# Patient Record
Sex: MALE | Race: OTHER | NOT HISPANIC OR LATINO | URBAN - METROPOLITAN AREA
[De-identification: names, ages, dates, MRNs, and addresses within clinical notes are randomized per-mention and may not be internally consistent; named-entity substitution may affect disease eponyms.]

---

## 2020-04-21 ENCOUNTER — INPATIENT (INPATIENT)
Facility: HOSPITAL | Age: 75
LOS: 19 days | Discharge: HOME HEALTH | DRG: 177 | End: 2020-05-11
Attending: INTERNAL MEDICINE | Admitting: INTERNAL MEDICINE
Payer: MEDICARE

## 2020-04-21 VITALS
WEIGHT: 190.04 LBS | DIASTOLIC BLOOD PRESSURE: 70 MMHG | HEIGHT: 72 IN | RESPIRATION RATE: 18 BRPM | OXYGEN SATURATION: 96 % | HEART RATE: 63 BPM | SYSTOLIC BLOOD PRESSURE: 106 MMHG | TEMPERATURE: 98 F

## 2020-04-21 DIAGNOSIS — R63.8 OTHER SYMPTOMS AND SIGNS CONCERNING FOOD AND FLUID INTAKE: ICD-10-CM

## 2020-04-21 DIAGNOSIS — Z29.9 ENCOUNTER FOR PROPHYLACTIC MEASURES, UNSPECIFIED: ICD-10-CM

## 2020-04-21 DIAGNOSIS — B34.2 CORONAVIRUS INFECTION, UNSPECIFIED: ICD-10-CM

## 2020-04-21 DIAGNOSIS — E11.9 TYPE 2 DIABETES MELLITUS WITHOUT COMPLICATIONS: ICD-10-CM

## 2020-04-21 DIAGNOSIS — I10 ESSENTIAL (PRIMARY) HYPERTENSION: ICD-10-CM

## 2020-04-21 DIAGNOSIS — N17.9 ACUTE KIDNEY FAILURE, UNSPECIFIED: ICD-10-CM

## 2020-04-21 DIAGNOSIS — E03.9 HYPOTHYROIDISM, UNSPECIFIED: ICD-10-CM

## 2020-04-21 DIAGNOSIS — G93.41 METABOLIC ENCEPHALOPATHY: ICD-10-CM

## 2020-04-21 DIAGNOSIS — E87.1 HYPO-OSMOLALITY AND HYPONATREMIA: ICD-10-CM

## 2020-04-21 LAB
ALBUMIN SERPL ELPH-MCNC: 2.7 G/DL — LOW (ref 3.4–5)
ALP SERPL-CCNC: 39 U/L — LOW (ref 40–120)
ALT FLD-CCNC: 32 U/L — SIGNIFICANT CHANGE UP (ref 12–42)
ANION GAP SERPL CALC-SCNC: 11 MMOL/L — SIGNIFICANT CHANGE UP (ref 5–17)
ANION GAP SERPL CALC-SCNC: 6 MMOL/L — LOW (ref 9–16)
ANION GAP SERPL CALC-SCNC: 9 MMOL/L — SIGNIFICANT CHANGE UP (ref 5–17)
APPEARANCE UR: CLEAR — SIGNIFICANT CHANGE UP
APTT BLD: 36.9 SEC — HIGH (ref 27.5–36.3)
AST SERPL-CCNC: 39 U/L — HIGH (ref 15–37)
BASOPHILS # BLD AUTO: 0.01 K/UL — SIGNIFICANT CHANGE UP (ref 0–0.2)
BASOPHILS # BLD AUTO: 0.01 K/UL — SIGNIFICANT CHANGE UP (ref 0–0.2)
BASOPHILS NFR BLD AUTO: 0.2 % — SIGNIFICANT CHANGE UP (ref 0–2)
BASOPHILS NFR BLD AUTO: 0.2 % — SIGNIFICANT CHANGE UP (ref 0–2)
BILIRUB SERPL-MCNC: 0.3 MG/DL — SIGNIFICANT CHANGE UP (ref 0.2–1.2)
BILIRUB UR-MCNC: NEGATIVE — SIGNIFICANT CHANGE UP
BUN SERPL-MCNC: 26 MG/DL — HIGH (ref 7–23)
BUN SERPL-MCNC: 28 MG/DL — HIGH (ref 7–23)
BUN SERPL-MCNC: 39 MG/DL — HIGH (ref 7–23)
CALCIUM SERPL-MCNC: 9.1 MG/DL — SIGNIFICANT CHANGE UP (ref 8.4–10.5)
CALCIUM SERPL-MCNC: 9.1 MG/DL — SIGNIFICANT CHANGE UP (ref 8.4–10.5)
CALCIUM SERPL-MCNC: 9.7 MG/DL — SIGNIFICANT CHANGE UP (ref 8.5–10.5)
CHLORIDE SERPL-SCNC: 100 MMOL/L — SIGNIFICANT CHANGE UP (ref 96–108)
CHLORIDE SERPL-SCNC: 103 MMOL/L — SIGNIFICANT CHANGE UP (ref 96–108)
CHLORIDE SERPL-SCNC: 95 MMOL/L — LOW (ref 96–108)
CO2 SERPL-SCNC: 20 MMOL/L — LOW (ref 22–31)
CO2 SERPL-SCNC: 21 MMOL/L — LOW (ref 22–31)
CO2 SERPL-SCNC: 22 MMOL/L — SIGNIFICANT CHANGE UP (ref 22–31)
COLOR SPEC: YELLOW — SIGNIFICANT CHANGE UP
CREAT ?TM UR-MCNC: 81 MG/DL — SIGNIFICANT CHANGE UP
CREAT SERPL-MCNC: 1.57 MG/DL — HIGH (ref 0.5–1.3)
CREAT SERPL-MCNC: 1.64 MG/DL — HIGH (ref 0.5–1.3)
CREAT SERPL-MCNC: 1.91 MG/DL — HIGH (ref 0.5–1.3)
CRP SERPL-MCNC: 11.02 MG/DL — HIGH (ref 0–0.4)
CRP SERPL-MCNC: >12 MG/DL — HIGH (ref 0–0.9)
D DIMER BLD IA.RAPID-MCNC: 161 NG/ML DDU — SIGNIFICANT CHANGE UP
DIFF PNL FLD: NEGATIVE — SIGNIFICANT CHANGE UP
EOSINOPHIL # BLD AUTO: 0.01 K/UL — SIGNIFICANT CHANGE UP (ref 0–0.5)
EOSINOPHIL # BLD AUTO: 0.01 K/UL — SIGNIFICANT CHANGE UP (ref 0–0.5)
EOSINOPHIL NFR BLD AUTO: 0.2 % — SIGNIFICANT CHANGE UP (ref 0–6)
EOSINOPHIL NFR BLD AUTO: 0.2 % — SIGNIFICANT CHANGE UP (ref 0–6)
FERRITIN SERPL-MCNC: 520 NG/ML — HIGH (ref 30–400)
FLU A RESULT: SIGNIFICANT CHANGE UP
FLU A RESULT: SIGNIFICANT CHANGE UP
FLUAV AG NPH QL: SIGNIFICANT CHANGE UP
FLUBV AG NPH QL: SIGNIFICANT CHANGE UP
GLUCOSE BLDC GLUCOMTR-MCNC: 138 MG/DL — HIGH (ref 70–99)
GLUCOSE BLDC GLUCOMTR-MCNC: 182 MG/DL — HIGH (ref 70–99)
GLUCOSE SERPL-MCNC: 134 MG/DL — HIGH (ref 70–99)
GLUCOSE SERPL-MCNC: 148 MG/DL — HIGH (ref 70–99)
GLUCOSE SERPL-MCNC: 219 MG/DL — HIGH (ref 70–99)
GLUCOSE UR QL: NEGATIVE — SIGNIFICANT CHANGE UP
HCT VFR BLD CALC: 41 % — SIGNIFICANT CHANGE UP (ref 39–50)
HCT VFR BLD CALC: 42.5 % — SIGNIFICANT CHANGE UP (ref 39–50)
HGB BLD-MCNC: 13.9 G/DL — SIGNIFICANT CHANGE UP (ref 13–17)
HGB BLD-MCNC: 14 G/DL — SIGNIFICANT CHANGE UP (ref 13–17)
IMM GRANULOCYTES NFR BLD AUTO: 0.4 % — SIGNIFICANT CHANGE UP (ref 0–1.5)
IMM GRANULOCYTES NFR BLD AUTO: 0.4 % — SIGNIFICANT CHANGE UP (ref 0–1.5)
INR BLD: 1.25 — HIGH (ref 0.88–1.16)
KETONES UR-MCNC: NEGATIVE — SIGNIFICANT CHANGE UP
LACTATE SERPL-SCNC: 1 MMOL/L — SIGNIFICANT CHANGE UP (ref 0.4–2)
LEGIONELLA AG UR QL: NEGATIVE — SIGNIFICANT CHANGE UP
LEUKOCYTE ESTERASE UR-ACNC: NEGATIVE — SIGNIFICANT CHANGE UP
LYMPHOCYTES # BLD AUTO: 0.7 K/UL — LOW (ref 1–3.3)
LYMPHOCYTES # BLD AUTO: 0.72 K/UL — LOW (ref 1–3.3)
LYMPHOCYTES # BLD AUTO: 15 % — SIGNIFICANT CHANGE UP (ref 13–44)
LYMPHOCYTES # BLD AUTO: 15.1 % — SIGNIFICANT CHANGE UP (ref 13–44)
MAGNESIUM SERPL-MCNC: 1.6 MG/DL — SIGNIFICANT CHANGE UP (ref 1.6–2.6)
MCHC RBC-ENTMCNC: 31.1 PG — SIGNIFICANT CHANGE UP (ref 27–34)
MCHC RBC-ENTMCNC: 31.6 PG — SIGNIFICANT CHANGE UP (ref 27–34)
MCHC RBC-ENTMCNC: 32.7 GM/DL — SIGNIFICANT CHANGE UP (ref 32–36)
MCHC RBC-ENTMCNC: 34.1 GM/DL — SIGNIFICANT CHANGE UP (ref 32–36)
MCV RBC AUTO: 92.6 FL — SIGNIFICANT CHANGE UP (ref 80–100)
MCV RBC AUTO: 95.1 FL — SIGNIFICANT CHANGE UP (ref 80–100)
MONOCYTES # BLD AUTO: 0.37 K/UL — SIGNIFICANT CHANGE UP (ref 0–0.9)
MONOCYTES # BLD AUTO: 0.38 K/UL — SIGNIFICANT CHANGE UP (ref 0–0.9)
MONOCYTES NFR BLD AUTO: 7.8 % — SIGNIFICANT CHANGE UP (ref 2–14)
MONOCYTES NFR BLD AUTO: 8.1 % — SIGNIFICANT CHANGE UP (ref 2–14)
NEUTROPHILS # BLD AUTO: 3.55 K/UL — SIGNIFICANT CHANGE UP (ref 1.8–7.4)
NEUTROPHILS # BLD AUTO: 3.64 K/UL — SIGNIFICANT CHANGE UP (ref 1.8–7.4)
NEUTROPHILS NFR BLD AUTO: 76.1 % — SIGNIFICANT CHANGE UP (ref 43–77)
NEUTROPHILS NFR BLD AUTO: 76.3 % — SIGNIFICANT CHANGE UP (ref 43–77)
NITRITE UR-MCNC: NEGATIVE — SIGNIFICANT CHANGE UP
NRBC # BLD: 0 /100 WBCS — SIGNIFICANT CHANGE UP (ref 0–0)
NRBC # BLD: 0 /100 WBCS — SIGNIFICANT CHANGE UP (ref 0–0)
OSMOLALITY SERPL: 285 MOSMOL/KG — SIGNIFICANT CHANGE UP (ref 280–301)
OSMOLALITY UR: 458 MOSMOL/KG — SIGNIFICANT CHANGE UP (ref 100–650)
PH UR: 5.5 — SIGNIFICANT CHANGE UP (ref 5–8)
PLATELET # BLD AUTO: 248 K/UL — SIGNIFICANT CHANGE UP (ref 150–400)
PLATELET # BLD AUTO: 268 K/UL — SIGNIFICANT CHANGE UP (ref 150–400)
POTASSIUM SERPL-MCNC: 4.2 MMOL/L — SIGNIFICANT CHANGE UP (ref 3.5–5.3)
POTASSIUM SERPL-MCNC: 4.7 MMOL/L — SIGNIFICANT CHANGE UP (ref 3.5–5.3)
POTASSIUM SERPL-MCNC: 5.1 MMOL/L — SIGNIFICANT CHANGE UP (ref 3.5–5.3)
POTASSIUM SERPL-SCNC: 4.2 MMOL/L — SIGNIFICANT CHANGE UP (ref 3.5–5.3)
POTASSIUM SERPL-SCNC: 4.7 MMOL/L — SIGNIFICANT CHANGE UP (ref 3.5–5.3)
POTASSIUM SERPL-SCNC: 5.1 MMOL/L — SIGNIFICANT CHANGE UP (ref 3.5–5.3)
PROT SERPL-MCNC: 6.9 G/DL — SIGNIFICANT CHANGE UP (ref 6.4–8.2)
PROT UR-MCNC: NEGATIVE MG/DL — SIGNIFICANT CHANGE UP
PROTHROM AB SERPL-ACNC: 13.9 SEC — HIGH (ref 10–12.9)
RBC # BLD: 4.43 M/UL — SIGNIFICANT CHANGE UP (ref 4.2–5.8)
RBC # BLD: 4.47 M/UL — SIGNIFICANT CHANGE UP (ref 4.2–5.8)
RBC # FLD: 15 % — HIGH (ref 10.3–14.5)
RBC # FLD: 15.1 % — HIGH (ref 10.3–14.5)
RSV RESULT: SIGNIFICANT CHANGE UP
RSV RNA RESP QL NAA+PROBE: SIGNIFICANT CHANGE UP
SARS-COV-2 RNA SPEC QL NAA+PROBE: DETECTED
SODIUM SERPL-SCNC: 122 MMOL/L — LOW (ref 132–145)
SODIUM SERPL-SCNC: 131 MMOL/L — LOW (ref 135–145)
SODIUM SERPL-SCNC: 134 MMOL/L — LOW (ref 135–145)
SODIUM UR-SCNC: 60 MMOL/L — SIGNIFICANT CHANGE UP
SP GR SPEC: 1.01 — SIGNIFICANT CHANGE UP (ref 1–1.03)
TROPONIN I SERPL-MCNC: <0.017 NG/ML — LOW (ref 0.02–0.06)
TSH SERPL-MCNC: 4.48 UIU/ML — SIGNIFICANT CHANGE UP (ref 0.35–4.94)
UROBILINOGEN FLD QL: 0.2 E.U./DL — SIGNIFICANT CHANGE UP
UUN UR-MCNC: 635 MG/DL — SIGNIFICANT CHANGE UP
WBC # BLD: 4.67 K/UL — SIGNIFICANT CHANGE UP (ref 3.8–10.5)
WBC # BLD: 4.77 K/UL — SIGNIFICANT CHANGE UP (ref 3.8–10.5)
WBC # FLD AUTO: 4.67 K/UL — SIGNIFICANT CHANGE UP (ref 3.8–10.5)
WBC # FLD AUTO: 4.77 K/UL — SIGNIFICANT CHANGE UP (ref 3.8–10.5)

## 2020-04-21 PROCEDURE — 99223 1ST HOSP IP/OBS HIGH 75: CPT | Mod: CS

## 2020-04-21 PROCEDURE — 71250 CT THORAX DX C-: CPT | Mod: 26

## 2020-04-21 PROCEDURE — 93010 ELECTROCARDIOGRAM REPORT: CPT

## 2020-04-21 PROCEDURE — 99285 EMERGENCY DEPT VISIT HI MDM: CPT | Mod: CS

## 2020-04-21 PROCEDURE — 99223 1ST HOSP IP/OBS HIGH 75: CPT

## 2020-04-21 PROCEDURE — 70450 CT HEAD/BRAIN W/O DYE: CPT | Mod: 26

## 2020-04-21 RX ORDER — FENOFIBRIC ACID 105 MG/1
1 TABLET ORAL
Qty: 0 | Refills: 0 | DISCHARGE

## 2020-04-21 RX ORDER — AZITHROMYCIN 500 MG/1
250 TABLET, FILM COATED ORAL EVERY 24 HOURS
Refills: 0 | Status: COMPLETED | OUTPATIENT
Start: 2020-04-22 | End: 2020-04-25

## 2020-04-21 RX ORDER — LEVOTHYROXINE SODIUM 125 MCG
137 TABLET ORAL DAILY
Refills: 0 | Status: DISCONTINUED | OUTPATIENT
Start: 2020-04-21 | End: 2020-04-28

## 2020-04-21 RX ORDER — ACETAMINOPHEN 500 MG
650 TABLET ORAL EVERY 6 HOURS
Refills: 0 | Status: DISCONTINUED | OUTPATIENT
Start: 2020-04-21 | End: 2020-05-11

## 2020-04-21 RX ORDER — DEXTROSE 50 % IN WATER 50 %
25 SYRINGE (ML) INTRAVENOUS ONCE
Refills: 0 | Status: DISCONTINUED | OUTPATIENT
Start: 2020-04-21 | End: 2020-05-11

## 2020-04-21 RX ORDER — ROSUVASTATIN CALCIUM 5 MG/1
1 TABLET ORAL
Qty: 0 | Refills: 0 | DISCHARGE

## 2020-04-21 RX ORDER — GLUCAGON INJECTION, SOLUTION 0.5 MG/.1ML
1 INJECTION, SOLUTION SUBCUTANEOUS ONCE
Refills: 0 | Status: DISCONTINUED | OUTPATIENT
Start: 2020-04-21 | End: 2020-05-11

## 2020-04-21 RX ORDER — SODIUM CHLORIDE 9 MG/ML
1000 INJECTION, SOLUTION INTRAVENOUS
Refills: 0 | Status: DISCONTINUED | OUTPATIENT
Start: 2020-04-21 | End: 2020-05-11

## 2020-04-21 RX ORDER — INSULIN LISPRO 100/ML
6 VIAL (ML) SUBCUTANEOUS
Qty: 0 | Refills: 0 | DISCHARGE

## 2020-04-21 RX ORDER — DEXTROSE 50 % IN WATER 50 %
12.5 SYRINGE (ML) INTRAVENOUS ONCE
Refills: 0 | Status: DISCONTINUED | OUTPATIENT
Start: 2020-04-21 | End: 2020-05-11

## 2020-04-21 RX ORDER — CEFTRIAXONE 500 MG/1
1000 INJECTION, POWDER, FOR SOLUTION INTRAMUSCULAR; INTRAVENOUS EVERY 24 HOURS
Refills: 0 | Status: DISCONTINUED | OUTPATIENT
Start: 2020-04-22 | End: 2020-04-22

## 2020-04-21 RX ORDER — AZITHROMYCIN 500 MG/1
500 TABLET, FILM COATED ORAL ONCE
Refills: 0 | Status: COMPLETED | OUTPATIENT
Start: 2020-04-21 | End: 2020-04-21

## 2020-04-21 RX ORDER — INSULIN GLARGINE 100 [IU]/ML
30 INJECTION, SOLUTION SUBCUTANEOUS AT BEDTIME
Refills: 0 | Status: DISCONTINUED | OUTPATIENT
Start: 2020-04-21 | End: 2020-04-23

## 2020-04-21 RX ORDER — CHOLECALCIFEROL (VITAMIN D3) 125 MCG
1 CAPSULE ORAL
Qty: 0 | Refills: 0 | DISCHARGE

## 2020-04-21 RX ORDER — LEVOTHYROXINE SODIUM 125 MCG
1 TABLET ORAL
Qty: 0 | Refills: 0 | DISCHARGE

## 2020-04-21 RX ORDER — DEXTROSE 50 % IN WATER 50 %
15 SYRINGE (ML) INTRAVENOUS ONCE
Refills: 0 | Status: DISCONTINUED | OUTPATIENT
Start: 2020-04-21 | End: 2020-05-11

## 2020-04-21 RX ORDER — ASPIRIN/CALCIUM CARB/MAGNESIUM 324 MG
81 TABLET ORAL DAILY
Refills: 0 | Status: DISCONTINUED | OUTPATIENT
Start: 2020-04-21 | End: 2020-04-27

## 2020-04-21 RX ORDER — INSULIN LISPRO 100/ML
5 VIAL (ML) SUBCUTANEOUS
Refills: 0 | Status: DISCONTINUED | OUTPATIENT
Start: 2020-04-21 | End: 2020-04-23

## 2020-04-21 RX ORDER — HYDROXYCHLOROQUINE SULFATE 200 MG
400 TABLET ORAL EVERY 24 HOURS
Refills: 0 | Status: COMPLETED | OUTPATIENT
Start: 2020-04-22 | End: 2020-04-25

## 2020-04-21 RX ORDER — ENOXAPARIN SODIUM 100 MG/ML
40 INJECTION SUBCUTANEOUS EVERY 24 HOURS
Refills: 0 | Status: DISCONTINUED | OUTPATIENT
Start: 2020-04-21 | End: 2020-04-25

## 2020-04-21 RX ORDER — ATORVASTATIN CALCIUM 80 MG/1
20 TABLET, FILM COATED ORAL AT BEDTIME
Refills: 0 | Status: DISCONTINUED | OUTPATIENT
Start: 2020-04-22 | End: 2020-05-11

## 2020-04-21 RX ORDER — HYDROXYCHLOROQUINE SULFATE 200 MG
800 TABLET ORAL EVERY 24 HOURS
Refills: 0 | Status: COMPLETED | OUTPATIENT
Start: 2020-04-21 | End: 2020-04-21

## 2020-04-21 RX ORDER — CEFTRIAXONE 500 MG/1
1000 INJECTION, POWDER, FOR SOLUTION INTRAMUSCULAR; INTRAVENOUS ONCE
Refills: 0 | Status: COMPLETED | OUTPATIENT
Start: 2020-04-21 | End: 2020-04-21

## 2020-04-21 RX ORDER — SODIUM CHLORIDE 9 MG/ML
1000 INJECTION, SOLUTION INTRAVENOUS
Refills: 0 | Status: DISCONTINUED | OUTPATIENT
Start: 2020-04-21 | End: 2020-04-21

## 2020-04-21 RX ORDER — INSULIN GLARGINE 100 [IU]/ML
28 INJECTION, SOLUTION SUBCUTANEOUS
Qty: 0 | Refills: 0 | DISCHARGE

## 2020-04-21 RX ORDER — HYDROXYCHLOROQUINE SULFATE 200 MG
TABLET ORAL
Refills: 0 | Status: COMPLETED | OUTPATIENT
Start: 2020-04-21 | End: 2020-04-25

## 2020-04-21 RX ORDER — QUETIAPINE FUMARATE 200 MG/1
25 TABLET, FILM COATED ORAL ONCE
Refills: 0 | Status: COMPLETED | OUTPATIENT
Start: 2020-04-21 | End: 2020-04-21

## 2020-04-21 RX ORDER — CHOLECALCIFEROL (VITAMIN D3) 125 MCG
1000 CAPSULE ORAL EVERY 24 HOURS
Refills: 0 | Status: DISCONTINUED | OUTPATIENT
Start: 2020-04-21 | End: 2020-05-11

## 2020-04-21 RX ORDER — ATENOLOL 25 MG/1
25 TABLET ORAL DAILY
Refills: 0 | Status: DISCONTINUED | OUTPATIENT
Start: 2020-04-21 | End: 2020-04-23

## 2020-04-21 RX ORDER — ALLOPURINOL 300 MG
1 TABLET ORAL
Qty: 0 | Refills: 0 | DISCHARGE

## 2020-04-21 RX ORDER — SODIUM CHLORIDE 9 MG/ML
1000 INJECTION, SOLUTION INTRAVENOUS
Refills: 0 | Status: DISCONTINUED | OUTPATIENT
Start: 2020-04-21 | End: 2020-04-22

## 2020-04-21 RX ORDER — SODIUM CHLORIDE 9 MG/ML
2700 INJECTION INTRAMUSCULAR; INTRAVENOUS; SUBCUTANEOUS ONCE
Refills: 0 | Status: COMPLETED | OUTPATIENT
Start: 2020-04-21 | End: 2020-04-21

## 2020-04-21 RX ORDER — SODIUM CHLORIDE 9 MG/ML
1000 INJECTION INTRAMUSCULAR; INTRAVENOUS; SUBCUTANEOUS ONCE
Refills: 0 | Status: COMPLETED | OUTPATIENT
Start: 2020-04-21 | End: 2020-04-21

## 2020-04-21 RX ORDER — ATENOLOL 25 MG/1
1 TABLET ORAL
Qty: 0 | Refills: 0 | DISCHARGE

## 2020-04-21 RX ORDER — UBIDECARENONE 100 MG
1 CAPSULE ORAL
Qty: 0 | Refills: 0 | DISCHARGE

## 2020-04-21 RX ORDER — ASPIRIN/CALCIUM CARB/MAGNESIUM 324 MG
1 TABLET ORAL
Qty: 0 | Refills: 0 | DISCHARGE

## 2020-04-21 RX ORDER — INSULIN LISPRO 100/ML
VIAL (ML) SUBCUTANEOUS
Refills: 0 | Status: DISCONTINUED | OUTPATIENT
Start: 2020-04-21 | End: 2020-04-28

## 2020-04-21 RX ORDER — ALLOPURINOL 300 MG
300 TABLET ORAL DAILY
Refills: 0 | Status: DISCONTINUED | OUTPATIENT
Start: 2020-04-22 | End: 2020-05-11

## 2020-04-21 RX ADMIN — Medication 1000 UNIT(S): at 18:39

## 2020-04-21 RX ADMIN — QUETIAPINE FUMARATE 25 MILLIGRAM(S): 200 TABLET, FILM COATED ORAL at 23:30

## 2020-04-21 RX ADMIN — Medication 800 MILLIGRAM(S): at 23:33

## 2020-04-21 RX ADMIN — SODIUM CHLORIDE 2700 MILLILITER(S): 9 INJECTION INTRAMUSCULAR; INTRAVENOUS; SUBCUTANEOUS at 10:55

## 2020-04-21 RX ADMIN — CEFTRIAXONE 100 MILLIGRAM(S): 500 INJECTION, POWDER, FOR SOLUTION INTRAMUSCULAR; INTRAVENOUS at 10:54

## 2020-04-21 RX ADMIN — SODIUM CHLORIDE 250 MILLILITER(S): 9 INJECTION INTRAMUSCULAR; INTRAVENOUS; SUBCUTANEOUS at 12:14

## 2020-04-21 RX ADMIN — Medication 650 MILLIGRAM(S): at 16:46

## 2020-04-21 RX ADMIN — INSULIN GLARGINE 30 UNIT(S): 100 INJECTION, SOLUTION SUBCUTANEOUS at 23:34

## 2020-04-21 RX ADMIN — AZITHROMYCIN 250 MILLIGRAM(S): 500 TABLET, FILM COATED ORAL at 23:35

## 2020-04-21 RX ADMIN — ENOXAPARIN SODIUM 40 MILLIGRAM(S): 100 INJECTION SUBCUTANEOUS at 16:46

## 2020-04-21 RX ADMIN — CEFTRIAXONE 100 MILLIGRAM(S): 500 INJECTION, POWDER, FOR SOLUTION INTRAMUSCULAR; INTRAVENOUS at 23:51

## 2020-04-21 RX ADMIN — SODIUM CHLORIDE 250 MILLILITER(S): 9 INJECTION, SOLUTION INTRAVENOUS at 17:51

## 2020-04-21 RX ADMIN — SODIUM CHLORIDE 100 MILLILITER(S): 9 INJECTION, SOLUTION INTRAVENOUS at 23:35

## 2020-04-21 RX ADMIN — AZITHROMYCIN 255 MILLIGRAM(S): 500 TABLET, FILM COATED ORAL at 11:13

## 2020-04-21 NOTE — ED PROVIDER NOTE - PROGRESS NOTE DETAILS
Spoke to Patient's Wife who is in the waiting room. Pt currently resting comfortably on the bed. Speaker conference with Patient's Wife and Dr. Jordan (PCP): Communicated to both that Pt is +COVID-19 as indicated by CT chest findings and is also hyponatremic. CT head is unremarkable. Plan for admission to [facility] for further workup.

## 2020-04-21 NOTE — H&P ADULT - PROBLEM SELECTOR PLAN 5
Hx of DM on basal 28U and premeal 6U, also on statin, ARB  - Mod ISS  - Lantus 30 qHS and premeal 5U  -Adjust PRN while requiring D5    #Neuropathy  Hx of neuropathy on duloxetine and gabapentin  -will avoid in setting of metabolic encephalopathy

## 2020-04-21 NOTE — ED PROVIDER NOTE - OBJECTIVE STATEMENT
Triage Chief complaint: altered mental status   Nurse triage notes: Patient to ED - sent by wife for AMS.  States patient was putting hearing aids in wrong ear and confusing his medications.  Patient with recent fever, afebrile at this time and in no distress  Individuals Present- Dr. Vallecillo (ED Attending), Pt.   Vital Signs: HR 63, /70, Resp 18/min, Temp 98.1(F), SpO2 96%      74 y/o M with PHMx of type 2 DM is BIB EMS for AMS. Pt has been having 1 week of intermittent fever that has been controlled with OTC medications. As per his Wife, Pt fell about a week ago from standing height with head strike to the L posterior side. His Wife endorses Pt has been excessively tired where he is "sleeping 20 hours a day". Today, EMS was called who brought him to the ED for evaluation. On interview, Pt feels "not quite right". Pt denies the following symptoms:  headache, dizziness/lightheadedness, neck pain/neck stiffness, numbness/tingling, photophobia, change in vision/hearing/gait/mental status/speech, difficulty swallowing, focal weakness, rash, chills, chest/neck/jaw/arm or back pain, palpitations, shortness of breath, diaphoresis, swelling to arm and/or legs, N/V/D, abdominal pain, abdominal distention, back pain, flank pain, change in urinary or bowel function, dysuria, and hematuria.

## 2020-04-21 NOTE — ED PROVIDER NOTE - CHPI ED SYMPTOMS NEG
no nausea/no dizziness/no vomiting/headache, dizziness/lightheadedness, neck pain/neck stiffness, numbness/tingling, photophobia, change in vision/hearing/gait/mental status/speech, difficulty swallowing, focal weakness, rash, chills, chest/neck/jaw/arm or back

## 2020-04-21 NOTE — ED PROVIDER NOTE - NEUROLOGICAL, MLM
Alert and oriented, no focal deficits, no motor or sensory deficits. Alert and oriented x3 , no focal deficits, no motor or sensory deficits. speech fleunt and appropriate cooperative

## 2020-04-21 NOTE — H&P ADULT - PROBLEM SELECTOR PLAN 3
Noted persistent fevers, chills, no cough, +SOB, COVID+   + lymphopenia, CRP 11.02, Ferritin 520  -Initially some O2 requirements, now 95% on RA

## 2020-04-21 NOTE — ED ADULT TRIAGE NOTE - CHIEF COMPLAINT QUOTE
Patient to ED - sent by wife for AMS.  States patient was putting hearing aids in wrong ear and confusing his medications.  Patient with recent fever, afebrile at this time and in no distress

## 2020-04-21 NOTE — CONSULT NOTE ADULT - ASSESSMENT
76 yo M PMH of DM admitted for covid19 related fever, pneumonia.  Nephrology consulted for Hyponatremia overcorrection.      # Hyponatremia  - Na 122, up to 134 s/p 3L NS at [facility], sOsm 285 s/p hydration  - assuming chronic hypovolemic hypotonic hyponatremia in setting of poor oral intake, diarrhea  - urine Na 60, osm 458, s/p hydration   - would recommend D5W 500 ml IV bolus w maintenance at 125 ml/hr for 12 hr   - repeat sNa, Xavi/osm in 4 hr  - Na goal 128-130 by 11am 4/22  Will follow 74 yo M PMH of DM admitted for covid19 related fever, pneumonia.  Nephrology consulted for Hyponatremia overcorrection.      # Hyponatremia  - Na 122, up to 134 s/p 3L NS at [facility], sOsm 285 s/p hydration  - assuming chronic hypovolemic hypotonic hyponatremia in setting of poor oral intake, diarrhea  - urine Na 60, osm 458, s/p hydration   - would recommend D5W 500 ml IV bolus w maintenance at 125 ml/hr for 12 hr   - repeat sNa, Xavi/osm in 4 hr  - monitor uop  - Na goal 128-130 by 11am 4/22    # LIZ  - unknown baseline Cr, on admission 1.9, down to 1.6 w hydration  - likely pre-renal in setting of poor oral intake, dehydration, diarrhea vs ATN 2/2 covid19 infection  - ulytes, urinalysis noted, FeNa cw pre-renal cause  - keep euvolemic  - maintain map >65-70 mmHg  - monitor BUN/Cr, lytes, uop   - send for renal sono/ bladder scan  - avoid ACE/ARB/NSAIDS/Contrast  - renal dosing of antibiotics/meds    Will follow

## 2020-04-21 NOTE — H&P ADULT - PROBLEM SELECTOR PLAN 1
Noted hyponatremia to 122, on repeat on arrival to [facility] noted to be 134, patient is altered, oriented x1-2 difficulty following commands, 5/5 strength- no focal deficits but noted confusion.  -renal consult  -start d5w and q4h BMP  -f/u legionella- in setting of diarrhea, fevers  -f/u tsh  -holding duloxetine temporarily in setting of hyponatremia

## 2020-04-21 NOTE — CONSULT NOTE ADULT - SUBJECTIVE AND OBJECTIVE BOX
Patient is a 75y old  Male who presents with a chief complaint of fevers.    Nephrology consulted for hyponatremia overcorrection.  Na 122, s/p 3L NS at [facility], Na up to 134.    HPI:  75M PMH of DM presented for 1 week fo fevers. Patient's wife noted increase confusion, fatigue. + Fall- tripped and hit head. Noted progressive worsening over last 3-4 days. Difficulty dressing himself, confused with putting things around house. Poor PO intake, no nausea, no vomiting. + diarrhea for 3 days.    Date of onset of fever: 1 week fevers, Taking temps at home 97-103F.   Date of onset of dyspnea: + SOB (reported work up with cardiologist and pulmonologist in past)  Recent Travel: [place] in February  Sick Contacts: No known sick contacts    At [facility], T 98.7F, HR 55-63, /70- 119/71, RR 18,  96-98% on RA. Labs notable for lymphopenia+, CRP>12, [id number], Na at 122. Patient given 3.7L total of NS, CTX and Azithromycin. CTH neg for acute pathology (2020 16:27)      PAST MEDICAL & SURGICAL HISTORY:  Type 2 diabetes mellitus      Allergies    No Known Allergies    Intolerances        FAMILY HISTORY:  unable to obtain, confused    SOCIAL HISTORY:  unable to obtain, confused     MEDICATIONS  (STANDING):  aspirin enteric coated 81 milliGRAM(s) Oral daily  ATENolol  Tablet 25 milliGRAM(s) Oral daily  cholecalciferol 1000 Unit(s) Oral every 24 hours  dextrose 5%. 1000 milliLiter(s) (50 mL/Hr) IV Continuous <Continuous>  dextrose 5%. 1000 milliLiter(s) (250 mL/Hr) IV Continuous <Continuous>  dextrose 50% Injectable 12.5 Gram(s) IV Push once  dextrose 50% Injectable 25 Gram(s) IV Push once  dextrose 50% Injectable 25 Gram(s) IV Push once  enoxaparin Injectable 40 milliGRAM(s) SubCutaneous every 24 hours  insulin glargine Injectable (LANTUS) 30 Unit(s) SubCutaneous at bedtime  insulin lispro (HumaLOG) corrective regimen sliding scale   SubCutaneous four times a day with meals  insulin lispro Injectable (HumaLOG) 5 Unit(s) SubCutaneous three times a day before meals  levothyroxine 137 MICROGram(s) Oral daily    MEDICATIONS  (PRN):  acetaminophen   Tablet .. 650 milliGRAM(s) Oral every 6 hours PRN Temp greater or equal to 38C (100.4F)  dextrose 40% Gel 15 Gram(s) Oral once PRN Blood Glucose LESS THAN 70 milliGRAM(s)/deciliter  glucagon  Injectable 1 milliGRAM(s) IntraMuscular once PRN Glucose LESS THAN 70 milligrams/deciliter      Vital Signs Last 24 Hrs  T(C): 39.3 (2020 16:26), Max: 39.3 (2020 16:26)  T(F): 102.7 (2020 16:26), Max: 102.7 (2020 16:26)  HR: 68 (2020 16:) (55 - 68)  BP: 146/76 (2020 16:26) (106/70 - 146/76)  BP(mean): --  RR: 19 (2020 16:26) (18 - 19)  SpO2: 95% (2020 16:) (95% - 98%)    REVIEW OF SYSTEMS:  confused    Physical Exam:  General: agitated and confused   HEENT: mmm  Cardiac: normal S1, S2, RRR  GI: Soft, nontender, nondistended  Neuro: confused, agitated  Extremities: No edema       CAPILLARY BLOOD GLUCOSE      POCT Blood Glucose.: 138 mg/dL (2020 17:12)  POCT Blood Glucose.: 127 mg/dL (2020 10:10)      I&O's Summary        LABS:                            13.9   4.67  )-----------( 268      ( 2020 16:03 )             42.5       PT/INR - ( 2020 11:15 )   PT: 13.9 sec;   INR: 1.25          PTT - ( 2020 11:15 )  PTT:36.9 sec  CARDIAC MARKERS ( 2020 11:15 )  <0.017 ng/mL / x     / x     / x     / x          Urinalysis Basic - ( 2020 12:55 )    Color: Yellow / Appearance: Clear / S.015 / pH: x  Gluc: x / Ketone: NEGATIVE  / Bili: NEGATIVE / Urobili: 0.2 E.U./dL   Blood: x / Protein: NEGATIVE mg/dL / Nitrite: NEGATIVE   Leuk Esterase: NEGATIVE / RBC: x / WBC x   Sq Epi: x / Non Sq Epi: x / Bacteria: x        RADIOLOGY & ADDITIONAL TESTS:    < from: CT Chest No Cont (20 @ 11:09) >    EXAM:  CT CHEST                           PROCEDURE DATE:  2020          INTERPRETATION:  CT of the CHEST without intravenous contrast dated 2020 11:09 AM    INDICATION: fever    TECHNIQUE: CT of the chest was performed without intravenous contrast.  Intravenous contrast was not used Axial and coronal and sagittal images were produced and reviewed.    PRIOR STUDIES: None.    FINDINGS: The heart is normal in size.  No pericardial effusion is seen.  Evaluation of the vasculature is limited without intravenous contrast, but the great vessels are grossly unremarkable.  Evaluation for adenopathy is limited without intravenous contrast. Within that limitation, no mediastinal or hilar or axillary lymphadenopathy is seen. No thyroid tissue identified-correlate with serum TSH level.. The main pulmonary artery is normal measuring 3.1 cm diameter. Coronary artery calcifications.    Trace pleural fluid at the right posterior costophrenic sulcus.. Evaluation of the pulmonary parenchyma demonstrates groundglass opacities and patchy consolidation in the right upper lobe predominantly posterior segment. Peripheral groundglass opacity seen in the lateral basal segment right lower lobe-the latter has a reverse halo=atoll  appearance.. 1.0 cm groundglass opacity seen in the posterior aspect of the apicoposterior segment left upper lobe abutting the major fissure. 2.1 cm peribroncho vascular groundglass opacity seen in the lingula. 1.1 cm subpleural groundglass opacity seen in the left lower lobe. 1.1 cm subtle peribroncho vascular groundglass opacity in the lingula.    Limited evaluation of the upper abdomen demonstrates a 3.1 cm cyst in segment four of liver. Spleen at upper limits of normal in size. Fatty replacement seen of pancreatic head.    Evaluation of the osseous structures demonstrates degenerative changes.      IMPRESSION: Bilateral lung findings as described above in detail. Covid  19 pneumonia is a possibility. Correlate with serologic testing. Differential diagnosis includes organizing pneumonia, fungal infection, granulomatosis with polyangiitis , bacterial pneumonia.    < end of copied text >    < from: CT Head No Cont (20 @ 11:09) >    EXAM:  CT BRAIN                           PROCEDURE DATE:  2020          INTERPRETATION:  Altered level of consciousness.    Technique: CT head was performed utilizing axial images from the base of the skull through the vertex without the administration of intravenous contrast. Images were reviewed in the bone, brain and subdural windows.    Findings: There are no prior studies available for comparison.    There is no evidence of acute intracranial hemorrhage or acute transcortical infarct. There is enlargement the sulci, cisterns and ventricles consistent with mild cerebral and cerebellar volume loss. There is no evidence of mass-effect or midline shift. There is no evidence of an intra or extra-axial fluid collection.    Visualized paranasal sinuses and bilateral mastoid air cells are clear.    Impression: No acute intracranial injury.    < end of copied text >

## 2020-04-21 NOTE — H&P ADULT - PROBLEM SELECTOR PLAN 2
Noted progressively worsening confusion, difficulty performing activities of daily living over last week. Concern may be associated with hyponatremia, also noted infection- with covid with high fevers  -Avoid sedating medications, gabapentin held  - Hyponatremia, f/u renal recs management as above  - Tx covid infection, Tylenol PRN for fevers, f/u blood cultures for any concurrent infection  -CTH neg for acute pathology

## 2020-04-21 NOTE — H&P ADULT - ASSESSMENT
75M PMH of DM presented for 1 week fo fevers. Patient's wife reported fall 1 week ago hitting head and noted increased fatigue/sleepiness.

## 2020-04-21 NOTE — ED PROVIDER NOTE - CARE PLAN
Principal Discharge DX:	Coronavirus infection  Secondary Diagnosis:	Pneumonia  Secondary Diagnosis:	Hyponatremia

## 2020-04-21 NOTE — ED ADULT NURSE NOTE - NSIMPLEMENTINTERV_GEN_ALL_ED
Implemented All Universal Safety Interventions:  [place] to call system. Call bell, personal items and telephone within reach. Instruct patient to call for assistance. Room bathroom lighting operational. Non-slip footwear when patient is off stretcher. Physically safe environment: no spills, clutter or unnecessary equipment. Stretcher in lowest position, wheels locked, appropriate side rails in place.

## 2020-04-21 NOTE — CHART NOTE - NSCHARTNOTEFT_GEN_A_CORE
75M poor historian, [facility] DM has been having fevers at home and lethargic, confused, found Na 122 in ED. Received 3L NS at [facility] now 134. Patient is a 75y old  Male who presents with a chief complaint of     HPI:  75M PMH of DM presented for 1 week fo fevers. Patient's wife noted increase confusion, fatigue. + Fall- tripped and hit head. Noted progressive worsening over last 3-4 days. Difficulty dressing himself, confused with putting things around house. Poor PO intake, no nausea, no vomiting. + diarrhea (3-4/day) for 3 days    Date of onset of fever: 1 week fevers, Taking temps at home 97-103F.   Date of onset of dyspnea: + SOB (reported work up with cardiologist and pulmonologist in past)  Recent Travel: [place] in February  Sick Contacts: No known sick contacts    At [facility], T 98.7F, HR 55-63, /70- 119/71, RR 18,  96-98% on RA. Labs notable for lymphopenia+, CRP>12, [id number], Na at 122. Patient given 3.7L total of NS, CTX and Azithromycin. CTH neg for acute pathology (2020 16:27)      Allergies    No Known Allergies    Intolerances      Home Medications:  allopurinol 300 mg oral tablet: 1 tab(s) orally once a day (2020 19:10)  aspirin 81 mg oral tablet: 1 tab(s) orally once a day (2020 19:10)  atenolol 25 mg oral tablet: 1 tab(s) orally once a day (2020 19:10)  Co Q-10 100 mg oral capsule: 1 cap(s) orally once a day (2020 19:10)  DULoxetine 20 mg oral delayed release capsule: 1 cap(s) orally once a day (2020 19:10)  fenofibric acid 135 mg oral delayed release capsule: 1 cap(s) orally once a day (2020 19:10)  gabapentin 300 mg oral tablet: 1 tab(s) orally once a day (at bedtime) (2020 19:10)  HumaLOG 100 units/mL subcutaneous solution: 6 unit(s) subcutaneous 3 times a day (before meals) (2020 19:10)  levothyroxine 137 mcg (0.137 mg) oral tablet: 1 tab(s) orally once a day (2020 19:10)  losartan 25 mg oral tablet: 0.5 tab(s) orally once a day (2020 19:10)  metFORMIN 500 mg oral tablet, extended release: 1 tab(s) orally once a day (2020 19:10)  rosuvastatin 5 mg oral tablet: 1 tab(s) orally once a day (2020 19:10)  Toujeo SoloStar 300 units/mL subcutaneous solution: 28 unit(s) subcutaneous once a day (at bedtime) (2020 19:10)  Vitamin D3 2000 intl units (50 mcg) oral tablet: 1 tab(s) orally once a day (2020 19:10)      SOCIAL HX:     Smoking          ETOH/Illicit drugs          Occupation    PAST MEDICAL & SURGICAL HISTORY:  Type 2 diabetes mellitus      FAMILY HISTORY:  :    No known cardiovascular or pulmonary family history     ROS:  See HPI     PHYSICAL EXAM    ICU Vital Signs Last 24 Hrs  T(C): 36.3 (2020 19:55), Max: 39.3 (2020 16:26)  T(F): 97.3 (2020 19:55), Max: 102.7 (2020 16:26)  HR: 68 (2020 16:26) (55 - 68)  BP: 146/76 (2020 16:26) (106/70 - 146/76)  BP(mean): --  ABP: --  ABP(mean): --  RR: 19 (2020 16:26) (18 - 19)  SpO2: 95% (2020 16:26) (95% - 98%)      General: Not in distress  HEENT:  TAMIKO              Lymphatic system: No LN  Lungs: Bilateral BS  Cardiovascular: Regular  Gastrointestinal: Soft, Positive BS  Musculoskeletal: No clubbing.  Moves all extremities.    Skin: Warm.  Intact  Neurological: No motor or sensory deficit         LABS:                          13.9   4.67  )-----------( 268      ( 2020 16:03 )             42.5                                               04-21    131<L>  |  100  |  26<H>  ----------------------------<  219<H>  5.1   |  20<L>  |  1.57<H>    Ca    9.1      2020 20:49  Mg     1.6     04-21    TPro  6.9  /  Alb  2.7<L>  /  TBili  0.3  /  DBili  x   /  AST  39<H>  /  ALT  32  /  AlkPhos  39<L>  04-21      PT/INR - ( 2020 11:15 )   PT: 13.9 sec;   INR: 1.25          PTT - ( 2020 11:15 )  PTT:36.9 sec                                       Urinalysis Basic - ( 2020 12:55 )    Color: Yellow / Appearance: Clear / S.015 / pH: x  Gluc: x / Ketone: NEGATIVE  / Bili: NEGATIVE / Urobili: 0.2 E.U./dL   Blood: x / Protein: NEGATIVE mg/dL / Nitrite: NEGATIVE   Leuk Esterase: NEGATIVE / RBC: x / WBC x   Sq Epi: x / Non Sq Epi: x / Bacteria: x        CARDIAC MARKERS ( 2020 11:15 )  <0.017 ng/mL / x     / x     / x     / x                                                LIVER FUNCTIONS - ( 2020 11:15 )  Alb: 2.7 g/dL / Pro: 6.9 g/dL / ALK PHOS: 39 U/L / ALT: 32 U/L / AST: 39 U/L / GGT: x                                                                                                                                           CXR:    ECHO:    MEDICATIONS  (STANDING):  aspirin enteric coated 81 milliGRAM(s) Oral daily  ATENolol  Tablet 25 milliGRAM(s) Oral daily  cholecalciferol 1000 Unit(s) Oral every 24 hours  dextrose 5%. 1000 milliLiter(s) (50 mL/Hr) IV Continuous <Continuous>  dextrose 5%. 1000 milliLiter(s) (100 mL/Hr) IV Continuous <Continuous>  dextrose 50% Injectable 12.5 Gram(s) IV Push once  dextrose 50% Injectable 25 Gram(s) IV Push once  dextrose 50% Injectable 25 Gram(s) IV Push once  enoxaparin Injectable 40 milliGRAM(s) SubCutaneous every 24 hours  hydroxychloroquine   Oral   hydroxychloroquine 800 milliGRAM(s) Oral every 24 hours  insulin glargine Injectable (LANTUS) 30 Unit(s) SubCutaneous at bedtime  insulin lispro (HumaLOG) corrective regimen sliding scale   SubCutaneous four times a day with meals  insulin lispro Injectable (HumaLOG) 5 Unit(s) SubCutaneous three times a day before meals  levothyroxine 137 MICROGram(s) Oral daily    MEDICATIONS  (PRN):  acetaminophen   Tablet .. 650 milliGRAM(s) Oral every 6 hours PRN Temp greater or equal to 38C (100.4F)  dextrose 40% Gel 15 Gram(s) Oral once PRN Blood Glucose LESS THAN 70 milliGRAM(s)/deciliter  glucagon  Injectable 1 milliGRAM(s) IntraMuscular once PRN Glucose LESS THAN 70 milligrams/deciliter Patient is a 75y old  Male who presents with a chief complaint of     HPI:  75M PMH of DM presented for 1 week fo fevers. Patient's wife noted increase confusion, fatigue. + Fall- tripped and hit head. Noted progressive worsening over last 3-4 days. Difficulty dressing himself, confused with putting things around house. Poor PO intake, no nausea, no vomiting. + diarrhea (3-4/day) for 3 days    Date of onset of fever: 1 week fevers, Taking temps at home 97-103F.   Date of onset of dyspnea: + SOB (reported work up with cardiologist and pulmonologist in past)  Recent Travel: [place] in February  Sick Contacts: No known sick contacts    At [facility], T 98.7F, HR 55-63, /70- 119/71, RR 18,  96-98% on RA. Labs notable for lymphopenia+, CRP>12, [id number], Na at 122. Patient given 3.7L total of NS, CTX and Azithromycin. CTH neg for acute pathology (2020 16:27)      ICU now consulted for higher level of care/neurochecks given rapid correction of hyponatremia.     SUBJ: Pt c/o thirst. Denies HA, CP/pressure, cough, n/d/v/c. Poor historian, acutely altered appearing. AOx3, able to name objects but speech is slowed, higher cognition slowed  (for example: not aware he had roommate in room). Per wife: usually extremely sharp, takes care of self, has had progressive decline of one week with a fall, today was not able to recognize his own meds.     Gen: WNWD man in bed, appears lethargic/altered   HHEENT: dry  mucosa   Resp: CTA, no rhonchi/wheeze   Cardio: RRR   Neuro: CN grossly intact, strength 5/5 in all extremities  Ext: pulses 2+, WWP, no edema     Home Medications:  allopurinol 300 mg oral tablet: 1 tab(s) orally once a day (2020 19:10)  aspirin 81 mg oral tablet: 1 tab(s) orally once a day (2020 19:10)  atenolol 25 mg oral tablet: 1 tab(s) orally once a day (2020 19:10)  Co Q-10 100 mg oral capsule: 1 cap(s) orally once a day (2020 19:10)  DULoxetine 20 mg oral delayed release capsule: 1 cap(s) orally once a day (2020 19:10)  fenofibric acid 135 mg oral delayed release capsule: 1 cap(s) orally once a day (2020 19:10)  gabapentin 300 mg oral tablet: 1 tab(s) orally once a day (at bedtime) (2020 19:10)  HumaLOG 100 units/mL subcutaneous solution: 6 unit(s) subcutaneous 3 times a day (before meals) (2020 19:10)  levothyroxine 137 mcg (0.137 mg) oral tablet: 1 tab(s) orally once a day (2020 19:10)  losartan 25 mg oral tablet: 0.5 tab(s) orally once a day (2020 19:10)  metFORMIN 500 mg oral tablet, extended release: 1 tab(s) orally once a day (2020 19:10)  rosuvastatin 5 mg oral tablet: 1 tab(s) orally once a day (2020 19:10)  Toujeo SoloStar 300 units/mL subcutaneous solution: 28 unit(s) subcutaneous once a day (at bedtime) (2020 19:10)  Vitamin D3 2000 intl units (50 mcg) oral tablet: 1 tab(s) orally once a day (2020 19:10)      SOCIAL HX:     Smoking          ETOH/Illicit drugs          Occupation    PAST MEDICAL & SURGICAL HISTORY:  Type 2 diabetes mellitus      FAMILY HISTORY:  :    No known cardiovascular or pulmonary family history     ROS:  See HPI     PHYSICAL EXAM    ICU Vital Signs Last 24 Hrs  T(C): 36.3 (2020 19:55), Max: 39.3 (2020 16:26)  T(F): 97.3 (2020 19:55), Max: 102.7 (2020 16:26)  HR: 68 (2020 16:26) (55 - 68)  BP: 146/76 (2020 16:26) (106/70 - 146/76)  BP(mean): --  ABP: --  ABP(mean): --  RR: 19 (2020 16:26) (18 - 19)  SpO2: 95% (2020 16:26) (95% - 98%)      General: Not in distress  HEENT:  TAMIKO              Lymphatic system: No LN  Lungs: Bilateral BS  Cardiovascular: Regular  Gastrointestinal: Soft, Positive BS  Musculoskeletal: No clubbing.  Moves all extremities.    Skin: Warm.  Intact  Neurological: No motor or sensory deficit         LABS:                          13.9   4.67  )-----------( 268      ( 2020 16:03 )             42.5                                               04-    131<L>  |  100  |  26<H>  ----------------------------<  219<H>  5.1   |  20<L>  |  1.57<H>    Ca    9.1      2020 20:49  Mg     1.6     -    TPro  6.9  /  Alb  2.7<L>  /  TBili  0.3  /  DBili  x   /  AST  39<H>  /  ALT  32  /  AlkPhos  39<L>        PT/INR - ( 2020 11:15 )   PT: 13.9 sec;   INR: 1.25          PTT - ( 2020 11:15 )  PTT:36.9 sec                                       Urinalysis Basic - ( 2020 12:55 )    Color: Yellow / Appearance: Clear / S.015 / pH: x  Gluc: x / Ketone: NEGATIVE  / Bili: NEGATIVE / Urobili: 0.2 E.U./dL   Blood: x / Protein: NEGATIVE mg/dL / Nitrite: NEGATIVE   Leuk Esterase: NEGATIVE / RBC: x / WBC x   Sq Epi: x / Non Sq Epi: x / Bacteria: x        CARDIAC MARKERS ( 2020 11:15 )  <0.017 ng/mL / x     / x     / x     / x                                                LIVER FUNCTIONS - ( 2020 11:15 )  Alb: 2.7 g/dL / Pro: 6.9 g/dL / ALK PHOS: 39 U/L / ALT: 32 U/L / AST: 39 U/L / GGT: x                                                                                                                                           CXR:    ECHO:    MEDICATIONS  (STANDING):  aspirin enteric coated 81 milliGRAM(s) Oral daily  ATENolol  Tablet 25 milliGRAM(s) Oral daily  cholecalciferol 1000 Unit(s) Oral every 24 hours  dextrose 5%. 1000 milliLiter(s) (50 mL/Hr) IV Continuous <Continuous>  dextrose 5%. 1000 milliLiter(s) (100 mL/Hr) IV Continuous <Continuous>  dextrose 50% Injectable 12.5 Gram(s) IV Push once  dextrose 50% Injectable 25 Gram(s) IV Push once  dextrose 50% Injectable 25 Gram(s) IV Push once  enoxaparin Injectable 40 milliGRAM(s) SubCutaneous every 24 hours  hydroxychloroquine   Oral   hydroxychloroquine 800 milliGRAM(s) Oral every 24 hours  insulin glargine Injectable (LANTUS) 30 Unit(s) SubCutaneous at bedtime  insulin lispro (HumaLOG) corrective regimen sliding scale   SubCutaneous four times a day with meals  insulin lispro Injectable (HumaLOG) 5 Unit(s) SubCutaneous three times a day before meals  levothyroxine 137 MICROGram(s) Oral daily    MEDICATIONS  (PRN):  acetaminophen   Tablet .. 650 milliGRAM(s) Oral every 6 hours PRN Temp greater or equal to 38C (100.4F)  dextrose 40% Gel 15 Gram(s) Oral once PRN Blood Glucose LESS THAN 70 milliGRAM(s)/deciliter  glucagon  Injectable 1 milliGRAM(s) IntraMuscular once PRN Glucose LESS THAN 70 milligrams/deciliter    < from: CT Chest No Cont (20 @ 11:09) >    IMPRESSION: Bilateral lung findings as described above in detail. Covid  19 pneumonia is a possibility. Correlate with serologic testing. Differential diagnosis includes organizing pneumonia, fungal infection, granulomatosis with polyangiitis , bacterial pneumonia.      < end of copied text >    < from: CT Head No Cont (20 @ 11:09) >    Impression: No acute intracranial injury.    < end of copied text >        75M PMH of DM presented for 1 week fo fevers and metabolic encephalopathy with falls. COVID+ and found Na 122 which rapidly corrected to 134 after 3L NS. ICU consulted for AMS (unclear 2/2 COVID infection and high fevers or symptomatic hyponatremia) and higher level of care/observation     #Metabolic Encephalopathy   -Agree with slow correction with d5 maintenance at low rate and frequent BMPs  -Agree with azithro/plaquenil and COVID treatment plan for primary team   -Fu renal recs  -Would avoid benzos if Pt becomes more acutely agitated   -Stable for RMF level monitoring   -Please reconsult with questions

## 2020-04-21 NOTE — ED PROVIDER NOTE - ENMT, MLM
Airway patent. Airway patent. Hearing aid to R ear. Airway patent, Nasal mucosa clear. Mouth with normal mucosa. Throat has no vesicles, no oropharyngeal exudates and uvula is midline.

## 2020-04-21 NOTE — H&P ADULT - HISTORY OF PRESENT ILLNESS
75M PMH of DM presented for 1 week fo fevers. Patient's wife reported fall 1 week ago hitting head and noted increased fatigue/sleepiness.     Date of onset of fever: 1 week fevers  Date of onset of dyspnea:  Recent Travel:  Sick Contacts:  COVID Exposure:  Close Contacts:    ROS:  Fevers: Y/N  Malaise: Y/N  Myalgias: Y/N  SOB: Y/N          At rest: Y/N         With exertion: Y/N         At baseline: Y/ N  Cough: Y/N         Productive: Y/N  Nausea: Y/N  Vomiting: Y/N  Diarrhea: Y/N    PMHx:   HTN: Y/N  DM: Y/N  Lung Disease: Y/N       Specify:  Cardiovascular disease: Y/N  Malignancy: Y/N  Immunosuppression: Y/N  HIV: Y/N  CKD/ESRD: Y/N  Chronic Liver Disease: Y/N 75M PMH of DM presented for 1 week fo fevers. Patient's wife noted increase confusion, fatigue. + Fall- tripped and hit head. Noted progressive worsening over last 3-4 days. Difficulty dressing himself, confused with putting things around house. Poor PO intake, no nausea, no vomiting. + diarrhea (3-4/day) for 3 days    Date of onset of fever: 1 week fevers, Taking temps at home 97-103F.   Date of onset of dyspnea: + SOB (reported work up with cardiologist and pulmonologist in past)  Recent Travel: [place] in February  Sick Contacts: No known sick contacts 75M PMH of DM presented for 1 week fo fevers. Patient's wife noted increase confusion, fatigue. + Fall- tripped and hit head. Noted progressive worsening over last 3-4 days. Difficulty dressing himself, confused with putting things around house. Poor PO intake, no nausea, no vomiting. + diarrhea (3-4/day) for 3 days    Date of onset of fever: 1 week fevers, Taking temps at home 97-103F.   Date of onset of dyspnea: + SOB (reported work up with cardiologist and pulmonologist in past)  Recent Travel: [place] in February  Sick Contacts: No known sick contacts    At [facility], T 98.7F, HR 55-63, /70- 119/71, RR 18,  96-98% on RA. Labs notable for lymphopenia+, CRP>12, [id number], Na at 122. Patient given 3.7L total of NS, CTX and Azithromycin. CTH neg for acute pathology

## 2020-04-21 NOTE — ED PROVIDER NOTE - CLINICAL SUMMARY MEDICAL DECISION MAKING FREE TEXT BOX
74 y/o M is BIB EMS for AMS with associated fevers and fatigue. On exam, Pt is well and in no apparent distress. Neurological exam is unremarkable. Plan for EKG, CT scan of the head and chest, as well as broad spectrum labs including screening for COVID-19. Will order Zithromax and Ceftriaxone for symptomatic relief. Will reassess afterwards.

## 2020-04-21 NOTE — H&P ADULT - ATTENDING COMMENTS
76yo M w/hx of DM with continuous glucose monitor presenting to [facility] for "AMS" per family. Per ED signout only mildly confused but following commands, and A&Ox3, COVID positive with Na of 122 assumed prerenal in the setting of sepsis 2/2 COVID. Patient transferred to [facility] after 3L NS given in ED and found to have overcorrected Na to 134. Patient oriented to self, not place or time. Denies pain, though grimaces intermittently throughout examination. Following commands with non-focal examination, but confusion appears to be worsening. Concern due to TME 2/2 sepsis/COVID vs symptomatic hyponatremia with overcorrection. Roughly 2L free water deficit for target Na. Nephrology consulted insisting on 500cc D5W bolus for rapid correction. Given rapid fluctuations and poor mental status, will also consult ICU for evaluation. Patient not currently hypoxic, febrile, or tachypneic. Plan for COVID therapy pending outcome of sodium correction. 74yo M w/hx of DM with continuous glucose monitor presenting to [facility] for "AMS" per family. Per ED signout only mildly confused but following commands, and A&Ox3, COVID positive with Na of 122 assumed prerenal in the setting of sepsis 2/2 COVID. Patient transferred to [facility] after 3L NS given in ED and found to have overcorrected Na to 134. Patient oriented to self, not place or time. Denies pain, though grimaces intermittently throughout examination. Following commands with non-focal examination, but confusion appears to be worsening. Concern due to TME 2/2 sepsis/COVID vs symptomatic hyponatremia with overcorrection. Roughly 2L free water deficit for target Na. Nephrology consulted insisting on 500cc D5W bolus for rapid correction. Urine studies pending. Given rapid fluctuations and poor mental status, will also consult ICU for evaluation. QTc pending for choice of COVID therapy given fever. Not currently tachypneic or hypoxic. 76yo M w/hx of DM with continuous glucose monitor presenting to [place] for "AMS" per family. Per ED signout only mildly confused but following commands, and A&Ox3, COVID positive with Na of 122 assumed prerenal in the setting of sepsis 2/2 COVID. Patient transferred to [facility] after 3L NS given in ED and found to have overcorrected Na to 134. Patient oriented to self, not place or time. Denies pain, though grimaces intermittently throughout examination. Following commands with non-focal examination, but confusion appears to be worsening. Concern due to TME 2/2 sepsis/COVID vs symptomatic hyponatremia with overcorrection. Roughly 2L free water deficit for target Na. Nephrology consulted insisting on 500cc D5W bolus for rapid correction. Urine studies pending. Given rapid fluctuations and poor mental status, will also consult ICU for evaluation. QTc pending for choice of COVID therapy given fever. Not currently tachypneic or hypoxic. Will continue CAP coverage given focal appearance of consolidation on CT.

## 2020-04-22 LAB
A1C WITH ESTIMATED AVERAGE GLUCOSE RESULT: 6.6 % — HIGH (ref 4–5.6)
ALBUMIN SERPL ELPH-MCNC: 2.9 G/DL — LOW (ref 3.3–5)
ALP SERPL-CCNC: 29 U/L — LOW (ref 40–120)
ALT FLD-CCNC: 28 U/L — SIGNIFICANT CHANGE UP (ref 10–45)
ANION GAP SERPL CALC-SCNC: 12 MMOL/L — SIGNIFICANT CHANGE UP (ref 5–17)
ANION GAP SERPL CALC-SCNC: 12 MMOL/L — SIGNIFICANT CHANGE UP (ref 5–17)
AST SERPL-CCNC: 42 U/L — HIGH (ref 10–40)
BASOPHILS # BLD AUTO: 0 K/UL — SIGNIFICANT CHANGE UP (ref 0–0.2)
BASOPHILS NFR BLD AUTO: 0 % — SIGNIFICANT CHANGE UP (ref 0–2)
BILIRUB SERPL-MCNC: 0.2 MG/DL — SIGNIFICANT CHANGE UP (ref 0.2–1.2)
BUN SERPL-MCNC: 19 MG/DL — SIGNIFICANT CHANGE UP (ref 7–23)
BUN SERPL-MCNC: 24 MG/DL — HIGH (ref 7–23)
CALCIUM SERPL-MCNC: 8.7 MG/DL — SIGNIFICANT CHANGE UP (ref 8.4–10.5)
CALCIUM SERPL-MCNC: 9 MG/DL — SIGNIFICANT CHANGE UP (ref 8.4–10.5)
CHLORIDE SERPL-SCNC: 102 MMOL/L — SIGNIFICANT CHANGE UP (ref 96–108)
CHLORIDE SERPL-SCNC: 98 MMOL/L — SIGNIFICANT CHANGE UP (ref 96–108)
CO2 SERPL-SCNC: 20 MMOL/L — LOW (ref 22–31)
CO2 SERPL-SCNC: 22 MMOL/L — SIGNIFICANT CHANGE UP (ref 22–31)
CREAT SERPL-MCNC: 1.61 MG/DL — HIGH (ref 0.5–1.3)
CREAT SERPL-MCNC: 1.62 MG/DL — HIGH (ref 0.5–1.3)
CRP SERPL-MCNC: 11.43 MG/DL — HIGH (ref 0–0.4)
EOSINOPHIL # BLD AUTO: 0 K/UL — SIGNIFICANT CHANGE UP (ref 0–0.5)
EOSINOPHIL NFR BLD AUTO: 0 % — SIGNIFICANT CHANGE UP (ref 0–6)
ESTIMATED AVERAGE GLUCOSE: 143 MG/DL — HIGH (ref 68–114)
FERRITIN SERPL-MCNC: 553 NG/ML — HIGH (ref 30–400)
GLUCOSE BLDC GLUCOMTR-MCNC: 153 MG/DL — HIGH (ref 70–99)
GLUCOSE BLDC GLUCOMTR-MCNC: 155 MG/DL — HIGH (ref 70–99)
GLUCOSE BLDC GLUCOMTR-MCNC: 95 MG/DL — SIGNIFICANT CHANGE UP (ref 70–99)
GLUCOSE SERPL-MCNC: 162 MG/DL — HIGH (ref 70–99)
GLUCOSE SERPL-MCNC: 220 MG/DL — HIGH (ref 70–99)
HCT VFR BLD CALC: 40.1 % — SIGNIFICANT CHANGE UP (ref 39–50)
HGB BLD-MCNC: 13 G/DL — SIGNIFICANT CHANGE UP (ref 13–17)
IMM GRANULOCYTES NFR BLD AUTO: 0.4 % — SIGNIFICANT CHANGE UP (ref 0–1.5)
LYMPHOCYTES # BLD AUTO: 0.7 K/UL — LOW (ref 1–3.3)
LYMPHOCYTES # BLD AUTO: 15.4 % — SIGNIFICANT CHANGE UP (ref 13–44)
MAGNESIUM SERPL-MCNC: 1.6 MG/DL — SIGNIFICANT CHANGE UP (ref 1.6–2.6)
MCHC RBC-ENTMCNC: 30.7 PG — SIGNIFICANT CHANGE UP (ref 27–34)
MCHC RBC-ENTMCNC: 32.4 GM/DL — SIGNIFICANT CHANGE UP (ref 32–36)
MCV RBC AUTO: 94.6 FL — SIGNIFICANT CHANGE UP (ref 80–100)
MONOCYTES # BLD AUTO: 0.42 K/UL — SIGNIFICANT CHANGE UP (ref 0–0.9)
MONOCYTES NFR BLD AUTO: 9.2 % — SIGNIFICANT CHANGE UP (ref 2–14)
NEUTROPHILS # BLD AUTO: 3.41 K/UL — SIGNIFICANT CHANGE UP (ref 1.8–7.4)
NEUTROPHILS NFR BLD AUTO: 75 % — SIGNIFICANT CHANGE UP (ref 43–77)
NRBC # BLD: 0 /100 WBCS — SIGNIFICANT CHANGE UP (ref 0–0)
OSMOLALITY SERPL: 279 MOSMOL/KG — LOW (ref 280–301)
OSMOLALITY UR: 445 MOSMOL/KG — SIGNIFICANT CHANGE UP (ref 100–650)
PLATELET # BLD AUTO: 261 K/UL — SIGNIFICANT CHANGE UP (ref 150–400)
POTASSIUM SERPL-MCNC: 4 MMOL/L — SIGNIFICANT CHANGE UP (ref 3.5–5.3)
POTASSIUM SERPL-MCNC: 4.6 MMOL/L — SIGNIFICANT CHANGE UP (ref 3.5–5.3)
POTASSIUM SERPL-SCNC: 4 MMOL/L — SIGNIFICANT CHANGE UP (ref 3.5–5.3)
POTASSIUM SERPL-SCNC: 4.6 MMOL/L — SIGNIFICANT CHANGE UP (ref 3.5–5.3)
PROT SERPL-MCNC: 6.1 G/DL — SIGNIFICANT CHANGE UP (ref 6–8.3)
RBC # BLD: 4.24 M/UL — SIGNIFICANT CHANGE UP (ref 4.2–5.8)
RBC # FLD: 14.8 % — HIGH (ref 10.3–14.5)
SODIUM SERPL-SCNC: 130 MMOL/L — LOW (ref 135–145)
SODIUM SERPL-SCNC: 136 MMOL/L — SIGNIFICANT CHANGE UP (ref 135–145)
SODIUM UR-SCNC: 67 MMOL/L — SIGNIFICANT CHANGE UP
WBC # BLD: 4.55 K/UL — SIGNIFICANT CHANGE UP (ref 3.8–10.5)
WBC # FLD AUTO: 4.55 K/UL — SIGNIFICANT CHANGE UP (ref 3.8–10.5)

## 2020-04-22 PROCEDURE — 71045 X-RAY EXAM CHEST 1 VIEW: CPT | Mod: 26

## 2020-04-22 PROCEDURE — 99233 SBSQ HOSP IP/OBS HIGH 50: CPT

## 2020-04-22 PROCEDURE — 99231 SBSQ HOSP IP/OBS SF/LOW 25: CPT

## 2020-04-22 RX ORDER — LOSARTAN POTASSIUM 100 MG/1
0.5 TABLET, FILM COATED ORAL
Qty: 0 | Refills: 0 | DISCHARGE

## 2020-04-22 RX ORDER — GABAPENTIN 400 MG/1
2 CAPSULE ORAL
Qty: 0 | Refills: 0 | DISCHARGE

## 2020-04-22 RX ORDER — CEFPODOXIME PROXETIL 100 MG
200 TABLET ORAL EVERY 12 HOURS
Refills: 0 | Status: DISCONTINUED | OUTPATIENT
Start: 2020-04-22 | End: 2020-04-24

## 2020-04-22 RX ORDER — GABAPENTIN 400 MG/1
1 CAPSULE ORAL
Qty: 0 | Refills: 0 | DISCHARGE

## 2020-04-22 RX ORDER — POTASSIUM CHLORIDE 20 MEQ
1 PACKET (EA) ORAL
Qty: 0 | Refills: 0 | DISCHARGE

## 2020-04-22 RX ORDER — SODIUM CHLORIDE 9 MG/ML
1000 INJECTION, SOLUTION INTRAVENOUS
Refills: 0 | Status: DISCONTINUED | OUTPATIENT
Start: 2020-04-22 | End: 2020-04-22

## 2020-04-22 RX ORDER — SODIUM CHLORIDE 9 MG/ML
1000 INJECTION, SOLUTION INTRAVENOUS
Refills: 0 | Status: COMPLETED | OUTPATIENT
Start: 2020-04-22 | End: 2020-04-22

## 2020-04-22 RX ORDER — FAMOTIDINE 10 MG/ML
20 INJECTION INTRAVENOUS DAILY
Refills: 0 | Status: DISCONTINUED | OUTPATIENT
Start: 2020-04-22 | End: 2020-05-01

## 2020-04-22 RX ORDER — METFORMIN HYDROCHLORIDE 850 MG/1
1 TABLET ORAL
Qty: 0 | Refills: 0 | DISCHARGE

## 2020-04-22 RX ADMIN — Medication 137 MICROGRAM(S): at 05:17

## 2020-04-22 RX ADMIN — Medication 300 MILLIGRAM(S): at 10:17

## 2020-04-22 RX ADMIN — Medication 81 MILLIGRAM(S): at 10:18

## 2020-04-22 RX ADMIN — Medication 200 MILLIGRAM(S): at 17:26

## 2020-04-22 RX ADMIN — SODIUM CHLORIDE 125 MILLILITER(S): 9 INJECTION, SOLUTION INTRAVENOUS at 03:40

## 2020-04-22 RX ADMIN — Medication 1000 UNIT(S): at 17:30

## 2020-04-22 RX ADMIN — Medication 2: at 18:15

## 2020-04-22 RX ADMIN — FAMOTIDINE 20 MILLIGRAM(S): 10 INJECTION INTRAVENOUS at 12:56

## 2020-04-22 RX ADMIN — SODIUM CHLORIDE 250 MILLILITER(S): 9 INJECTION, SOLUTION INTRAVENOUS at 03:41

## 2020-04-22 RX ADMIN — INSULIN GLARGINE 30 UNIT(S): 100 INJECTION, SOLUTION SUBCUTANEOUS at 22:02

## 2020-04-22 RX ADMIN — SODIUM CHLORIDE 250 MILLILITER(S): 9 INJECTION, SOLUTION INTRAVENOUS at 07:29

## 2020-04-22 RX ADMIN — ENOXAPARIN SODIUM 40 MILLIGRAM(S): 100 INJECTION SUBCUTANEOUS at 17:31

## 2020-04-22 RX ADMIN — ATENOLOL 25 MILLIGRAM(S): 25 TABLET ORAL at 05:17

## 2020-04-22 NOTE — PROGRESS NOTE ADULT - PROBLEM SELECTOR PLAN 4
Noted Cr at 1.91, unknown baseline but noted improvement to 1.64 with 3L IVF  - urine lytes  -Hold home losartan in setting of LIZ  - avoid nephrotoxic agents

## 2020-04-22 NOTE — PROGRESS NOTE ADULT - ASSESSMENT
75M PMH of DM presented for 1 week of fevers. Patient's wife reported fall 1 week ago hitting head and noted increased fatigue/sleepiness. Found to be COVID+, with hyponatremia at [facility]. Admitted to [facility] for management of hyponatremia and COVID pneumonia

## 2020-04-22 NOTE — PROGRESS NOTE ADULT - PROBLEM SELECTOR PLAN 3
Noted persistent fevers, chills, no cough, +SOB, COVID+,+ lymphopenia, CRP 11.02, Ferritin 520  -Initially some O2 requirements, now 95% on RA  -Continue to trend inflammatory markers  -Currently on hydroxychloroquine and azithromycin (day 2 of 5)  -Placed on ceftriaxone to cover HAP, will switch to cefpodoxime for 5 days

## 2020-04-22 NOTE — PROGRESS NOTE ADULT - SUBJECTIVE AND OBJECTIVE BOX
Patient seen and examined at bedside as per COVID protocol.  Meds, vitals, labs, imaging reviewed.        Medications:    acetaminophen   Tablet .. 650 milliGRAM(s) every 6 hours PRN  allopurinol 300 milliGRAM(s) daily  aspirin enteric coated 81 milliGRAM(s) daily  ATENolol  Tablet 25 milliGRAM(s) daily  atorvastatin 20 milliGRAM(s) at bedtime  azithromycin   Tablet 250 milliGRAM(s) every 24 hours  cefpodoxime 200 milliGRAM(s) every 12 hours  cholecalciferol 1000 Unit(s) every 24 hours  dextrose 40% Gel 15 Gram(s) once PRN  dextrose 5%. 1000 milliLiter(s) <Continuous>  dextrose 5%. 1000 milliLiter(s) <Continuous>  dextrose 50% Injectable 12.5 Gram(s) once  dextrose 50% Injectable 25 Gram(s) once  dextrose 50% Injectable 25 Gram(s) once  enoxaparin Injectable 40 milliGRAM(s) every 24 hours  famotidine    Tablet 20 milliGRAM(s) daily  glucagon  Injectable 1 milliGRAM(s) once PRN  hydroxychloroquine     hydroxychloroquine 400 milliGRAM(s) every 24 hours  insulin glargine Injectable (LANTUS) 30 Unit(s) at bedtime  insulin lispro (HumaLOG) corrective regimen sliding scale   four times a day with meals  insulin lispro Injectable (HumaLOG) 5 Unit(s) three times a day before meals  levothyroxine 137 MICROGram(s) daily      Allergies    No Known Allergies    Intolerances        T(C): , Max: 39.3 (20 @ 16:26)  T(F): , Max: 102.7 (20 @ 16:26)  HR: 65 (20 @ 05:18)  BP: 125/67 (20 @ 05:18)  BP(mean): --  RR: 19 (20 @ 05:18)  SpO2: 93% (20 @ 05:18)  Wt(kg): --     @ 07:01  -   @ 07:00  --------------------------------------------------------  IN:  Total IN: 0 mL    OUT:    Voided: 800 mL  Total OUT: 800 mL    Total NET: -800 mL            ROS: Unable due to limited contact    Physical exam as per primary team due to COVID protocol      LABS:                        13.0   4.55  )-----------( 261      ( 2020 07:08 )             40.1         130<L>  |  98  |  19  ----------------------------<  220<H>  4.0   |  20<L>  |  1.62<H>    Ca    8.7      2020 07:08  Mg     1.6         TPro  6.1  /  Alb  2.9<L>  /  TBili  0.2  /  DBili  x   /  AST  42<H>  /  ALT  28  /  AlkPhos  29<L>      Osmolality, Serum: 279 mosmol/kg <L> [280 - 301] ( @ 07:08)  Osmolality, Serum: 285 mosmol/kg [280 - 301] ( @ 16:03)    PT/INR - ( 2020 11:15 )   PT: 13.9 sec;   INR: 1.25          PTT - ( 2020 11:15 )  PTT:36.9 sec  Urinalysis Basic - ( 2020 12:55 )    Color: Yellow / Appearance: Clear / S.015 / pH: x  Gluc: x / Ketone: NEGATIVE  / Bili: NEGATIVE / Urobili: 0.2 E.U./dL   Blood: x / Protein: NEGATIVE mg/dL / Nitrite: NEGATIVE   Leuk Esterase: NEGATIVE / RBC: x / WBC x   Sq Epi: x / Non Sq Epi: x / Bacteria: x      Sodium, Random Urine: 67 mmol/L ( @ 03:38)  Osmolality, Random Urine: 445 mosmol/kg [100 - 650] ( @ 03:38)        RADIOLOGY & ADDITIONAL STUDIES:

## 2020-04-22 NOTE — PROGRESS NOTE ADULT - ATTENDING COMMENTS
75yoM w/ h/o DM p/w 1 week of fever found to have hyponatremia in the setting of COVID PNA w/ course c/b toxic metabolic encephalopathy.  24Hr Events: Admitted, received D5W w/ overcorrection  Unable to participate w/ ROS  Sat'ing well on RA, encephalopathic, CTAB  WBC 4.55  Cr 1.61 (improving), Qamar 67  EKG RBBB, QTc 438  Continue Azithro/Plaquenil for COVID  Covering CAP w/ cefpodoxime  Add Famotidine  Stop D5W and monitor BMP   this AM, monitor  F/u renal recs

## 2020-04-22 NOTE — PROGRESS NOTE ADULT - ASSESSMENT
76 yo M PMH of DM admitted for covid19 related fever, pneumonia.  Nephrology consulted for Hyponatremia overcorrection.      # Hyponatremia  - Na 122 on 4/21 , up to 134 s/p 3L NS at [facility], sOsm 285 s/p hydration  - assuming chronic hypovolemic hypotonic hyponatremia in setting of poor oral intake, diarrhea  - urine Na 67, osm 445,  - Na goal 128-130 by 11am 4/22 - Na 130 on am labs today 4/22  - Please maintain Na at 130 today  - Goal Na 134 - 136 on am labs of 4/23  - strict urine output monitoring   - send urine osm  -  cc  - BMP stat then q4h for now  - will monitor         # LIZ  - unknown baseline Cr, on admission 1.9, down to 1.6 w hydration  - likely pre-renal in setting of poor oral intake, dehydration, diarrhea vs ATN 2/2 covid19 infection  - ulytes, urinalysis noted, FeNa cw pre-renal cause  - keep euvolemic  - maintain map >65-70 mmHg  - monitor BUN/Cr, lytes, uop   - send for renal sono/ bladder scan  - avoid ACE/ARB/NSAIDS/Contrast  - renal dosing of antibiotics/meds

## 2020-04-22 NOTE — PROGRESS NOTE ADULT - ATTENDING COMMENTS
hyponatremia with overcorrection- appropriate re-lowering of Na yesterday  as above target Na 134 for today  needs AM Urine osm

## 2020-04-22 NOTE — PROGRESS NOTE ADULT - PROBLEM SELECTOR PLAN 9
F: s/p 3L NS IVF in ED, with over correction- started d5  E: Replete electrolytes to maintain K>4 and Mg>2  N: DASH/TLC diet

## 2020-04-22 NOTE — PROGRESS NOTE ADULT - PROBLEM SELECTOR PLAN 2
Noted progressively worsening confusion, difficulty performing activities of daily living over last week. Associated with hyponatremia, and covid with high fevers  -Avoid sedating medications, gabapentin held  - Hyponatremia, f/u renal recs management as above  - Tx covid infection, Tylenol PRN for fevers  - f/u blood cultures for any concurrent infection  -CTH neg for acute pathology

## 2020-04-22 NOTE — PROGRESS NOTE ADULT - SUBJECTIVE AND OBJECTIVE BOX
Patient is a 75y old  Male who presents with a chief complaint of altered mental status	    SUBJECTIVE / OVERNIGHT EVENTS: Patient seen and examined at bedside. Agitated overnight     MEDICATIONS  (STANDING):  allopurinol 300 milliGRAM(s) Oral daily  aspirin enteric coated 81 milliGRAM(s) Oral daily  ATENolol  Tablet 25 milliGRAM(s) Oral daily  atorvastatin 20 milliGRAM(s) Oral at bedtime  azithromycin   Tablet 250 milliGRAM(s) Oral every 24 hours  cefTRIAXone   IVPB 1000 milliGRAM(s) IV Intermittent every 24 hours  cholecalciferol 1000 Unit(s) Oral every 24 hours  dextrose 5%. 1000 milliLiter(s) (50 mL/Hr) IV Continuous <Continuous>  dextrose 5%. 1000 milliLiter(s) (200 mL/Hr) IV Continuous <Continuous>  dextrose 50% Injectable 12.5 Gram(s) IV Push once  dextrose 50% Injectable 25 Gram(s) IV Push once  dextrose 50% Injectable 25 Gram(s) IV Push once  enoxaparin Injectable 40 milliGRAM(s) SubCutaneous every 24 hours  hydroxychloroquine   Oral   hydroxychloroquine 400 milliGRAM(s) Oral every 24 hours  insulin glargine Injectable (LANTUS) 30 Unit(s) SubCutaneous at bedtime  insulin lispro (HumaLOG) corrective regimen sliding scale   SubCutaneous four times a day with meals  insulin lispro Injectable (HumaLOG) 5 Unit(s) SubCutaneous three times a day before meals  levothyroxine 137 MICROGram(s) Oral daily    MEDICATIONS  (PRN):  acetaminophen   Tablet .. 650 milliGRAM(s) Oral every 6 hours PRN Temp greater or equal to 38C (100.4F)  dextrose 40% Gel 15 Gram(s) Oral once PRN Blood Glucose LESS THAN 70 milliGRAM(s)/deciliter  glucagon  Injectable 1 milliGRAM(s) IntraMuscular once PRN Glucose LESS THAN 70 milligrams/deciliter      Vital Signs Last 24 Hrs  T(C): 36.4 (2020 05:18), Max: 39.3 (2020 16:26)  T(F): 97.5 (2020 05:18), Max: 102.7 (2020 16:26)  HR: 65 (2020 05:18) (55 - 68)  BP: 125/67 (2020 05:18) (110/58 - 146/76)  BP(mean): --  RR: 19 (2020 05:18) (18 - 19)  SpO2: 93% (2020 05:18) (93% - 98%)    CAPILLARY BLOOD GLUCOSE  POCT Blood Glucose.: 182 mg/dL (2020 21:44)  POCT Blood Glucose.: 138 mg/dL (2020 17:12)    I&O's Summary    2020 07:01  -  2020 07:00  --------------------------------------------------------  IN: 0 mL / OUT: 800 mL / NET: -800 mL    PHYSICAL EXAM:  GENERAL: NAD, well-groomed, well-developed, resting comfortably in bed   HEAD:  Atraumatic, Normocephalic  EYES: EOMI, PERRLA, conjunctiva and sclera clear  ENMT: Moist mucus membranes   NECK: Supple  CHEST/LUNG: Bibasilar crackles appreciated. Speaking in full sentences no evidence of respiratory distress   HEART: Regular rate and rhythm; Normal S1S2; No murmurs, rubs, or gallops  ABDOMEN: Soft, Nontender, Nondistended; Bowel sounds present; No masses or HSM   EXTREMITIES:  2+ Peripheral Pulses, No clubbing, cyanosis, or edema  NERVOUS SYSTEM:  AAOX1; Unable to participate in full neuro examination but moved all four extremities. Follows basic commands intermittently with poor attention. CN II-XII grossly intact   SKIN: No rashes or lesions    LABS:                        13.0   4.55  )-----------( 261      ( 2020 07:08 )             40.1     Hgb Trend: 13.0<--, 13.9<--, 14.0<--  04-22    130<L>  |  98  |  19  ----------------------------<  220<H>  4.0   |  20<L>  |  1.62<H>    Ca    8.7      2020 07:08  Mg     1.6     04-22    TPro  6.1  /  Alb  2.9<L>  /  TBili  0.2  /  DBili  x   /  AST  42<H>  /  ALT  28  /  AlkPhos  29<L>  04-22    Creatinine Trend: 1.62<--, 1.61<--, 1.57<--, 1.64<--, 1.91<--  LIVER FUNCTIONS - ( 2020 07:08 )  Alb: 2.9 g/dL / Pro: 6.1 g/dL / ALK PHOS: 29 U/L / ALT: 28 U/L / AST: 42 U/L / GGT: x           PT/INR - ( 2020 11:15 )   PT: 13.9 sec;   INR: 1.25     PTT - ( 2020 11:15 )  PTT:36.9 sec    CARDIAC MARKERS ( 2020 11:15 )  <0.017 ng/mL / x     / x     / x     / x        Urinalysis Basic - ( 2020 12:55 )    Color: Yellow / Appearance: Clear / S.015 / pH: x  Gluc: x / Ketone: NEGATIVE  / Bili: NEGATIVE / Urobili: 0.2 E.U./dL   Blood: x / Protein: NEGATIVE mg/dL / Nitrite: NEGATIVE   Leuk Esterase: NEGATIVE / RBC: x / WBC x   Sq Epi: x / Non Sq Epi: x / Bacteria: x        RADIOLOGY & ADDITIONAL TESTS:    Imaging Personally Reviewed:    Consultant(s) Notes Reviewed:      Care Discussed with Consultants/Other Providers: Patient is a 75y old  Male who presents with a chief complaint of altered mental status	    SUBJECTIVE / OVERNIGHT EVENTS: Patient seen and examined at bedside. Agitated overnight received 25 mg Seroquel oral. This morning denies SOB, cough, chest pain, nausea, vomiting. Denies any pain.     MEDICATIONS  (STANDING):  allopurinol 300 milliGRAM(s) Oral daily  aspirin enteric coated 81 milliGRAM(s) Oral daily  ATENolol  Tablet 25 milliGRAM(s) Oral daily  atorvastatin 20 milliGRAM(s) Oral at bedtime  azithromycin   Tablet 250 milliGRAM(s) Oral every 24 hours  cefTRIAXone   IVPB 1000 milliGRAM(s) IV Intermittent every 24 hours  cholecalciferol 1000 Unit(s) Oral every 24 hours  dextrose 5%. 1000 milliLiter(s) (50 mL/Hr) IV Continuous <Continuous>  dextrose 5%. 1000 milliLiter(s) (200 mL/Hr) IV Continuous <Continuous>  dextrose 50% Injectable 12.5 Gram(s) IV Push once  dextrose 50% Injectable 25 Gram(s) IV Push once  dextrose 50% Injectable 25 Gram(s) IV Push once  enoxaparin Injectable 40 milliGRAM(s) SubCutaneous every 24 hours  hydroxychloroquine   Oral   hydroxychloroquine 400 milliGRAM(s) Oral every 24 hours  insulin glargine Injectable (LANTUS) 30 Unit(s) SubCutaneous at bedtime  insulin lispro (HumaLOG) corrective regimen sliding scale   SubCutaneous four times a day with meals  insulin lispro Injectable (HumaLOG) 5 Unit(s) SubCutaneous three times a day before meals  levothyroxine 137 MICROGram(s) Oral daily    MEDICATIONS  (PRN):  acetaminophen   Tablet .. 650 milliGRAM(s) Oral every 6 hours PRN Temp greater or equal to 38C (100.4F)  dextrose 40% Gel 15 Gram(s) Oral once PRN Blood Glucose LESS THAN 70 milliGRAM(s)/deciliter  glucagon  Injectable 1 milliGRAM(s) IntraMuscular once PRN Glucose LESS THAN 70 milligrams/deciliter      Vital Signs Last 24 Hrs  T(C): 36.4 (2020 05:18), Max: 39.3 (2020 16:26)  T(F): 97.5 (2020 05:18), Max: 102.7 (2020 16:26)  HR: 65 (2020 05:18) (55 - 68)  BP: 125/67 (2020 05:18) (110/58 - 146/76)  BP(mean): --  RR: 19 (2020 05:18) (18 - 19)  SpO2: 93% (2020 05:18) (93% - 98%)    CAPILLARY BLOOD GLUCOSE  POCT Blood Glucose.: 182 mg/dL (2020 21:44)  POCT Blood Glucose.: 138 mg/dL (2020 17:12)    I&O's Summary    2020 07:01  -  2020 07:00  --------------------------------------------------------  IN: 0 mL / OUT: 800 mL / NET: -800 mL    PHYSICAL EXAM:  GENERAL: NAD, well-groomed, well-developed, resting comfortably in bed   HEAD:  Atraumatic, Normocephalic  EYES: EOMI, PERRLA, conjunctiva and sclera clear  ENMT: Moist mucus membranes   NECK: Supple  CHEST/LUNG: Bibasilar crackles appreciated. Speaking in full sentences no evidence of respiratory distress   HEART: Regular rate and rhythm; Normal S1S2; No murmurs, rubs, or gallops  ABDOMEN: Soft, Nontender, Nondistended; Bowel sounds present; No masses or HSM   EXTREMITIES:  2+ Peripheral Pulses, No clubbing, cyanosis, or edema  NERVOUS SYSTEM:  AAOX1; Unable to participate in full neuro examination but moved all four extremities. Follows basic commands intermittently with poor attention. CN II-XII grossly intact   SKIN: No rashes or lesions    LABS:                        13.0   4.55  )-----------( 261      ( 2020 07:08 )             40.1     Hgb Trend: 13.0<--, 13.9<--, 14.0<--  04-22    130<L>  |  98  |  19  ----------------------------<  220<H>  4.0   |  20<L>  |  1.62<H>    Ca    8.7      2020 07:08  Mg     1.6         TPro  6.1  /  Alb  2.9<L>  /  TBili  0.2  /  DBili  x   /  AST  42<H>  /  ALT  28  /  AlkPhos  29<L>      Creatinine Trend: 1.62<--, 1.61<--, 1.57<--, 1.64<--, 1.91<--  LIVER FUNCTIONS - ( 2020 07:08 )  Alb: 2.9 g/dL / Pro: 6.1 g/dL / ALK PHOS: 29 U/L / ALT: 28 U/L / AST: 42 U/L / GGT: x           PT/INR - ( 2020 11:15 )   PT: 13.9 sec;   INR: 1.25     PTT - ( 2020 11:15 )  PTT:36.9 sec    CARDIAC MARKERS ( 2020 11:15 )  <0.017 ng/mL / x     / x     / x     / x        Urinalysis Basic - ( 2020 12:55 )    Color: Yellow / Appearance: Clear / S.015 / pH: x  Gluc: x / Ketone: NEGATIVE  / Bili: NEGATIVE / Urobili: 0.2 E.U./dL   Blood: x / Protein: NEGATIVE mg/dL / Nitrite: NEGATIVE   Leuk Esterase: NEGATIVE / RBC: x / WBC x   Sq Epi: x / Non Sq Epi: x / Bacteria: x    C-Reactive Protein, Serum: 11.43 mg/dL <H> (20 @ 07:08)  C-Reactive Protein, Serum: 11.02 mg/dL <H> (20 @ 16:03)  C-Reactive Protein, Serum: >12.0 mg/dL <H> (20 @ 11:15)    Ferritin, Serum: 553 ng/mL (20 @ 07:08)  Ferritin, Serum: 520 ng/mL (20 @ 16:03)    D-Dimer Assay, Quantitative (20 @ 16:03)    D-Dimer Assay, Quantitative: 161 ng/mL DDU    RADIOLOGY & ADDITIONAL TESTS:    Imaging Personally Reviewed: Yes    Consultant(s) Notes Reviewed:  Yes    < from: Xray Chest 1 View- PORTABLE-Routine (20 @ 07:47) >  IMPRESSION: Frontal view of the chest is compared to prior CT dated 2020 and demonstrates no overall interval change in dense consolidation within the left upper lobe and airspace infiltration within the bilateral perihilar regions. Normal heart size. Low lung volumes.  < end of copied text >    < from: CT Chest No Cont (20 @ 11:09) >    IMPRESSION: Bilateral lung findings as described above in detail. Covid  19 pneumonia is a possibility. Correlate with serologic testing. Differential diagnosis includes organizing pneumonia, fungal infection, granulomatosis with polyangiitis , bacterial pneumonia.    < end of copied text >    < from: CT Head No Cont (20 @ 11:09) >    Impression: No acute intracranial injury.    < end of copied text >

## 2020-04-22 NOTE — PROGRESS NOTE ADULT - PROBLEM SELECTOR PLAN 5
Hx of DM on basal 28U and premeal 6U, also on statin, ARB  - Mod ISS  - Lantus 30 qHS and premeal 5U in the context of D5W, will reevaluate as D5 is stopped  -A1C 6.6   -Adjust PRN while requiring D5    #Neuropathy  Hx of neuropathy on duloxetine and gabapentin  -will avoid in setting of metabolic encephalopathy

## 2020-04-22 NOTE — PROGRESS NOTE ADULT - PROBLEM SELECTOR PLAN 1
Noted hyponatremia to 122, on repeat on arrival to [facility] noted to be 134, patient is altered, oriented x1-2 difficulty following commands, no focal deficits but noted confusion.  -Continue following renal recs   -This morning D5W stopped following Na 130   -Legionella negative, TSH WNL (also in the setting of acute illness)  -holding duloxetine temporarily in setting of hyponatremia

## 2020-04-23 LAB
ANION GAP SERPL CALC-SCNC: 13 MMOL/L — SIGNIFICANT CHANGE UP (ref 5–17)
APPEARANCE UR: ABNORMAL
BACTERIA # UR AUTO: PRESENT /HPF
BILIRUB UR-MCNC: NEGATIVE — SIGNIFICANT CHANGE UP
BUN SERPL-MCNC: 22 MG/DL — SIGNIFICANT CHANGE UP (ref 7–23)
CALCIUM SERPL-MCNC: 9.2 MG/DL — SIGNIFICANT CHANGE UP (ref 8.4–10.5)
CHLORIDE SERPL-SCNC: 98 MMOL/L — SIGNIFICANT CHANGE UP (ref 96–108)
CO2 SERPL-SCNC: 23 MMOL/L — SIGNIFICANT CHANGE UP (ref 22–31)
COLOR SPEC: YELLOW — SIGNIFICANT CHANGE UP
CREAT ?TM UR-MCNC: 180 MG/DL — SIGNIFICANT CHANGE UP
CREAT SERPL-MCNC: 1.66 MG/DL — HIGH (ref 0.5–1.3)
DIFF PNL FLD: ABNORMAL
GLUCOSE BLDC GLUCOMTR-MCNC: 104 MG/DL — HIGH (ref 70–99)
GLUCOSE BLDC GLUCOMTR-MCNC: 105 MG/DL — HIGH (ref 70–99)
GLUCOSE BLDC GLUCOMTR-MCNC: 119 MG/DL — HIGH (ref 70–99)
GLUCOSE BLDC GLUCOMTR-MCNC: 81 MG/DL — SIGNIFICANT CHANGE UP (ref 70–99)
GLUCOSE SERPL-MCNC: 129 MG/DL — HIGH (ref 70–99)
GLUCOSE UR QL: NEGATIVE — SIGNIFICANT CHANGE UP
GRAN CASTS # UR COMP ASSIST: ABNORMAL /LPF
HYALINE CASTS # UR AUTO: SIGNIFICANT CHANGE UP /LPF (ref 0–2)
KETONES UR-MCNC: NEGATIVE — SIGNIFICANT CHANGE UP
LEUKOCYTE ESTERASE UR-ACNC: NEGATIVE — SIGNIFICANT CHANGE UP
NITRITE UR-MCNC: NEGATIVE — SIGNIFICANT CHANGE UP
OSMOLALITY UR: 582 MOSMOL/KG — SIGNIFICANT CHANGE UP (ref 100–650)
PH UR: 6 — SIGNIFICANT CHANGE UP (ref 5–8)
POTASSIUM SERPL-MCNC: 4.6 MMOL/L — SIGNIFICANT CHANGE UP (ref 3.5–5.3)
POTASSIUM SERPL-SCNC: 4.6 MMOL/L — SIGNIFICANT CHANGE UP (ref 3.5–5.3)
PROCALCITONIN SERPL-MCNC: 0.16 NG/ML — HIGH (ref 0.02–0.1)
PROT UR-MCNC: ABNORMAL MG/DL
RBC CASTS # UR COMP ASSIST: < 5 /HPF — SIGNIFICANT CHANGE UP
SODIUM SERPL-SCNC: 134 MMOL/L — LOW (ref 135–145)
SODIUM UR-SCNC: 50 MMOL/L — SIGNIFICANT CHANGE UP
SP GR SPEC: >=1.03 — SIGNIFICANT CHANGE UP (ref 1–1.03)
UROBILINOGEN FLD QL: 0.2 E.U./DL — SIGNIFICANT CHANGE UP
UUN UR-MCNC: 898 MG/DL — SIGNIFICANT CHANGE UP
WBC UR QL: < 5 /HPF — SIGNIFICANT CHANGE UP

## 2020-04-23 PROCEDURE — 99233 SBSQ HOSP IP/OBS HIGH 50: CPT | Mod: CS

## 2020-04-23 PROCEDURE — 99231 SBSQ HOSP IP/OBS SF/LOW 25: CPT

## 2020-04-23 RX ORDER — LOSARTAN POTASSIUM 100 MG/1
1 TABLET, FILM COATED ORAL
Qty: 0 | Refills: 0 | DISCHARGE

## 2020-04-23 RX ORDER — METFORMIN HYDROCHLORIDE 850 MG/1
2 TABLET ORAL
Qty: 0 | Refills: 0 | DISCHARGE

## 2020-04-23 RX ORDER — TOCILIZUMAB 20 MG/ML
400 INJECTION, SOLUTION, CONCENTRATE INTRAVENOUS ONCE
Refills: 0 | Status: COMPLETED | OUTPATIENT
Start: 2020-04-23 | End: 2020-04-23

## 2020-04-23 RX ORDER — INSULIN GLARGINE 100 [IU]/ML
25 INJECTION, SOLUTION SUBCUTANEOUS AT BEDTIME
Refills: 0 | Status: DISCONTINUED | OUTPATIENT
Start: 2020-04-23 | End: 2020-04-26

## 2020-04-23 RX ORDER — LOSARTAN POTASSIUM 100 MG/1
25 TABLET, FILM COATED ORAL DAILY
Refills: 0 | Status: DISCONTINUED | OUTPATIENT
Start: 2020-04-23 | End: 2020-04-27

## 2020-04-23 RX ADMIN — Medication 400 MILLIGRAM(S): at 00:31

## 2020-04-23 RX ADMIN — AZITHROMYCIN 250 MILLIGRAM(S): 500 TABLET, FILM COATED ORAL at 00:10

## 2020-04-23 RX ADMIN — Medication 200 MILLIGRAM(S): at 18:56

## 2020-04-23 RX ADMIN — Medication 200 MILLIGRAM(S): at 06:44

## 2020-04-23 RX ADMIN — ATORVASTATIN CALCIUM 20 MILLIGRAM(S): 80 TABLET, FILM COATED ORAL at 22:08

## 2020-04-23 RX ADMIN — Medication 81 MILLIGRAM(S): at 12:27

## 2020-04-23 RX ADMIN — ATORVASTATIN CALCIUM 20 MILLIGRAM(S): 80 TABLET, FILM COATED ORAL at 00:10

## 2020-04-23 RX ADMIN — TOCILIZUMAB 100 MILLIGRAM(S): 20 INJECTION, SOLUTION, CONCENTRATE INTRAVENOUS at 10:10

## 2020-04-23 RX ADMIN — Medication 1000 UNIT(S): at 18:56

## 2020-04-23 RX ADMIN — FAMOTIDINE 20 MILLIGRAM(S): 10 INJECTION INTRAVENOUS at 12:26

## 2020-04-23 RX ADMIN — INSULIN GLARGINE 25 UNIT(S): 100 INJECTION, SOLUTION SUBCUTANEOUS at 22:09

## 2020-04-23 RX ADMIN — Medication 137 MICROGRAM(S): at 06:44

## 2020-04-23 RX ADMIN — Medication 400 MILLIGRAM(S): at 22:16

## 2020-04-23 RX ADMIN — ENOXAPARIN SODIUM 40 MILLIGRAM(S): 100 INJECTION SUBCUTANEOUS at 18:57

## 2020-04-23 RX ADMIN — Medication 62.5 MILLIMOLE(S): at 10:27

## 2020-04-23 RX ADMIN — Medication 650 MILLIGRAM(S): at 06:44

## 2020-04-23 RX ADMIN — Medication 300 MILLIGRAM(S): at 12:26

## 2020-04-23 RX ADMIN — AZITHROMYCIN 250 MILLIGRAM(S): 500 TABLET, FILM COATED ORAL at 22:25

## 2020-04-23 RX ADMIN — ATENOLOL 25 MILLIGRAM(S): 25 TABLET ORAL at 06:44

## 2020-04-23 NOTE — PROGRESS NOTE ADULT - ATTENDING COMMENTS
24Hr Events: Febrile this morning  Pt complaining about blood draws and IV, otherwise ROS neg  Tm/c 100.6, 92% on RA, MS improved but not at baseline per wife  Ddimer neg but CRP, AST/ALT, Cr increasing  75yoM w/ h/o DM p/w fever, AMS found to have hyponatremia in setting of COVID w/ course c/b toxic metabolic encephalopathy.  Given fever, worsening labs, receiving toci this morning  To complete Azithro/Plaquenil  IVF  PT

## 2020-04-23 NOTE — CHART NOTE - NSCHARTNOTEFT_GEN_A_CORE
Admitting Diagnosis:   Patient is a 75y old  Male who presents with a chief complaint of AMS (23 Apr 2020 09:05)      Consult: Yes [   ]  No [ x  ]    Reason for Initial Nutrition Assessment: LOS      PAST MEDICAL & SURGICAL HISTORY:  Type 2 diabetes mellitus      Current Nutrition Order: Low Na, C-CHO with no snack     PO Intake: Excellent (%) [   ]  Good (50-75%) [   ]  Fair (25-50%) [   ]  Poor (<25%) [x   ]Per wife and RN, needs assistance with feeding.Po has been poor to fair as per RN.Unable to quantify % intake    GI Issues: none    Pain:none reported  Skin Integrity:Intact PU wise    Labs:   04-23    131<L>  |  96  |  20  ----------------------------<  83  4.2   |  22  |  1.84<H>    Ca    9.5      23 Apr 2020 06:32  Phos  1.9     04-23  Mg     1.6     04-23    TPro  6.6  /  Alb  3.3  /  TBili  0.4  /  DBili  x   /  AST  81<H>  /  ALT  59<H>  /  AlkPhos  33<L>  04-23    CAPILLARY BLOOD GLUCOSE      POCT Blood Glucose.: 81 mg/dL (23 Apr 2020 08:00)  POCT Blood Glucose.: 95 mg/dL (22 Apr 2020 22:07)  POCT Blood Glucose.: 153 mg/dL (22 Apr 2020 17:19)  POCT Blood Glucose.: 155 mg/dL (22 Apr 2020 11:47)    Nutritionally Pertinent Lab Values:Na:133 HGBA1C;6.6    Medications:  MEDICATIONS  (STANDING):  allopurinol 300 milliGRAM(s) Oral daily  aspirin enteric coated 81 milliGRAM(s) Oral daily  atorvastatin 20 milliGRAM(s) Oral at bedtime  azithromycin   Tablet 250 milliGRAM(s) Oral every 24 hours  cefpodoxime 200 milliGRAM(s) Oral every 12 hours  cholecalciferol 1000 Unit(s) Oral every 24 hours  dextrose 5%. 1000 milliLiter(s) (50 mL/Hr) IV Continuous <Continuous>  dextrose 50% Injectable 12.5 Gram(s) IV Push once  dextrose 50% Injectable 25 Gram(s) IV Push once  dextrose 50% Injectable 25 Gram(s) IV Push once  enoxaparin Injectable 40 milliGRAM(s) SubCutaneous every 24 hours  famotidine    Tablet 20 milliGRAM(s) Oral daily  hydroxychloroquine   Oral   hydroxychloroquine 400 milliGRAM(s) Oral every 24 hours  insulin glargine Injectable (LANTUS) 25 Unit(s) SubCutaneous at bedtime  insulin lispro (HumaLOG) corrective regimen sliding scale   SubCutaneous four times a day with meals  levothyroxine 137 MICROGram(s) Oral daily  losartan 25 milliGRAM(s) Oral daily    MEDICATIONS  (PRN):  acetaminophen   Tablet .. 650 milliGRAM(s) Oral every 6 hours PRN Temp greater or equal to 38C (100.4F)  dextrose 40% Gel 15 Gram(s) Oral once PRN Blood Glucose LESS THAN 70 milliGRAM(s)/deciliter  glucagon  Injectable 1 milliGRAM(s) IntraMuscular once PRN Glucose LESS THAN 70 milligrams/deciliter      Admitted Anthropometrics:Height:6ft IBW:178lbs +/-10%,107% of IBW.Stated per wife patients UBW:190lbs and was weighing about 182lbs PTA.BMI:25.8    Weight:Height:6ft   Daily     Daily 86.2kg    Weight Change: per wife UBW:190lbs and weight at home was 182lbs.Weights recorded now are 190lbs(?accuracy)    Nutrition Focused Physical Exam: Completed [   ]  Unable to complete [  x ]Unable to complete due to isolation demands and unable to do a face to face  Muscle Wasting:  Subcutaneous Fat Wasting:    Estimated energy needs: Based on ABW due to between % of IBW.ABW:86.2kg x38-92kzbg:2155kcal-2586kcal and 1-1.2gmprotein(Due to COVID-19+ demands):86.2-103gmprotein and fluids per team    Subjective: 76y/o male with history of 2DM admitted with change in mental status and hyponatremia s/p fall and found to have COVID-19+.Per discussion with wife on phone, patient was AOX3 1 week ago. Was eating a healthy diet with avoidance of certain CHO ie: bread/ rice/ pasta/ juice. Patient loves fresh fruit, lean protein sources. Per RN, needs some assistance and encouragement with po now. Some agitation reported. RD attempted to contact patient, but he would not answer phone. RD informed wife that her  would be assisted with meals and RD will attempt to accommodate with noted food preferences.     Nutrition Diagnosis: Increased nutrient needs r/t increased demand for protein and nutrients AEB: COVID-19+ hypermetabolic demands    [  ] No active nutrition diagnosis at this time  [  ] Current medical condition precludes nutrition intervention    Goal :Meet >75% of EER consistently    Recommendations:1.Please assist with tray set up and feeding of patient  2.Please repeat weights to assess any changes  3.Honor food requests(RD will attempt to  include foods of preference  4,Consider addition of ONS(Glucerna shake BID(440kcal and 20gmprotein) for ease in consumption of kcal.      Education: Reviewed diet with wife.Adequate knowledge and appears patient follows an appropriate [facility] diet at home    Risk Level: High [   ] Moderate [  x ] Low [   ]

## 2020-04-23 NOTE — PROGRESS NOTE ADULT - PROBLEM SELECTOR PLAN 3
Noted persistent fevers, chills, no cough, +SOB, COVID+,+ lymphopenia, CRP 11.02, Ferritin 520  -Worsening O2 requirements this morning (4/23) 86-89% on RA, 94% on 4L NC   -Continue to trend inflammatory markers  -Currently on hydroxychloroquine and azithromycin (day 3 of 5)  -Placed on cefpodoxime to cover CAP, will switch to cefpodoxime for 5 days (day 3/5)  -4/23 uptrending inflammatory markers and increasing oxygen requirements --given Tocilizumab

## 2020-04-23 NOTE — PROGRESS NOTE ADULT - PROBLEM SELECTOR PLAN 9
F: s/p 3L NS IVF in ED, with over correction- started d5, now on no fluids   E: Replete electrolytes to maintain K>4 and Mg>2  N: DASH/TLC diet

## 2020-04-23 NOTE — PROGRESS NOTE ADULT - PROBLEM SELECTOR PLAN 5
Hx of DM on basal 28U and premeal 6U, also on statin, ARB  - Mod ISS  - Change Lantus 25 qHS and hold premeal --> will see requirements from sliding scale  -A1C 6.6     #Neuropathy  Hx of neuropathy on duloxetine and gabapentin  -will avoid in setting of metabolic encephalopathy

## 2020-04-23 NOTE — PROGRESS NOTE ADULT - ASSESSMENT
76 yo M PMH of DM admitted for covid19 related fever, pneumonia.  Nephrology consulted for Hyponatremia overcorrection.      Hyponatremia  with rapid correction and appropriate relowering of Na  today Na stable at 131- but no further rise in level-  limit free water orally and in meds as much as possible  needs repeat Urine osm and Na with chem panel in AM   LIZ  creat up a bit to 1.84- remains in accpetable range  if creat up again tomorrow will need post void bladder sonogram and repeat u/a   avoid ACE/ARB/NSAIDS/Contrast   renal dosing of antibiotics/meds

## 2020-04-23 NOTE — PROGRESS NOTE ADULT - SUBJECTIVE AND OBJECTIVE BOX
Patient is a 75y old  Male who presents with a chief complaint of Hyponatremia, COVID (2020 10:09)    SUBJECTIVE / OVERNIGHT EVENTS: Patient seen and examined at bedside. No acute events overnight. Found sleeping in bed comfortably, SpO2 86-87% on room air. When awake, increased to 89% so placed on NC. Now SpO2 93% on 4L. Mental status improved, communicating with provider and AAOx3.     MEDICATIONS  (STANDING):  allopurinol 300 milliGRAM(s) Oral daily  aspirin enteric coated 81 milliGRAM(s) Oral daily  atorvastatin 20 milliGRAM(s) Oral at bedtime  azithromycin   Tablet 250 milliGRAM(s) Oral every 24 hours  cefpodoxime 200 milliGRAM(s) Oral every 12 hours  cholecalciferol 1000 Unit(s) Oral every 24 hours  dextrose 5%. 1000 milliLiter(s) (50 mL/Hr) IV Continuous <Continuous>  dextrose 50% Injectable 12.5 Gram(s) IV Push once  dextrose 50% Injectable 25 Gram(s) IV Push once  dextrose 50% Injectable 25 Gram(s) IV Push once  enoxaparin Injectable 40 milliGRAM(s) SubCutaneous every 24 hours  famotidine    Tablet 20 milliGRAM(s) Oral daily  hydroxychloroquine   Oral   hydroxychloroquine 400 milliGRAM(s) Oral every 24 hours  insulin glargine Injectable (LANTUS) 30 Unit(s) SubCutaneous at bedtime  insulin lispro (HumaLOG) corrective regimen sliding scale   SubCutaneous four times a day with meals  insulin lispro Injectable (HumaLOG) 5 Unit(s) SubCutaneous three times a day before meals  levothyroxine 137 MICROGram(s) Oral daily  sodium phosphate IVPB 15 milliMole(s) IV Intermittent once  tocilizumab IVPB 400 milliGRAM(s) IV Intermittent once    MEDICATIONS  (PRN):  acetaminophen   Tablet .. 650 milliGRAM(s) Oral every 6 hours PRN Temp greater or equal to 38C (100.4F)  dextrose 40% Gel 15 Gram(s) Oral once PRN Blood Glucose LESS THAN 70 milliGRAM(s)/deciliter  glucagon  Injectable 1 milliGRAM(s) IntraMuscular once PRN Glucose LESS THAN 70 milligrams/deciliter    Vital Signs Last 24 Hrs  T(C): 38.1 (2020 06:30), Max: 38.1 (2020 06:30)  T(F): 100.6 (2020 06:30), Max: 100.6 (2020 06:30)  HR: 66 (2020 06:30) (61 - 74)  BP: 128/68 (2020 06:30) (108/57 - 128/68)  BP(mean): --  RR: 20 (2020 06:30) (17 - 20)  SpO2: 92% (2020 06:30) (92% - 94%)  CAPILLARY BLOOD GLUCOSE      POCT Blood Glucose.: 81 mg/dL (2020 08:00)  POCT Blood Glucose.: 95 mg/dL (2020 22:07)  POCT Blood Glucose.: 153 mg/dL (2020 17:19)  POCT Blood Glucose.: 155 mg/dL (2020 11:47)    I&O's Summary    2020 07:01  -  2020 07:00  --------------------------------------------------------  IN: 360 mL / OUT: 600 mL / NET: -240 mL    PHYSICAL EXAM:  GENERAL: NAD, resting comfortably in bed   HEAD:  Atraumatic, Normocephalic  EYES: EOMI, PERRLA, conjunctiva and sclera clear  ENMT: Moist mucous membranes  NECK: Supple  CHEST/LUNG: Clear to auscultation bilaterally; No rales, rhonchi, wheezing, or rubs  HEART: Regular rate and rhythm; Normal S1S2; No murmurs, rubs, or gallops  ABDOMEN: Soft, Nontender, Nondistended; Bowel sounds present; No masses or HSM   EXTREMITIES:  2+ Peripheral Pulses, No clubbing, cyanosis, or edema  NERVOUS SYSTEM:  AAOX3 (person, place, year, president); Moving all four extremities well, following basic commands, CN II-XII grossly in tact  SKIN: No rashes or lesions    LABS:                        14.1   6.19  )-----------( 297      ( 2020 06:32 )             42.2     Hgb Trend: 14.1<--, 13.0<--, 13.9<--, 14.0<--  23    131<L>  |  96  |  20  ----------------------------<  83  4.2   |  22  |  1.84<H>    Ca    9.5      2020 06:32  Phos  1.9       Mg     1.6         TPro  6.6  /  Alb  3.3  /  TBili  0.4  /  DBili  x   /  AST  81<H>  /  ALT  59<H>  /  AlkPhos  33<L>      Creatinine Trend: 1.84<--, 1.64<--, 1.62<--, 1.61<--, 1.57<--, 1.64<--  LIVER FUNCTIONS - ( 2020 06:32 )  Alb: 3.3 g/dL / Pro: 6.6 g/dL / ALK PHOS: 33 U/L / ALT: 59 U/L / AST: 81 U/L / GGT: x           PT/INR - ( 2020 11:15 )   PT: 13.9 sec;   INR: 1.25        PTT - ( 2020 11:15 )  PTT:36.9 sec  CARDIAC MARKERS ( 2020 11:15 )  <0.017 ng/mL / x     / x     / x     / x        Urinalysis Basic - ( 2020 12:55 )    Color: Yellow / Appearance: Clear / S.015 / pH: x  Gluc: x / Ketone: NEGATIVE  / Bili: NEGATIVE / Urobili: 0.2 E.U./dL   Blood: x / Protein: NEGATIVE mg/dL / Nitrite: NEGATIVE   Leuk Esterase: NEGATIVE / RBC: x / WBC x   Sq Epi: x / Non Sq Epi: x / Bacteria: x    C-Reactive Protein, Serum: 16.03 mg/dL <H> (20 @ 06:32)  C-Reactive Protein, Serum: 11.43 mg/dL <H> (20 @ 07:08)  C-Reactive Protein, Serum: 11.02 mg/dL <H> (20 @ 16:03)  C-Reactive Protein, Serum: >12.0 mg/dL <H> (20 @ 11:15)    Ferritin, Serum: 753 ng/mL (20 @ 06:32)  Ferritin, Serum: 553 ng/mL (20 @ 07:08)  Ferritin, Serum: 520 ng/mL (20 @ 16:03)    D-Dimer Assay, Quantitative (20 @ 06:32)    D-Dimer Assay, Quantitative: 229 ng/mL DDU    RADIOLOGY & ADDITIONAL TESTS:  No new radiology

## 2020-04-23 NOTE — PROGRESS NOTE ADULT - PROBLEM SELECTOR PLAN 2
Noted progressively worsening confusion, difficulty performing activities of daily living over last week. CT head negative. Associated with hyponatremia, and covid with high fevers  -Avoid sedating medications, gabapentin held  - Hyponatremia, f/u renal recs management as above  - Tx covid infection, Tylenol PRN for fevers

## 2020-04-23 NOTE — PROGRESS NOTE ADULT - PROBLEM SELECTOR PLAN 1
Noted hyponatremia to 122, on repeat on arrival to [facility] noted to be 134, patient was initially AAOx1-2, now AAOx3 with mental status improving. Legionella and TSH negative.   -Continue following renal recs   -D5W d/c 4/22. 4/23 Na 131   -holding duloxetine temporarily in setting of hyponatremia

## 2020-04-23 NOTE — PROGRESS NOTE ADULT - PROBLEM SELECTOR PLAN 4
Noted Cr at 1.91, unknown baseline but noted improvement to 1.64 with 3L IVF. Possible COVID component to kidney injury as well.   - urine lytes   -Hold home losartan in setting of LIZ  - avoid nephrotoxic agents

## 2020-04-23 NOTE — PROGRESS NOTE ADULT - ASSESSMENT
75M PMH of DM presented for 1 week of fevers. Patient's wife reported fall 1 week ago hitting head and noted increased fatigue/sleepiness. Found to be COVID+, with hyponatremia at [facility]. Admitted to [facility] for management of hyponatremia and COVID pneumonia

## 2020-04-24 LAB
G6PD RBC-CCNC: 13.2 U/G HGB — SIGNIFICANT CHANGE UP (ref 7–20.5)
GAMMA INTERFERON BACKGROUND BLD IA-ACNC: 0.3 IU/ML — SIGNIFICANT CHANGE UP
GLUCOSE BLDC GLUCOMTR-MCNC: 127 MG/DL — HIGH (ref 70–99)
GLUCOSE BLDC GLUCOMTR-MCNC: 136 MG/DL — HIGH (ref 70–99)
GLUCOSE BLDC GLUCOMTR-MCNC: 88 MG/DL — SIGNIFICANT CHANGE UP (ref 70–99)
GLUCOSE BLDC GLUCOMTR-MCNC: 95 MG/DL — SIGNIFICANT CHANGE UP (ref 70–99)
M TB IFN-G BLD-IMP: NEGATIVE — SIGNIFICANT CHANGE UP
M TB IFN-G CD4+ BCKGRND COR BLD-ACNC: 0.01 IU/ML — SIGNIFICANT CHANGE UP
M TB IFN-G CD4+CD8+ BCKGRND COR BLD-ACNC: 0.01 IU/ML — SIGNIFICANT CHANGE UP
QUANT TB PLUS MITOGEN MINUS NIL: 5.73 IU/ML — SIGNIFICANT CHANGE UP

## 2020-04-24 PROCEDURE — 71045 X-RAY EXAM CHEST 1 VIEW: CPT | Mod: 26

## 2020-04-24 PROCEDURE — 99231 SBSQ HOSP IP/OBS SF/LOW 25: CPT

## 2020-04-24 PROCEDURE — 99233 SBSQ HOSP IP/OBS HIGH 50: CPT | Mod: CS

## 2020-04-24 RX ORDER — PIPERACILLIN AND TAZOBACTAM 4; .5 G/20ML; G/20ML
3.38 INJECTION, POWDER, LYOPHILIZED, FOR SOLUTION INTRAVENOUS EVERY 6 HOURS
Refills: 0 | Status: DISCONTINUED | OUTPATIENT
Start: 2020-04-24 | End: 2020-04-27

## 2020-04-24 RX ADMIN — Medication 81 MILLIGRAM(S): at 11:29

## 2020-04-24 RX ADMIN — Medication 400 MILLIGRAM(S): at 22:16

## 2020-04-24 RX ADMIN — AZITHROMYCIN 250 MILLIGRAM(S): 500 TABLET, FILM COATED ORAL at 22:23

## 2020-04-24 RX ADMIN — PIPERACILLIN AND TAZOBACTAM 200 GRAM(S): 4; .5 INJECTION, POWDER, LYOPHILIZED, FOR SOLUTION INTRAVENOUS at 14:20

## 2020-04-24 RX ADMIN — INSULIN GLARGINE 25 UNIT(S): 100 INJECTION, SOLUTION SUBCUTANEOUS at 22:15

## 2020-04-24 RX ADMIN — PIPERACILLIN AND TAZOBACTAM 200 GRAM(S): 4; .5 INJECTION, POWDER, LYOPHILIZED, FOR SOLUTION INTRAVENOUS at 17:21

## 2020-04-24 RX ADMIN — ENOXAPARIN SODIUM 40 MILLIGRAM(S): 100 INJECTION SUBCUTANEOUS at 17:14

## 2020-04-24 RX ADMIN — FAMOTIDINE 20 MILLIGRAM(S): 10 INJECTION INTRAVENOUS at 11:29

## 2020-04-24 RX ADMIN — Medication 300 MILLIGRAM(S): at 11:29

## 2020-04-24 RX ADMIN — ATORVASTATIN CALCIUM 20 MILLIGRAM(S): 80 TABLET, FILM COATED ORAL at 22:15

## 2020-04-24 RX ADMIN — Medication 200 MILLIGRAM(S): at 06:56

## 2020-04-24 RX ADMIN — Medication 137 MICROGRAM(S): at 07:00

## 2020-04-24 RX ADMIN — LOSARTAN POTASSIUM 25 MILLIGRAM(S): 100 TABLET, FILM COATED ORAL at 06:56

## 2020-04-24 RX ADMIN — Medication 1000 UNIT(S): at 17:22

## 2020-04-24 NOTE — PROGRESS NOTE ADULT - PROBLEM SELECTOR PLAN 1
Noted hyponatremia to 122, on repeat on arrival to [facility] noted to be 134, patient was initially AAOx1-2, now AAOx3 with mental status improving. Legionella and TSH negative.   -Continue following renal recs, fluid restriction   -D5W d/c 4/22. 4/24 Na 134  -holding duloxetine temporarily in setting of hyponatremia

## 2020-04-24 NOTE — PHYSICAL THERAPY INITIAL EVALUATION ADULT - GENERAL OBSERVATIONS, REHAB EVAL
Patient received supine in bed in no acute distress, +IV, bed alarm, O2 nc underneath patient (SpO2 83%RA).

## 2020-04-24 NOTE — PROGRESS NOTE ADULT - SUBJECTIVE AND OBJECTIVE BOX
Patient is a 75y old  Male who presents with a chief complaint of covid (23 Apr 2020 14:38)    SUBJECTIVE / OVERNIGHT EVENTS: Patient seen and examined at bedside. No acute events overnight. Denies any complaints, except wanting to go home. Denies SOB, cough, CP, nausea, vomiting, diarrhea. Is urinating well to condom catheter, does not remember last bowel movement     MEDICATIONS  (STANDING):  allopurinol 300 milliGRAM(s) Oral daily  aspirin enteric coated 81 milliGRAM(s) Oral daily  atorvastatin 20 milliGRAM(s) Oral at bedtime  azithromycin   Tablet 250 milliGRAM(s) Oral every 24 hours  cefpodoxime 200 milliGRAM(s) Oral every 12 hours  cholecalciferol 1000 Unit(s) Oral every 24 hours  dextrose 5%. 1000 milliLiter(s) (50 mL/Hr) IV Continuous <Continuous>  dextrose 50% Injectable 12.5 Gram(s) IV Push once  dextrose 50% Injectable 25 Gram(s) IV Push once  dextrose 50% Injectable 25 Gram(s) IV Push once  enoxaparin Injectable 40 milliGRAM(s) SubCutaneous every 24 hours  famotidine    Tablet 20 milliGRAM(s) Oral daily  hydroxychloroquine   Oral   hydroxychloroquine 400 milliGRAM(s) Oral every 24 hours  insulin glargine Injectable (LANTUS) 25 Unit(s) SubCutaneous at bedtime  insulin lispro (HumaLOG) corrective regimen sliding scale   SubCutaneous four times a day with meals  levothyroxine 137 MICROGram(s) Oral daily  losartan 25 milliGRAM(s) Oral daily    MEDICATIONS  (PRN):  acetaminophen   Tablet .. 650 milliGRAM(s) Oral every 6 hours PRN Temp greater or equal to 38C (100.4F)  dextrose 40% Gel 15 Gram(s) Oral once PRN Blood Glucose LESS THAN 70 milliGRAM(s)/deciliter  glucagon  Injectable 1 milliGRAM(s) IntraMuscular once PRN Glucose LESS THAN 70 milligrams/deciliter      Vital Signs Last 24 Hrs  T(C): 37 (24 Apr 2020 06:07), Max: 37 (24 Apr 2020 06:07)  T(F): 98.6 (24 Apr 2020 06:07), Max: 98.6 (24 Apr 2020 06:07)  HR: 96 (24 Apr 2020 06:07) (60 - 96)  BP: 116/61 (24 Apr 2020 06:07) (110/67 - 131/69)  BP(mean): --  RR: 19 (24 Apr 2020 06:07) (19 - 22)  SpO2: 97% (24 Apr 2020 06:07) (92% - 97%)    CAPILLARY BLOOD GLUCOSE    POCT Blood Glucose.: 104 mg/dL (23 Apr 2020 22:19)  POCT Blood Glucose.: 119 mg/dL (23 Apr 2020 17:07)  POCT Blood Glucose.: 105 mg/dL (23 Apr 2020 12:00)    I&O's Summary    PHYSICAL EXAM:  GENERAL: NAD, well-groomed, well-developed, resting comfortably in bed   HEAD:  Atraumatic, Normocephalic  EYES: EOMI, PERRLA, conjunctiva and sclera clear  ENMT: No tonsillar erythema, exudates, or enlargement; Moist mucous membranes, Good dentition, No lesions  NECK: Supple  CHEST/LUNG: Clear to auscultation bilaterally; No rales, rhonchi, wheezing, or rubs  HEART: Regular rate and rhythm; Normal S1S2; No murmurs, rubs, or gallops  ABDOMEN: Soft, Nontender, Nondistended; Bowel sounds present; No masses or HSM   EXTREMITIES:  2+ Peripheral Pulses, No clubbing, cyanosis, or edema  NERVOUS SYSTEM:  AAOX3; Moving all four extremities well; CN II-XII grossly intact   SKIN: No rashes or lesions    LABS:                        14.1   6.19  )-----------( 297      ( 23 Apr 2020 06:32 )             42.2     Hgb Trend: 14.1<--, 13.0<--, 13.9<--, 14.0<--  04-23    134<L>  |  98  |  22  ----------------------------<  129<H>  4.6   |  23  |  1.66<H>    Ca    9.2      23 Apr 2020 15:47  Phos  1.9     04-23  Mg     1.6     04-23    TPro  6.6  /  Alb  3.3  /  TBili  0.4  /  DBili  x   /  AST  81<H>  /  ALT  59<H>  /  AlkPhos  33<L>  04-23    Creatinine Trend: 1.66<--, 1.84<--, 1.64<--, 1.62<--, 1.61<--, 1.57<--  LIVER FUNCTIONS - ( 23 Apr 2020 06:32 )  Alb: 3.3 g/dL / Pro: 6.6 g/dL / ALK PHOS: 33 U/L / ALT: 59 U/L / AST: 81 U/L / GGT: x           Urinalysis Basic - ( 23 Apr 2020 16:21 )    Color: Yellow / Appearance: SL Cloudy / SG: >=1.030 / pH: x  Gluc: x / Ketone: NEGATIVE  / Bili: Negative / Urobili: 0.2 E.U./dL   Blood: x / Protein: Trace mg/dL / Nitrite: NEGATIVE   Leuk Esterase: NEGATIVE / RBC: < 5 /HPF / WBC < 5 /HPF   Sq Epi: x / Non Sq Epi: x / Bacteria: Present /HPF    C-Reactive Protein, Serum: 16.03 mg/dL <H> (04-23-20 @ 06:32)  C-Reactive Protein, Serum: 11.43 mg/dL <H> (04-22-20 @ 07:08)  C-Reactive Protein, Serum: 11.02 mg/dL <H> (04-21-20 @ 16:03)  C-Reactive Protein, Serum: >12.0 mg/dL <H> (04-21-20 @ 11:15)    Ferritin, Serum: 753 ng/mL (04-23-20 @ 06:32)  Ferritin, Serum: 553 ng/mL (04-22-20 @ 07:08)  Ferritin, Serum: 520 ng/mL (04-21-20 @ 16:03)    D-Dimer Assay, Quantitative (04.23.20 @ 16:32)    D-Dimer Assay, Quantitative: 307 ng/mL DDU    RADIOLOGY & ADDITIONAL TESTS:  No additional imaging studies Patient is a 75y old  Male who presents with a chief complaint of covid (23 Apr 2020 14:38)    SUBJECTIVE / OVERNIGHT EVENTS: Patient seen and examined at bedside. No acute events overnight. Denies any complaints, except wanting to go home. Denies SOB, cough, CP, nausea, vomiting, diarrhea. Is urinating well to condom catheter, does not remember last bowel movement     MEDICATIONS  (STANDING):  allopurinol 300 milliGRAM(s) Oral daily  aspirin enteric coated 81 milliGRAM(s) Oral daily  atorvastatin 20 milliGRAM(s) Oral at bedtime  azithromycin   Tablet 250 milliGRAM(s) Oral every 24 hours  cefpodoxime 200 milliGRAM(s) Oral every 12 hours  cholecalciferol 1000 Unit(s) Oral every 24 hours  dextrose 5%. 1000 milliLiter(s) (50 mL/Hr) IV Continuous <Continuous>  dextrose 50% Injectable 12.5 Gram(s) IV Push once  dextrose 50% Injectable 25 Gram(s) IV Push once  dextrose 50% Injectable 25 Gram(s) IV Push once  enoxaparin Injectable 40 milliGRAM(s) SubCutaneous every 24 hours  famotidine    Tablet 20 milliGRAM(s) Oral daily  hydroxychloroquine   Oral   hydroxychloroquine 400 milliGRAM(s) Oral every 24 hours  insulin glargine Injectable (LANTUS) 25 Unit(s) SubCutaneous at bedtime  insulin lispro (HumaLOG) corrective regimen sliding scale   SubCutaneous four times a day with meals  levothyroxine 137 MICROGram(s) Oral daily  losartan 25 milliGRAM(s) Oral daily    MEDICATIONS  (PRN):  acetaminophen   Tablet .. 650 milliGRAM(s) Oral every 6 hours PRN Temp greater or equal to 38C (100.4F)  dextrose 40% Gel 15 Gram(s) Oral once PRN Blood Glucose LESS THAN 70 milliGRAM(s)/deciliter  glucagon  Injectable 1 milliGRAM(s) IntraMuscular once PRN Glucose LESS THAN 70 milligrams/deciliter      Vital Signs Last 24 Hrs  T(C): 37 (24 Apr 2020 06:07), Max: 37 (24 Apr 2020 06:07)  T(F): 98.6 (24 Apr 2020 06:07), Max: 98.6 (24 Apr 2020 06:07)  HR: 96 (24 Apr 2020 06:07) (60 - 96)  BP: 116/61 (24 Apr 2020 06:07) (110/67 - 131/69)  BP(mean): --  RR: 19 (24 Apr 2020 06:07) (19 - 22)  SpO2: 97% (24 Apr 2020 06:07) (92% - 97%)    CAPILLARY BLOOD GLUCOSE    POCT Blood Glucose.: 104 mg/dL (23 Apr 2020 22:19)  POCT Blood Glucose.: 119 mg/dL (23 Apr 2020 17:07)  POCT Blood Glucose.: 105 mg/dL (23 Apr 2020 12:00)    I&O's Summary    PHYSICAL EXAM:  GENERAL: NAD, well-groomed, well-developed, resting comfortably in bed   HEAD:  Atraumatic, Normocephalic  EYES: EOMI, PERRLA, conjunctiva and sclera clear  ENMT: No tonsillar erythema, exudates, or enlargement; Moist mucous membranes, Good dentition, No lesions  NECK: Supple  CHEST/LUNG: Clear to auscultation bilaterally; No rales, rhonchi, wheezing, or rubs; no respiratory distress, speaking in full sentences 87% on RA--> 95% on 5L  HEART: Regular rate and rhythm; Normal S1S2; No murmurs, rubs, or gallops  ABDOMEN: Soft, Nontender, Nondistended; Bowel sounds present; No masses or HSM   EXTREMITIES:  2+ Peripheral Pulses, No clubbing, cyanosis, or edema  NERVOUS SYSTEM:  AAOX3; Moving all four extremities well; CN II-XII grossly intact   SKIN: No rashes or lesions    LABS:                        14.1   6.19  )-----------( 297      ( 23 Apr 2020 06:32 )             42.2     Hgb Trend: 14.1<--, 13.0<--, 13.9<--, 14.0<--  04-23    134<L>  |  98  |  22  ----------------------------<  129<H>  4.6   |  23  |  1.66<H>    Ca    9.2      23 Apr 2020 15:47  Phos  1.9     04-23  Mg     1.6     04-23    TPro  6.6  /  Alb  3.3  /  TBili  0.4  /  DBili  x   /  AST  81<H>  /  ALT  59<H>  /  AlkPhos  33<L>  04-23    Creatinine Trend: 1.66<--, 1.84<--, 1.64<--, 1.62<--, 1.61<--, 1.57<--  LIVER FUNCTIONS - ( 23 Apr 2020 06:32 )  Alb: 3.3 g/dL / Pro: 6.6 g/dL / ALK PHOS: 33 U/L / ALT: 59 U/L / AST: 81 U/L / GGT: x           Urinalysis Basic - ( 23 Apr 2020 16:21 )    Color: Yellow / Appearance: SL Cloudy / SG: >=1.030 / pH: x  Gluc: x / Ketone: NEGATIVE  / Bili: Negative / Urobili: 0.2 E.U./dL   Blood: x / Protein: Trace mg/dL / Nitrite: NEGATIVE   Leuk Esterase: NEGATIVE / RBC: < 5 /HPF / WBC < 5 /HPF   Sq Epi: x / Non Sq Epi: x / Bacteria: Present /HPF    C-Reactive Protein, Serum: 16.03 mg/dL <H> (04-23-20 @ 06:32)  C-Reactive Protein, Serum: 11.43 mg/dL <H> (04-22-20 @ 07:08)  C-Reactive Protein, Serum: 11.02 mg/dL <H> (04-21-20 @ 16:03)  C-Reactive Protein, Serum: >12.0 mg/dL <H> (04-21-20 @ 11:15)    Ferritin, Serum: 753 ng/mL (04-23-20 @ 06:32)  Ferritin, Serum: 553 ng/mL (04-22-20 @ 07:08)  Ferritin, Serum: 520 ng/mL (04-21-20 @ 16:03)    D-Dimer Assay, Quantitative (04.23.20 @ 16:32)    D-Dimer Assay, Quantitative: 307 ng/mL DDU    RADIOLOGY & ADDITIONAL TESTS:  No additional imaging studies

## 2020-04-24 NOTE — PROGRESS NOTE ADULT - PROBLEM SELECTOR PLAN 2
Noted progressively worsening confusion, difficulty performing activities of daily living over last week. CT head negative. Associated with hyponatremia, and covid with high fevers  -Avoid sedating medications, gabapentin held  - Hyponatremia, f/u renal recs management as above  - Tx covid infection, Tylenol PRN for fevers  - Improved

## 2020-04-24 NOTE — PROGRESS NOTE ADULT - SUBJECTIVE AND OBJECTIVE BOX
Patient seen and examined at bedside as per COVID protocol.  Meds, vitals, labs, imaging reviewed.    Patient sometimes refuses blood draws. No new labs seen today.  Planned for BMP blood draw now.      Hyponatremia improved to 134 on yesterday's labs.    O2 sat 95% on NC         Medications:    acetaminophen   Tablet .. 650 milliGRAM(s) every 6 hours PRN  allopurinol 300 milliGRAM(s) daily  aspirin enteric coated 81 milliGRAM(s) daily  atorvastatin 20 milliGRAM(s) at bedtime  azithromycin   Tablet 250 milliGRAM(s) every 24 hours  cholecalciferol 1000 Unit(s) every 24 hours  dextrose 40% Gel 15 Gram(s) once PRN  dextrose 5%. 1000 milliLiter(s) <Continuous>  dextrose 50% Injectable 12.5 Gram(s) once  dextrose 50% Injectable 25 Gram(s) once  dextrose 50% Injectable 25 Gram(s) once  enoxaparin Injectable 40 milliGRAM(s) every 24 hours  famotidine    Tablet 20 milliGRAM(s) daily  glucagon  Injectable 1 milliGRAM(s) once PRN  hydroxychloroquine     hydroxychloroquine 400 milliGRAM(s) every 24 hours  insulin glargine Injectable (LANTUS) 25 Unit(s) at bedtime  insulin lispro (HumaLOG) corrective regimen sliding scale   four times a day with meals  levothyroxine 137 MICROGram(s) daily  losartan 25 milliGRAM(s) daily  piperacillin/tazobactam IVPB.. 3.375 Gram(s) every 6 hours      Allergies    No Known Allergies    Intolerances        T(C): , Max: 37 (04-24-20 @ 06:07)  T(F): , Max: 98.6 (04-24-20 @ 06:07)  HR: 96 (04-24-20 @ 06:07)  BP: 116/61 (04-24-20 @ 06:07)  BP(mean): --  RR: 20 (04-24-20 @ 12:14)  SpO2: 95% (04-24-20 @ 12:14)  Wt(kg): --        ROS: Unable due to limited contact    Physical exam as per primary team due to COVID protocol      LABS:                        14.1   6.19  )-----------( 297      ( 23 Apr 2020 06:32 )             42.2     04-23    134<L>  |  98  |  22  ----------------------------<  129<H>  4.6   |  23  |  1.66<H>    Ca    9.2      23 Apr 2020 15:47  Phos  1.9     04-23  Mg     1.6     04-23    TPro  6.6  /  Alb  3.3  /  TBili  0.4  /  DBili  x   /  AST  81<H>  /  ALT  59<H>  /  AlkPhos  33<L>  04-23        Urinalysis Basic - ( 23 Apr 2020 16:21 )    Color: Yellow / Appearance: SL Cloudy / SG: >=1.030 / pH: x  Gluc: x / Ketone: NEGATIVE  / Bili: Negative / Urobili: 0.2 E.U./dL   Blood: x / Protein: Trace mg/dL / Nitrite: NEGATIVE   Leuk Esterase: NEGATIVE / RBC: < 5 /HPF / WBC < 5 /HPF   Sq Epi: x / Non Sq Epi: x / Bacteria: Present /HPF      Creatinine, Random Urine: 180 mg/dL (04-23 @ 16:21)  Osmolality, Random Urine: 582 mosmol/kg [100 - 650] (04-23 @ 16:21)        RADIOLOGY & ADDITIONAL STUDIES:

## 2020-04-24 NOTE — PROGRESS NOTE ADULT - ATTENDING COMMENTS
INCOMPLETE    24Hr Events: Received Toci  All ROS neg, refusing blood draw      75M PMH of DM presented for 1 week of fevers. Patient's wife reported fall 1 week ago hitting head and noted increased fatigue/sleepiness. Found to be COVID+, with hyponatremia at [facility]. Admitted to [facility] for management of hyponatremia and COVID pneumonia 24Hr Events: Received Toci  All ROS neg, refusing blood draw  87% on RA, MS improved but still encephalopathic  CXR shows new L-sided infiltrate  75M PMH of DM p/w fever, encephalopathy found to have LIZ, hyponatremia in setting of COVID now c/b new infiltrate  Start Zosyn  Attempt MRSA swab, if unable will need Vanc to cover MRSA  O2  Re-attempt labs to monitor renal fnxn, Na  Consult PT, S+S

## 2020-04-24 NOTE — PROGRESS NOTE ADULT - PROBLEM SELECTOR PLAN 4
Noted Cr at 1.91, unknown baseline but noted improvement to 1.64 with 3L IVF. Possible COVID component to kidney injury as well.   - urine lytes   -Hold home losartan in setting of LIZ  - avoid nephrotoxic agents Noted Cr at 1.91, unknown baseline but noted improvement to 1.64 with 3L IVF. Possible COVID component to kidney injury as well.   -f/u repeat urine lytes   -Hold home losartan in setting of LIZ  - avoid nephrotoxic agents

## 2020-04-24 NOTE — PHYSICAL THERAPY INITIAL EVALUATION ADULT - CRITERIA FOR SKILLED THERAPEUTIC INTERVENTIONS
impairments found/functional limitations in following categories/therapy frequency/anticipated discharge recommendation

## 2020-04-24 NOTE — PHYSICAL THERAPY INITIAL EVALUATION ADULT - PERTINENT HX OF CURRENT PROBLEM, REHAB EVAL
75M PMH of DM presented for 1 week of fevers. Patient's wife reported fall 1 week ago hitting head and noted increased fatigue/sleepiness. Found to be COVID+, with hyponatremia at [facility]. Admitted to [facility] for management of hyponatremia and COVID pneumonia

## 2020-04-24 NOTE — PROGRESS NOTE ADULT - PROBLEM SELECTOR PLAN 3
Noted persistent fevers, chills, no cough, +SOB, COVID+,+ lymphopenia, CRP 11.02, Ferritin 520  -Worsening O2 requirements this morning (4/23) 86-89% on RA, 94% on 4L NC   -Continue to trend inflammatory markers  -Currently on hydroxychloroquine and azithromycin (day 4 of 5)  -Placed on cefpodoxime to cover CAP, will switch to cefpodoxime for 5 days (day 4/5)  - Toci 4/24 Noted persistent fevers, chills, no cough, +SOB, COVID+,+ lymphopenia, CRP 11.02, Ferritin 520  -This AM 87-88% on RA, 93% on 5L NC   -Will repeat cxr in the context of increased O2 requirements  -Wean as tolerated   -Continue to trend inflammatory markers  -Currently on hydroxychloroquine and azithromycin (day 4 of 5)  -Placed on cefpodoxime to cover CAP, will switch to cefpodoxime for 5 days (day 4/5)  - Toci 4/24

## 2020-04-24 NOTE — PHYSICAL THERAPY INITIAL EVALUATION ADULT - ADDITIONAL COMMENTS
Patient not fully cooperative during interview, history obtained from chart - patient lives with spouse in elevator building, independent prior to admission.

## 2020-04-24 NOTE — PROGRESS NOTE ADULT - ASSESSMENT
74 yo M PMH of DM admitted for covid19 related fever, pneumonia.  Nephrology consulted for Hyponatremia overcorrection.      # Hyponatremia   - Na 122 on 4/21 , up to 134 s/p 3L NS at [facility], sOsm 285 s/p hydration  - assuming chronic hypovolemic hypotonic hyponatremia in setting of poor oral intake, diarrhea  - urine Na 67, osm 445,  - Na goals met -  improved to 134 on 4/23  - BMP pending - limited by patient refusal of lab draws  - will monitor         # LIZ  - unknown baseline Cr, on admission 1.9, down to 1.6 w hydration  - likely pre-renal in setting of poor oral intake, dehydration, diarrhea vs ATN 2/2 covid19 infection  - ulytes, urinalysis noted, FeNa cw pre-renal cause  - maintain map >65-70 mmHg  - monitor BUN/Cr, lytes, uop   - send for renal sono/ bladder scan  - avoid ACE/ARB/NSAIDS/Contrast 74 yo M PMH of DM admitted for covid19 related fever, pneumonia.  Nephrology consulted for Hyponatremia overcorrection.      # Hyponatremia   - with initial rapid correction necessitating re lowering of Na  rate of rise of Na monitored and regulated-  Na now 137- at goal      # LIZ  - unknown baseline Cr, on admission 1.9,   creat now stable at 1.66

## 2020-04-25 LAB
B-OH-BUTYR SERPL-SCNC: 0.5 MMOL/L — HIGH
B-OH-BUTYR SERPL-SCNC: 0.9 MMOL/L — HIGH
BASE EXCESS BLDA CALC-SCNC: -2.3 MMOL/L — LOW (ref -2–3)
BASE EXCESS BLDA CALC-SCNC: -2.6 MMOL/L — LOW (ref -2–3)
GAS PNL BLDA: SIGNIFICANT CHANGE UP
GAS PNL BLDA: SIGNIFICANT CHANGE UP
GLUCOSE BLDC GLUCOMTR-MCNC: 152 MG/DL — HIGH (ref 70–99)
GLUCOSE BLDC GLUCOMTR-MCNC: 169 MG/DL — HIGH (ref 70–99)
GLUCOSE BLDC GLUCOMTR-MCNC: 181 MG/DL — HIGH (ref 70–99)
GLUCOSE BLDC GLUCOMTR-MCNC: 186 MG/DL — HIGH (ref 70–99)
HCO3 BLDA-SCNC: 20 MMOL/L — LOW (ref 21–28)
HCO3 BLDA-SCNC: 20 MMOL/L — LOW (ref 21–28)
LACTATE SERPL-SCNC: 1.3 MMOL/L — SIGNIFICANT CHANGE UP (ref 0.5–2)
PCO2 BLDA: 29 MMHG — LOW (ref 35–48)
PCO2 BLDA: 30 MMHG — LOW (ref 35–48)
PH BLDA: 7.45 — SIGNIFICANT CHANGE UP (ref 7.35–7.45)
PH BLDA: 7.46 — HIGH (ref 7.35–7.45)
PO2 BLDA: 74 MMHG — LOW (ref 83–108)
PO2 BLDA: 77 MMHG — LOW (ref 83–108)
SAO2 % BLDA: 95 % — SIGNIFICANT CHANGE UP (ref 95–100)
SAO2 % BLDA: 95 % — SIGNIFICANT CHANGE UP (ref 95–100)

## 2020-04-25 PROCEDURE — 99233 SBSQ HOSP IP/OBS HIGH 50: CPT | Mod: CS

## 2020-04-25 PROCEDURE — 99231 SBSQ HOSP IP/OBS SF/LOW 25: CPT

## 2020-04-25 PROCEDURE — 71045 X-RAY EXAM CHEST 1 VIEW: CPT | Mod: 26

## 2020-04-25 RX ORDER — SODIUM CHLORIDE 9 MG/ML
1000 INJECTION, SOLUTION INTRAVENOUS
Refills: 0 | Status: DISCONTINUED | OUTPATIENT
Start: 2020-04-25 | End: 2020-04-26

## 2020-04-25 RX ORDER — ENOXAPARIN SODIUM 100 MG/ML
40 INJECTION SUBCUTANEOUS EVERY 24 HOURS
Refills: 0 | Status: DISCONTINUED | OUTPATIENT
Start: 2020-04-25 | End: 2020-05-11

## 2020-04-25 RX ORDER — ENOXAPARIN SODIUM 100 MG/ML
80 INJECTION SUBCUTANEOUS EVERY 12 HOURS
Refills: 0 | Status: DISCONTINUED | OUTPATIENT
Start: 2020-04-25 | End: 2020-04-25

## 2020-04-25 RX ADMIN — PIPERACILLIN AND TAZOBACTAM 200 GRAM(S): 4; .5 INJECTION, POWDER, LYOPHILIZED, FOR SOLUTION INTRAVENOUS at 11:02

## 2020-04-25 RX ADMIN — Medication 2: at 09:09

## 2020-04-25 RX ADMIN — ATORVASTATIN CALCIUM 20 MILLIGRAM(S): 80 TABLET, FILM COATED ORAL at 23:11

## 2020-04-25 RX ADMIN — PIPERACILLIN AND TAZOBACTAM 200 GRAM(S): 4; .5 INJECTION, POWDER, LYOPHILIZED, FOR SOLUTION INTRAVENOUS at 22:16

## 2020-04-25 RX ADMIN — Medication 300 MILLIGRAM(S): at 11:01

## 2020-04-25 RX ADMIN — ENOXAPARIN SODIUM 40 MILLIGRAM(S): 100 INJECTION SUBCUTANEOUS at 16:58

## 2020-04-25 RX ADMIN — PIPERACILLIN AND TAZOBACTAM 200 GRAM(S): 4; .5 INJECTION, POWDER, LYOPHILIZED, FOR SOLUTION INTRAVENOUS at 17:00

## 2020-04-25 RX ADMIN — Medication 81 MILLIGRAM(S): at 11:01

## 2020-04-25 RX ADMIN — Medication 40 MILLIGRAM(S): at 13:37

## 2020-04-25 RX ADMIN — AZITHROMYCIN 250 MILLIGRAM(S): 500 TABLET, FILM COATED ORAL at 22:15

## 2020-04-25 RX ADMIN — Medication 2: at 13:13

## 2020-04-25 RX ADMIN — SODIUM CHLORIDE 60 MILLILITER(S): 9 INJECTION, SOLUTION INTRAVENOUS at 22:58

## 2020-04-25 RX ADMIN — PIPERACILLIN AND TAZOBACTAM 200 GRAM(S): 4; .5 INJECTION, POWDER, LYOPHILIZED, FOR SOLUTION INTRAVENOUS at 01:12

## 2020-04-25 RX ADMIN — Medication 400 MILLIGRAM(S): at 22:57

## 2020-04-25 RX ADMIN — INSULIN GLARGINE 25 UNIT(S): 100 INJECTION, SOLUTION SUBCUTANEOUS at 23:38

## 2020-04-25 RX ADMIN — Medication 2: at 16:58

## 2020-04-25 RX ADMIN — Medication 2: at 21:46

## 2020-04-25 RX ADMIN — FAMOTIDINE 20 MILLIGRAM(S): 10 INJECTION INTRAVENOUS at 11:01

## 2020-04-25 NOTE — PROGRESS NOTE ADULT - SUBJECTIVE AND OBJECTIVE BOX
Transfer from 7U [facility] to 5U [facility] COURSE:  75M PMH of DM presented for 1 week of fevers. Patient's wife reported fall 1 week ago hitting head and noted increased fatigue/sleepiness, head CT on admission negative for acute changes. Found to be COVID+, with hyponatremia at [facility], resolved as of 4/25. Admitted to [facility] for management of hyponatremia and COVID pneumonia. Pt was on RA on admission, eventually requiring 2 to 4L NC on 4/24. Pt started on zosyn for HAP coverage on 4/24 given increase in o2 requirement, WBC elevation, and cxr with left midlung consolidation. Pt was on NC satting ~94% until AM of 4/25 where he was tachypnic requiring 15L NRB. ABG on 15L NRB with PO2 77 with sat of 95%. ICU consulted for increased increased wob and worsening respiratory status with increased lethargy.     VITAL SIGNS:  T(F): 98.4 (04-25-20 @ 13:46)  HR: 80 (04-25-20 @ 13:46)  BP: 122/74 (04-25-20 @ 13:46)  RR: 20 (04-25-20 @ 13:46)  SpO2: 94% (04-25-20 @ 13:46)  Wt(kg): --    PHYSICAL EXAM:  As per [facility] note  GENERAL: NAD, well-groomed, well-developed, resting comfortably in bed   HEAD:  Atraumatic, Normocephalic  EYES: EOMI, PERRLA, conjunctiva and sclera clear  ENMT: No tonsillar erythema, exudates, or enlargement; Moist mucous membranes, Good dentition, No lesions  NECK: Supple  CHEST/LUNG: Clear to auscultation bilaterally; No rales, rhonchi, wheezing, or rubs; no respiratory distress, speaking in full sentences 87% on RA--> 95% on 5L  HEART: Regular rate and rhythm; Normal S1S2; No murmurs, rubs, or gallops  ABDOMEN: Soft, Nontender, Nondistended; Bowel sounds present; No masses or HSM   EXTREMITIES:  2+ Peripheral Pulses, No clubbing, cyanosis, or edema  NERVOUS SYSTEM:  AAOX3; Moving all four extremities well; CN II-XII grossly intact   SKIN: No rashes or lesions    MEDICATIONS  (STANDING):  allopurinol 300 milliGRAM(s) Oral daily  aspirin enteric coated 81 milliGRAM(s) Oral daily  atorvastatin 20 milliGRAM(s) Oral at bedtime  azithromycin   Tablet 250 milliGRAM(s) Oral every 24 hours  cholecalciferol 1000 Unit(s) Oral every 24 hours  dextrose 5%. 1000 milliLiter(s) (50 mL/Hr) IV Continuous <Continuous>  dextrose 50% Injectable 12.5 Gram(s) IV Push once  dextrose 50% Injectable 25 Gram(s) IV Push once  dextrose 50% Injectable 25 Gram(s) IV Push once  enoxaparin Injectable 40 milliGRAM(s) SubCutaneous every 24 hours  famotidine    Tablet 20 milliGRAM(s) Oral daily  hydroxychloroquine   Oral   hydroxychloroquine 400 milliGRAM(s) Oral every 24 hours  insulin glargine Injectable (LANTUS) 25 Unit(s) SubCutaneous at bedtime  insulin lispro (HumaLOG) corrective regimen sliding scale   SubCutaneous four times a day with meals  levothyroxine 137 MICROGram(s) Oral daily  losartan 25 milliGRAM(s) Oral daily  piperacillin/tazobactam IVPB.. 3.375 Gram(s) IV Intermittent every 6 hours    MEDICATIONS  (PRN):  acetaminophen   Tablet .. 650 milliGRAM(s) Oral every 6 hours PRN Temp greater or equal to 38C (100.4F)  dextrose 40% Gel 15 Gram(s) Oral once PRN Blood Glucose LESS THAN 70 milliGRAM(s)/deciliter  glucagon  Injectable 1 milliGRAM(s) IntraMuscular once PRN Glucose LESS THAN 70 milligrams/deciliter      Allergies    No Known Allergies    Intolerances        LABS:                        14.8   13.15 )-----------( 271      ( 25 Apr 2020 07:18 )             44.4     04-25    135  |  100  |  26<H>  ----------------------------<  141<H>  4.6   |  19<L>  |  1.74<H>    Ca    9.2      25 Apr 2020 07:18  Phos  2.7     04-25  Mg     1.8     04-25    TPro  6.4  /  Alb  3.0<L>  /  TBili  0.4  /  DBili  x   /  AST  95<H>  /  ALT  93<H>  /  AlkPhos  44  04-25      Urinalysis Basic - ( 23 Apr 2020 16:21 )    Color: Yellow / Appearance: SL Cloudy / SG: >=1.030 / pH: x  Gluc: x / Ketone: NEGATIVE  / Bili: Negative / Urobili: 0.2 E.U./dL   Blood: x / Protein: Trace mg/dL / Nitrite: NEGATIVE   Leuk Esterase: NEGATIVE / RBC: < 5 /HPF / WBC < 5 /HPF   Sq Epi: x / Non Sq Epi: x / Bacteria: Present /HPF        RADIOLOGY & ADDITIONAL TESTS:  Reviewed

## 2020-04-25 NOTE — PROGRESS NOTE ADULT - ATTENDING COMMENTS
hyponatremia with rapid correction, necessitating re-lowering-  now Na low normal 136- okay  still limit free water  CKD, versus LIZ- creat stable at 1.7- drop I CO2  to 19- if dips any lower, add NaHCO3- okay to follow trend today

## 2020-04-25 NOTE — CONSULT NOTE ADULT - SUBJECTIVE AND OBJECTIVE BOX
Critical Care Consultation     Mr. Weeks is a 75M admitted on 4/21 for encephalopathy 2/2 COVID.  Patient received 5 days of treatment with Plaquenil and Azithromycin (4/21-4/25).  Patient was saturating well on room air initially, however, the course of the last2 days had increasing oxygen requirements. He received Toci on 4/23 and was started on Zosyn on 4/24 for CAP given new R sided consolidation on CXR (MRSA swab negative). Today, patient was desaturating on NC and therefore was placed on NRB at 15L/min to maintain O2 sats. He was started on Solumedrol 40mg IVP BID.     ICU consulted, recommendation for telemetry. Case discussed with pulm fellow, Dr. Marcus. Critical Care Consultation     Mr. Weeks is a 75M admitted on 4/21 for encephalopathy 2/2 COVID.  Patient received 5 days of treatment with Plaquenil and Azithromycin (4/21-4/25).  Patient was saturating well on room air initially, however, the course of the last2 days had increasing oxygen requirements. He received Toci on 4/23 and was started on Zosyn on 4/24 for CAP given new R sided consolidation on CXR (MRSA swab negative). Today, patient was desaturating on NC and therefore was placed on NRB at 15L/min to maintain O2 sats. He was started on Solumedrol 40mg IVP BID.     ICU consulted, recommendation for telemetry. Case discussed with pulm fellow, Dr. Marcus.       Addendum: (15:53)  Patient seen and assessed again at bedside in [facility].   Tachypneic in 30's , drowsy but wakes up to command, satting 92 % on NRB.   ABG shows worsening hypoxemia and respiratory alkalosis.  ·	High risk for intubation, would transfer to Negative pressure room for NIV, continue with antibiotics and sterids.   ·	Family and PCP made aware by medicine resident.

## 2020-04-25 NOTE — PROGRESS NOTE ADULT - PROBLEM SELECTOR PLAN 3
Noted persistent fevers, chills, no cough, +SOB, COVID+,+ lymphopenia, CRP 11.02, Ferritin 520  -Will repeat cxr in the context of increased O2 requirements  -Wean as tolerated   -Continue to trend inflammatory markers  -Currently on hydroxychloroquine and azithromycin (day 4 of 5)  -Placed on cefpodoxime to cover CAP, will switch to cefpodoxime for 5 days (day 4/5)  - Toci 4/24

## 2020-04-25 NOTE — PROGRESS NOTE ADULT - ASSESSMENT
75M PMH of DM presented for 1 week of fevers. Patient's wife reported fall 1 week ago hitting head and noted increased fatigue/sleepiness. Found to be COVID+, with hyponatremia at [facility]. Admitted to [facility] for management of hyponatremia and COVID pneumonia. 84% on Room air this morning -> 92% on 10L NRB

## 2020-04-25 NOTE — PROGRESS NOTE ADULT - ASSESSMENT
75M PMH of DM presented for 1 week of fevers. Patient's wife reported fall 1 week ago hitting head and noted increased fatigue/sleepiness. Found to be COVID+, with hyponatremia at [facility]. Admitted to [facility] for management of hyponatremia and COVID pneumonia. 84% on Room air this morning -> 92% on 10L NRB

## 2020-04-25 NOTE — PROGRESS NOTE ADULT - PROBLEM SELECTOR PLAN 1
Noted hyponatremia to 122, on repeat on arrival to [facility] noted to be 134, patient was initially AAOx1-2, now AAOx3 with mental status improving. Legionella and TSH negative.   -Continue following renal recs, fluid restriction   -D5W d/c 4/22. 4/24 Na 134  -holding duloxetine temporarily in setting of hyponatremia

## 2020-04-25 NOTE — PROGRESS NOTE ADULT - PROBLEM SELECTOR PLAN 2
Noted progressively worsening confusion, difficulty performing activities of daily living over last week. CT head negative. Associated with hyponatremia, and covid with high fevers  - Avoid sedating medications, gabapentin held  - Hyponatremia, f/u renal recs management as above  - Tx covid infection, Tylenol PRN for fevers  - Improved

## 2020-04-25 NOTE — PROGRESS NOTE ADULT - ATTENDING COMMENTS
24Hr Events: Requiring NRB this AM, more somnolent.  Pt unable to participate w/ ROS  Afeb, 92% on 12L NRB, tachypneic, somnolent  WBC 13 (increasing), Ddimer/CRP mildly increased, Na 135, Cr 1.74  75yoM w/ h/o DM p/w fever, AMS found to have hyponatremia in setting of COVID s/p Toci, completing Azithro/Plaquenil w/ course c/b increasing hypoxia now on NRB who began tx for HAP w/ Zosyn yesterday and Solumedrol and full AC today  S/p Toci, to complete Azithro/Plaquenil today, start Solumedrol  Maintain O2, ABG c/b resp alkalosis from tachypnea  Increase to full AC and check CTA/VQ, dopplers when stable  Continue Zosyn, MRSA swab neg  Supportive care for encephalopathy  Consult ICU for telemetry

## 2020-04-25 NOTE — PROGRESS NOTE ADULT - PROBLEM SELECTOR PLAN 1
Noted hyponatremia to 122, on repeat on arrival to [facility] noted to be 134, patient was initially AAOx1-2, now AAOx3 with mental status improving. Legionella and TSH negative.   -Continue following renal recs, fluid restriction   -D5W d/c 4/22. 4/24 Na 134  -holding duloxetine temporarily in setting of hyponatremia

## 2020-04-25 NOTE — PROGRESS NOTE ADULT - SUBJECTIVE AND OBJECTIVE BOX
Patient is a 75y Male seen and evaluated at bedside.   pt seen and discussed with team   labs noted    acetaminophen   Tablet .. 650 every 6 hours PRN  allopurinol 300 daily  aspirin enteric coated 81 daily  atorvastatin 20 at bedtime  azithromycin   Tablet 250 every 24 hours  cholecalciferol 1000 every 24 hours  dextrose 40% Gel 15 once PRN  dextrose 5%. 1000 <Continuous>  dextrose 50% Injectable 12.5 once  dextrose 50% Injectable 25 once  dextrose 50% Injectable 25 once  enoxaparin Injectable 80 every 12 hours  famotidine    Tablet 20 daily  glucagon  Injectable 1 once PRN  hydroxychloroquine    hydroxychloroquine 400 every 24 hours  insulin glargine Injectable (LANTUS) 25 at bedtime  insulin lispro (HumaLOG) corrective regimen sliding scale  four times a day with meals  levothyroxine 137 daily  losartan 25 daily  methylPREDNISolone sodium succinate Injectable 40 every 12 hours  piperacillin/tazobactam IVPB.. 3.375 every 6 hours      Allergies    No Known Allergies    Intolerances        T(C): , Max: 36.7 (04-24-20 @ 22:50)  T(F): , Max: 98.1 (04-24-20 @ 22:50)  HR: 76 (04-25-20 @ 06:34)  BP: 128/71 (04-25-20 @ 06:34)  BP(mean): --  RR: 20 (04-25-20 @ 06:34)  SpO2: 91% (04-25-20 @ 06:34)  Wt(kg): --        Review of Systems:  deferred due to COVID 19      PHYSICAL EXAM:  deferred due to covid 19            LABS:                        14.8   13.15 )-----------( 271      ( 25 Apr 2020 07:18 )             44.4     04-25    135  |  100  |  26<H>  ----------------------------<  141<H>  4.6   |  19<L>  |  1.74<H>    Ca    9.2      25 Apr 2020 07:18  Phos  2.7     04-25  Mg     1.8     04-25    TPro  6.4  /  Alb  3.0<L>  /  TBili  0.4  /  DBili  x   /  AST  95<H>  /  ALT  93<H>  /  AlkPhos  44  04-25        Urinalysis Basic - ( 23 Apr 2020 16:21 )    Color: Yellow / Appearance: SL Cloudy / SG: >=1.030 / pH: x  Gluc: x / Ketone: NEGATIVE  / Bili: Negative / Urobili: 0.2 E.U./dL   Blood: x / Protein: Trace mg/dL / Nitrite: NEGATIVE   Leuk Esterase: NEGATIVE / RBC: < 5 /HPF / WBC < 5 /HPF   Sq Epi: x / Non Sq Epi: x / Bacteria: Present /HPF      Sodium, Random Urine: 43 mmol/L (04-24 @ 14:47)  Creatinine, Random Urine: 157 mg/dL (04-24 @ 14:47)  Osmolality, Random Urine: 557 mosmol/kg (04-24 @ 14:45)  Creatinine, Random Urine: 180 mg/dL (04-23 @ 16:21)  Osmolality, Random Urine: 582 mosmol/kg (04-23 @ 16:21)  Sodium, Random Urine: 50 mmol/L (04-23 @ 16:21)        RADIOLOGY & ADDITIONAL STUDIES:

## 2020-04-25 NOTE — PROGRESS NOTE ADULT - PROBLEM SELECTOR PLAN 3
Noted persistent fevers, chills, no cough, +SOB, COVID+,+ lymphopenia, CRP 11.02, Ferritin 520  - CXR 4/25 unchanged from prior day  - Wean as tolerated   - Continue to trend inflammatory markers  - Currently on hydroxychloroquine and azithromycin (day 4 of 5)  - Placed on cefpodoxime to cover CAP, will switch to zosyn for 5 days (day 4/5)  - Toci 4/24

## 2020-04-25 NOTE — PROGRESS NOTE ADULT - SUBJECTIVE AND OBJECTIVE BOX
Medicine Progress Note    Patient is a 75y old  Male who presents with a chief complaint of AMS (24 Apr 2020 08:35)      SUBJECTIVE / OVERNIGHT EVENTS: Pt seen and examined bedside. Patient without nasal cannula @ 85%. Place NC @ 6L -> 88% -> placed NRB @ 10L -> 92%.    ADDITIONAL REVIEW OF SYSTEMS:    MEDICATIONS  (STANDING):  allopurinol 300 milliGRAM(s) Oral daily  aspirin enteric coated 81 milliGRAM(s) Oral daily  atorvastatin 20 milliGRAM(s) Oral at bedtime  azithromycin   Tablet 250 milliGRAM(s) Oral every 24 hours  cholecalciferol 1000 Unit(s) Oral every 24 hours  dextrose 5%. 1000 milliLiter(s) (50 mL/Hr) IV Continuous <Continuous>  dextrose 50% Injectable 12.5 Gram(s) IV Push once  dextrose 50% Injectable 25 Gram(s) IV Push once  dextrose 50% Injectable 25 Gram(s) IV Push once  enoxaparin Injectable 40 milliGRAM(s) SubCutaneous every 24 hours  famotidine    Tablet 20 milliGRAM(s) Oral daily  hydroxychloroquine   Oral   hydroxychloroquine 400 milliGRAM(s) Oral every 24 hours  insulin glargine Injectable (LANTUS) 25 Unit(s) SubCutaneous at bedtime  insulin lispro (HumaLOG) corrective regimen sliding scale   SubCutaneous four times a day with meals  levothyroxine 137 MICROGram(s) Oral daily  losartan 25 milliGRAM(s) Oral daily  piperacillin/tazobactam IVPB.. 3.375 Gram(s) IV Intermittent every 6 hours    MEDICATIONS  (PRN):  acetaminophen   Tablet .. 650 milliGRAM(s) Oral every 6 hours PRN Temp greater or equal to 38C (100.4F)  dextrose 40% Gel 15 Gram(s) Oral once PRN Blood Glucose LESS THAN 70 milliGRAM(s)/deciliter  glucagon  Injectable 1 milliGRAM(s) IntraMuscular once PRN Glucose LESS THAN 70 milligrams/deciliter    CAPILLARY BLOOD GLUCOSE      POCT Blood Glucose.: 152 mg/dL (25 Apr 2020 08:18)  POCT Blood Glucose.: 136 mg/dL (24 Apr 2020 21:46)  POCT Blood Glucose.: 127 mg/dL (24 Apr 2020 17:10)  POCT Blood Glucose.: 95 mg/dL (24 Apr 2020 12:19)  POCT Blood Glucose.: 88 mg/dL (24 Apr 2020 10:26)    I&O's Summary      PHYSICAL EXAM:  Vital Signs Last 24 Hrs  T(C): 36.7 (25 Apr 2020 06:34), Max: 36.7 (24 Apr 2020 22:50)  T(F): 98 (25 Apr 2020 06:34), Max: 98.1 (24 Apr 2020 22:50)  HR: 76 (25 Apr 2020 06:34) (69 - 78)  BP: 128/71 (25 Apr 2020 06:34) (116/66 - 128/71)  BP(mean): --  RR: 20 (25 Apr 2020 06:34) (19 - 22)  SpO2: 91% (25 Apr 2020 06:34) (91% - 95%)    PHYSICAL EXAM:  GENERAL: NAD, well-groomed, well-developed, resting comfortably in bed   HEAD:  Atraumatic, Normocephalic  EYES: EOMI, PERRLA, conjunctiva and sclera clear  ENMT: No tonsillar erythema, exudates, or enlargement; Moist mucous membranes, Good dentition, No lesions  NECK: Supple  CHEST/LUNG: Clear to auscultation bilaterally; No rales, rhonchi, wheezing, or rubs; no respiratory distress, speaking in full sentences 87% on RA--> 95% on 5L  HEART: Regular rate and rhythm; Normal S1S2; No murmurs, rubs, or gallops  ABDOMEN: Soft, Nontender, Nondistended; Bowel sounds present; No masses or HSM   EXTREMITIES:  2+ Peripheral Pulses, No clubbing, cyanosis, or edema  NERVOUS SYSTEM:  AAOX3; Moving all four extremities well; CN II-XII grossly intact   SKIN: No rashes or lesions    LABS:                        14.8   13.15 )-----------( 271      ( 25 Apr 2020 07:18 )             44.4     04-25    135  |  100  |  26<H>  ----------------------------<  141<H>  4.6   |  19<L>  |  1.74<H>    Ca    9.2      25 Apr 2020 07:18  Phos  2.7     04-25  Mg     1.8     04-25    TPro  6.4  /  Alb  3.0<L>  /  TBili  0.4  /  DBili  x   /  AST  95<H>  /  ALT  93<H>  /  AlkPhos  44  04-25          Urinalysis Basic - ( 23 Apr 2020 16:21 )    Color: Yellow / Appearance: SL Cloudy / SG: >=1.030 / pH: x  Gluc: x / Ketone: NEGATIVE  / Bili: Negative / Urobili: 0.2 E.U./dL   Blood: x / Protein: Trace mg/dL / Nitrite: NEGATIVE   Leuk Esterase: NEGATIVE / RBC: < 5 /HPF / WBC < 5 /HPF   Sq Epi: x / Non Sq Epi: x / Bacteria: Present /HPF        COVID-19 PCR: Detected (21 Apr 2020 11:15)      RADIOLOGY & ADDITIONAL TESTS:  Imaging from Last 24 Hours:    Electrocardiogram/QTc Interval:    COORDINATION OF CARE:  Care Discussed with Consultants/Other Providers: HOSPITAL COURSE:  75M PMH of DM presented for 1 week of fevers. Patient's wife reported fall 1 week ago hitting head and noted increased fatigue/sleepiness, head CT on admission negative for acute changes. Found to be COVID+, with hyponatremia at [facility], resolved as of 4/25. Admitted to [facility] for management of hyponatremia and COVID pneumonia. Pt was on RA on admission, eventually requiring 2 to 4L NC on 4/24. Pt started on zosyn for HAP coverage on 4/24 given increase in o2 requirement, WBC elevation, and cxr with left midlung consolidation. Pt was on NC satting ~94% until AM of 4/25 where he was tachypnic requiring 15L NRB. ABG on 15L NRB with PO2 77 with sat of 95%. ICU consulted for increased increased wob and worsening respiratory status with increased lethargy.     Medicine Progress Note    Patient is a 75y old  Male who presents with a chief complaint of AMS (24 Apr 2020 08:35)      SUBJECTIVE / OVERNIGHT EVENTS: Pt seen and examined bedside. Patient without nasal cannula @ 85%. Place NC @ 6L -> 88% -> placed NRB @ 10L -> 92%.    ADDITIONAL REVIEW OF SYSTEMS:    MEDICATIONS  (STANDING):  allopurinol 300 milliGRAM(s) Oral daily  aspirin enteric coated 81 milliGRAM(s) Oral daily  atorvastatin 20 milliGRAM(s) Oral at bedtime  azithromycin   Tablet 250 milliGRAM(s) Oral every 24 hours  cholecalciferol 1000 Unit(s) Oral every 24 hours  dextrose 5%. 1000 milliLiter(s) (50 mL/Hr) IV Continuous <Continuous>  dextrose 50% Injectable 12.5 Gram(s) IV Push once  dextrose 50% Injectable 25 Gram(s) IV Push once  dextrose 50% Injectable 25 Gram(s) IV Push once  enoxaparin Injectable 40 milliGRAM(s) SubCutaneous every 24 hours  famotidine    Tablet 20 milliGRAM(s) Oral daily  hydroxychloroquine   Oral   hydroxychloroquine 400 milliGRAM(s) Oral every 24 hours  insulin glargine Injectable (LANTUS) 25 Unit(s) SubCutaneous at bedtime  insulin lispro (HumaLOG) corrective regimen sliding scale   SubCutaneous four times a day with meals  levothyroxine 137 MICROGram(s) Oral daily  losartan 25 milliGRAM(s) Oral daily  piperacillin/tazobactam IVPB.. 3.375 Gram(s) IV Intermittent every 6 hours    MEDICATIONS  (PRN):  acetaminophen   Tablet .. 650 milliGRAM(s) Oral every 6 hours PRN Temp greater or equal to 38C (100.4F)  dextrose 40% Gel 15 Gram(s) Oral once PRN Blood Glucose LESS THAN 70 milliGRAM(s)/deciliter  glucagon  Injectable 1 milliGRAM(s) IntraMuscular once PRN Glucose LESS THAN 70 milligrams/deciliter    CAPILLARY BLOOD GLUCOSE      POCT Blood Glucose.: 152 mg/dL (25 Apr 2020 08:18)  POCT Blood Glucose.: 136 mg/dL (24 Apr 2020 21:46)  POCT Blood Glucose.: 127 mg/dL (24 Apr 2020 17:10)  POCT Blood Glucose.: 95 mg/dL (24 Apr 2020 12:19)  POCT Blood Glucose.: 88 mg/dL (24 Apr 2020 10:26)    I&O's Summary      PHYSICAL EXAM:  Vital Signs Last 24 Hrs  T(C): 36.7 (25 Apr 2020 06:34), Max: 36.7 (24 Apr 2020 22:50)  T(F): 98 (25 Apr 2020 06:34), Max: 98.1 (24 Apr 2020 22:50)  HR: 76 (25 Apr 2020 06:34) (69 - 78)  BP: 128/71 (25 Apr 2020 06:34) (116/66 - 128/71)  BP(mean): --  RR: 20 (25 Apr 2020 06:34) (19 - 22)  SpO2: 91% (25 Apr 2020 06:34) (91% - 95%)    PHYSICAL EXAM:  GENERAL: NAD, well-groomed, well-developed, resting comfortably in bed   HEAD:  Atraumatic, Normocephalic  EYES: EOMI, PERRLA, conjunctiva and sclera clear  ENMT: No tonsillar erythema, exudates, or enlargement; Moist mucous membranes, Good dentition, No lesions  NECK: Supple  CHEST/LUNG: Clear to auscultation bilaterally; No rales, rhonchi, wheezing, or rubs; no respiratory distress, speaking in full sentences 87% on RA--> 95% on 5L  HEART: Regular rate and rhythm; Normal S1S2; No murmurs, rubs, or gallops  ABDOMEN: Soft, Nontender, Nondistended; Bowel sounds present; No masses or HSM   EXTREMITIES:  2+ Peripheral Pulses, No clubbing, cyanosis, or edema  NERVOUS SYSTEM:  AAOX3; Moving all four extremities well; CN II-XII grossly intact   SKIN: No rashes or lesions    LABS:                        14.8   13.15 )-----------( 271      ( 25 Apr 2020 07:18 )             44.4     04-25    135  |  100  |  26<H>  ----------------------------<  141<H>  4.6   |  19<L>  |  1.74<H>    Ca    9.2      25 Apr 2020 07:18  Phos  2.7     04-25  Mg     1.8     04-25    TPro  6.4  /  Alb  3.0<L>  /  TBili  0.4  /  DBili  x   /  AST  95<H>  /  ALT  93<H>  /  AlkPhos  44  04-25          Urinalysis Basic - ( 23 Apr 2020 16:21 )    Color: Yellow / Appearance: SL Cloudy / SG: >=1.030 / pH: x  Gluc: x / Ketone: NEGATIVE  / Bili: Negative / Urobili: 0.2 E.U./dL   Blood: x / Protein: Trace mg/dL / Nitrite: NEGATIVE   Leuk Esterase: NEGATIVE / RBC: < 5 /HPF / WBC < 5 /HPF   Sq Epi: x / Non Sq Epi: x / Bacteria: Present /HPF        COVID-19 PCR: Detected (21 Apr 2020 11:15)      RADIOLOGY & ADDITIONAL TESTS:  Imaging from Last 24 Hours:    Electrocardiogram/QTc Interval:    COORDINATION OF CARE:  Care Discussed with Consultants/Other Providers:

## 2020-04-25 NOTE — PROGRESS NOTE ADULT - ASSESSMENT
76 yo M PMH of DM admitted for covid19 related fever, pneumonia.  Nephrology consulted for Hyponatremia overcorrection.      # Hyponatremia   na @ 122 on admission,   NA now within normal range      # LIZ  - unknown baseline Cr, on admission 1.9,   cr remains in mid 1s during this hospitalization  renal diet  recommend renal us to assess kidneys echogenicity and size      acidosis  likely 2nd to ckd  recommend po bicarbonate to keep bicarbonate > 22  start with sodium bicarbonate 650 mg po tid     renal bone disease  check pth, vit D 25  phos noted

## 2020-04-25 NOTE — PROGRESS NOTE ADULT - PROBLEM SELECTOR PLAN 4
Noted Cr at 1.91, unknown baseline but noted improvement to 1.64 with 3L IVF. Possible COVID component to kidney injury as well.   - f/u repeat urine lytes   - Hold home losartan in setting of LIZ  - avoid nephrotoxic agents

## 2020-04-26 LAB
ALBUMIN SERPL ELPH-MCNC: 3.3 G/DL — SIGNIFICANT CHANGE UP (ref 3.3–5)
ALP SERPL-CCNC: 62 U/L — SIGNIFICANT CHANGE UP (ref 40–120)
ALT FLD-CCNC: 81 U/L — HIGH (ref 10–45)
ANION GAP SERPL CALC-SCNC: 15 MMOL/L — SIGNIFICANT CHANGE UP (ref 5–17)
AST SERPL-CCNC: 67 U/L — HIGH (ref 10–40)
B-OH-BUTYR SERPL-SCNC: 0.3 MMOL/L — SIGNIFICANT CHANGE UP
BASOPHILS # BLD AUTO: 0 K/UL — SIGNIFICANT CHANGE UP (ref 0–0.2)
BASOPHILS NFR BLD AUTO: 0 % — SIGNIFICANT CHANGE UP (ref 0–2)
BILIRUB SERPL-MCNC: 0.3 MG/DL — SIGNIFICANT CHANGE UP (ref 0.2–1.2)
BUN SERPL-MCNC: 35 MG/DL — HIGH (ref 7–23)
CALCIUM SERPL-MCNC: 9.4 MG/DL — SIGNIFICANT CHANGE UP (ref 8.4–10.5)
CHLORIDE SERPL-SCNC: 99 MMOL/L — SIGNIFICANT CHANGE UP (ref 96–108)
CK SERPL-CCNC: 53 U/L — SIGNIFICANT CHANGE UP (ref 30–200)
CO2 SERPL-SCNC: 21 MMOL/L — LOW (ref 22–31)
CREAT SERPL-MCNC: 1.72 MG/DL — HIGH (ref 0.5–1.3)
CRP SERPL-MCNC: 8.72 MG/DL — HIGH (ref 0–0.4)
CULTURE RESULTS: SIGNIFICANT CHANGE UP
CULTURE RESULTS: SIGNIFICANT CHANGE UP
D DIMER BLD IA.RAPID-MCNC: 594 NG/ML DDU — HIGH
EOSINOPHIL # BLD AUTO: 0 K/UL — SIGNIFICANT CHANGE UP (ref 0–0.5)
EOSINOPHIL NFR BLD AUTO: 0 % — SIGNIFICANT CHANGE UP (ref 0–6)
FERRITIN SERPL-MCNC: 1104 NG/ML — HIGH (ref 30–400)
GLUCOSE BLDC GLUCOMTR-MCNC: 137 MG/DL — HIGH (ref 70–99)
GLUCOSE BLDC GLUCOMTR-MCNC: 179 MG/DL — HIGH (ref 70–99)
GLUCOSE BLDC GLUCOMTR-MCNC: 191 MG/DL — HIGH (ref 70–99)
GLUCOSE BLDC GLUCOMTR-MCNC: 219 MG/DL — HIGH (ref 70–99)
GLUCOSE SERPL-MCNC: 207 MG/DL — HIGH (ref 70–99)
HCT VFR BLD CALC: 42.8 % — SIGNIFICANT CHANGE UP (ref 39–50)
HGB BLD-MCNC: 14.4 G/DL — SIGNIFICANT CHANGE UP (ref 13–17)
LYMPHOCYTES # BLD AUTO: 0.44 K/UL — LOW (ref 1–3.3)
LYMPHOCYTES # BLD AUTO: 2.6 % — LOW (ref 13–44)
MAGNESIUM SERPL-MCNC: 2.1 MG/DL — SIGNIFICANT CHANGE UP (ref 1.6–2.6)
MCHC RBC-ENTMCNC: 31.6 PG — SIGNIFICANT CHANGE UP (ref 27–34)
MCHC RBC-ENTMCNC: 33.6 GM/DL — SIGNIFICANT CHANGE UP (ref 32–36)
MCV RBC AUTO: 93.9 FL — SIGNIFICANT CHANGE UP (ref 80–100)
MONOCYTES # BLD AUTO: 0.73 K/UL — SIGNIFICANT CHANGE UP (ref 0–0.9)
MONOCYTES NFR BLD AUTO: 4.3 % — SIGNIFICANT CHANGE UP (ref 2–14)
NEUTROPHILS # BLD AUTO: 15.66 K/UL — HIGH (ref 1.8–7.4)
NEUTROPHILS NFR BLD AUTO: 91.3 % — HIGH (ref 43–77)
PHOSPHATE SERPL-MCNC: 3.8 MG/DL — SIGNIFICANT CHANGE UP (ref 2.5–4.5)
PLATELET # BLD AUTO: 299 K/UL — SIGNIFICANT CHANGE UP (ref 150–400)
POTASSIUM SERPL-MCNC: 4.7 MMOL/L — SIGNIFICANT CHANGE UP (ref 3.5–5.3)
POTASSIUM SERPL-SCNC: 4.7 MMOL/L — SIGNIFICANT CHANGE UP (ref 3.5–5.3)
PROCALCITONIN SERPL-MCNC: 0.1 NG/ML — SIGNIFICANT CHANGE UP (ref 0.02–0.1)
PROT SERPL-MCNC: 6.8 G/DL — SIGNIFICANT CHANGE UP (ref 6–8.3)
RBC # BLD: 4.56 M/UL — SIGNIFICANT CHANGE UP (ref 4.2–5.8)
RBC # FLD: 15.1 % — HIGH (ref 10.3–14.5)
SODIUM SERPL-SCNC: 135 MMOL/L — SIGNIFICANT CHANGE UP (ref 135–145)
SPECIMEN SOURCE: SIGNIFICANT CHANGE UP
SPECIMEN SOURCE: SIGNIFICANT CHANGE UP
WBC # BLD: 16.99 K/UL — HIGH (ref 3.8–10.5)
WBC # FLD AUTO: 16.99 K/UL — HIGH (ref 3.8–10.5)

## 2020-04-26 PROCEDURE — 99223 1ST HOSP IP/OBS HIGH 75: CPT

## 2020-04-26 PROCEDURE — 71045 X-RAY EXAM CHEST 1 VIEW: CPT | Mod: 26

## 2020-04-26 PROCEDURE — 76775 US EXAM ABDO BACK WALL LIM: CPT | Mod: 26

## 2020-04-26 RX ORDER — INSULIN GLARGINE 100 [IU]/ML
18 INJECTION, SOLUTION SUBCUTANEOUS AT BEDTIME
Refills: 0 | Status: DISCONTINUED | OUTPATIENT
Start: 2020-04-26 | End: 2020-05-09

## 2020-04-26 RX ORDER — SODIUM BICARBONATE 1 MEQ/ML
650 SYRINGE (ML) INTRAVENOUS THREE TIMES A DAY
Refills: 0 | Status: DISCONTINUED | OUTPATIENT
Start: 2020-04-26 | End: 2020-05-02

## 2020-04-26 RX ADMIN — Medication 0: at 21:55

## 2020-04-26 RX ADMIN — PIPERACILLIN AND TAZOBACTAM 200 GRAM(S): 4; .5 INJECTION, POWDER, LYOPHILIZED, FOR SOLUTION INTRAVENOUS at 11:44

## 2020-04-26 RX ADMIN — Medication 81 MILLIGRAM(S): at 11:45

## 2020-04-26 RX ADMIN — Medication 2: at 12:09

## 2020-04-26 RX ADMIN — Medication 650 MILLIGRAM(S): at 22:09

## 2020-04-26 RX ADMIN — PIPERACILLIN AND TAZOBACTAM 200 GRAM(S): 4; .5 INJECTION, POWDER, LYOPHILIZED, FOR SOLUTION INTRAVENOUS at 17:17

## 2020-04-26 RX ADMIN — Medication 4: at 06:38

## 2020-04-26 RX ADMIN — LOSARTAN POTASSIUM 25 MILLIGRAM(S): 100 TABLET, FILM COATED ORAL at 06:13

## 2020-04-26 RX ADMIN — ATORVASTATIN CALCIUM 20 MILLIGRAM(S): 80 TABLET, FILM COATED ORAL at 22:09

## 2020-04-26 RX ADMIN — Medication 300 MILLIGRAM(S): at 11:45

## 2020-04-26 RX ADMIN — PIPERACILLIN AND TAZOBACTAM 200 GRAM(S): 4; .5 INJECTION, POWDER, LYOPHILIZED, FOR SOLUTION INTRAVENOUS at 06:14

## 2020-04-26 RX ADMIN — INSULIN GLARGINE 18 UNIT(S): 100 INJECTION, SOLUTION SUBCUTANEOUS at 22:09

## 2020-04-26 RX ADMIN — Medication 1000 UNIT(S): at 17:17

## 2020-04-26 RX ADMIN — Medication 137 MICROGRAM(S): at 06:13

## 2020-04-26 RX ADMIN — ENOXAPARIN SODIUM 40 MILLIGRAM(S): 100 INJECTION SUBCUTANEOUS at 17:18

## 2020-04-26 RX ADMIN — FAMOTIDINE 20 MILLIGRAM(S): 10 INJECTION INTRAVENOUS at 11:45

## 2020-04-26 NOTE — PROGRESS NOTE ADULT - PROBLEM SELECTOR PLAN 7
Hx of HTN on losartan and atenolol  -Hold losartan  -c/w atenolol with holding parameters Hx of HTN on losartan and atenolol  -losartan

## 2020-04-26 NOTE — CONSULT NOTE ADULT - ASSESSMENT
75M PMH of DM presented for 1 week of fevers. Patient's wife reported fall 1 week ago hitting head and noted increased fatigue/sleepiness. Found to be COVID+, with hyponatremia at [facility]. Admitted to [facility] for management of hyponatremia and COVID pneumonia.      Neuro consulted for agitation/AMS, suspect non-specific encephalopathy multifactorial (ie hypoxia, hyponatremia, systemic infection), CTH on admission with no acute findings. Currently MS improving as patient oriented x 3.    - treat underlying metabolic derangements   - Avoid sedating medications  - Zyprexa 2.5mg IV PRN for severe agitation  - Will consider MRI brain and EEG if recurs

## 2020-04-26 NOTE — PROGRESS NOTE ADULT - SUBJECTIVE AND OBJECTIVE BOX
Patient is a 75y Male seen and evaluated at bedside.   pt seen and discussed with team      acetaminophen   Tablet .. 650 every 6 hours PRN  allopurinol 300 daily  aspirin enteric coated 81 daily  atorvastatin 20 at bedtime  cholecalciferol 1000 every 24 hours  dextrose 40% Gel 15 once PRN  dextrose 5% + sodium chloride 0.45%. 1000 <Continuous>  dextrose 5%. 1000 <Continuous>  dextrose 50% Injectable 12.5 once  dextrose 50% Injectable 25 once  dextrose 50% Injectable 25 once  enoxaparin Injectable 40 every 24 hours  famotidine    Tablet 20 daily  glucagon  Injectable 1 once PRN  insulin glargine Injectable (LANTUS) 18 at bedtime  insulin lispro (HumaLOG) corrective regimen sliding scale  four times a day with meals  levothyroxine 137 daily  losartan 25 daily  piperacillin/tazobactam IVPB.. 3.375 every 6 hours      Allergies    No Known Allergies    Intolerances        T(C): , Max: 36.9 (04-25-20 @ 13:46)  T(F): , Max: 98.5 (04-26-20 @ 05:19)  HR: 66 (04-26-20 @ 08:25)  BP: 105/58 (04-26-20 @ 08:25)  BP(mean): 76 (04-26-20 @ 08:25)  RR: 20 (04-26-20 @ 06:20)  SpO2: 93% (04-26-20 @ 08:25)  Wt(kg): --    04-25 @ 07:01  -  04-26 @ 07:00  --------------------------------------------------------  IN: 580 mL / OUT: 700 mL / NET: -120 mL    04-26 @ 07:01  -  04-26 @ 10:57  --------------------------------------------------------  IN: 240 mL / OUT: 0 mL / NET: 240 mL          Review of Systems:  deferred due to COVID 19      PHYSICAL EXAM:  deferred due to COVID 19            LABS:                        14.4   16.99 )-----------( 299      ( 26 Apr 2020 07:09 )             42.8     04-26    135  |  99  |  35<H>  ----------------------------<  207<H>  4.7   |  21<L>  |  1.72<H>    Ca    9.4      26 Apr 2020 07:09  Phos  3.8     04-26  Mg     2.1     04-26    TPro  6.8  /  Alb  3.3  /  TBili  0.3  /  DBili  x   /  AST  67<H>  /  ALT  81<H>  /  AlkPhos  62  04-26          Sodium, Random Urine: 43 mmol/L (04-24 @ 14:47)  Creatinine, Random Urine: 157 mg/dL (04-24 @ 14:47)  Osmolality, Random Urine: 557 mosmol/kg (04-24 @ 14:45)        RADIOLOGY & ADDITIONAL STUDIES:

## 2020-04-26 NOTE — PROGRESS NOTE ADULT - PROBLEM SELECTOR PLAN 4
Noted Cr at 1.91, unknown baseline but noted improvement to 1.64 with 3L IVF. Possible COVID component to kidney injury as well.   - f/u repeat urine lytes - prerenal  - Hold home losartan in setting of LIZ  - avoid nephrotoxic agents

## 2020-04-26 NOTE — CONSULT NOTE ADULT - SUBJECTIVE AND OBJECTIVE BOX
Neurology Stroke Consult Note  INCOMPLETE NOTE       HPI:  75M PMH of DM presented for 1 week of fevers. Patient's wife reported fall 1 week ago hitting head and noted increased fatigue/sleepiness, head CT on admission negative for acute changes. Found to be COVID+, with hyponatremia at [facility], resolved as of 4/25. Admitted to [facility] for management of hyponatremia and COVID pneumonia. Pt was on room air  on admission, however eventually required 2 to 4L NC on 4/24. Pt started on zosyn for HAP coverage on 4/24 given increase in o2 requirement, WBC elevation, and cxr with left midlung consolidation. Pt was on NC satting ~94% until AM of 4/25 where he was tachypnic requiring 15L NRB. ABG on 15L NRB with PO2 77 with sat of 95% and started on Solumedrol 40mg IVP BID. ICU consulted for increased work of breathing and worsening respiratory status with increased lethargy and recommended telemetry.     Neurology was consulted for COVID agitation on 4/25.      PAST MEDICAL & SURGICAL HISTORY:  Type 2 diabetes mellitus      FAMILY HISTORY:      SOCIAL HISTORY:  Denies smoking, drinking, or drug use    ROS:  Constitutional: No fever, weight loss or fatigue  Eyes: No eye pain, visual disturbances, or discharge  ENMT:  No difficulty hearing, tinnitus, vertigo;  No sinus or throat pain  Neck: No pain or stiffness  Respiratory: No cough, wheezing, chills or hemoptysis  Cardiovascular: No chest pain, palpitations, shortness of breath, dizziness or leg swelling  Gastrointestinal: No abdominal pain. No nausea, vomiting or hematemesis; No diarrhea or constipation. Nohematochezia.  Genitourinary: No dysuria, frequency, hematuria or incontinence  Neurological: As per HPI  Skin: No itching, burning, rashes or lesions   Endocrine: No heat or cold intolerance; No hair loss  Musculoskeletal: No joint pain or swelling; No muscle, back or extremity pain  Psychiatric: No depression, anxiety, mood swings or difficulty sleeping  Heme/Lymph: No easy bruising or bleeding gums    MEDICATIONS  (STANDING):  allopurinol 300 milliGRAM(s) Oral daily  aspirin enteric coated 81 milliGRAM(s) Oral daily  atorvastatin 20 milliGRAM(s) Oral at bedtime  cholecalciferol 1000 Unit(s) Oral every 24 hours  dextrose 5% + sodium chloride 0.45%. 1000 milliLiter(s) (60 mL/Hr) IV Continuous <Continuous>  dextrose 5%. 1000 milliLiter(s) (50 mL/Hr) IV Continuous <Continuous>  dextrose 50% Injectable 12.5 Gram(s) IV Push once  dextrose 50% Injectable 25 Gram(s) IV Push once  dextrose 50% Injectable 25 Gram(s) IV Push once  enoxaparin Injectable 40 milliGRAM(s) SubCutaneous every 24 hours  famotidine    Tablet 20 milliGRAM(s) Oral daily  insulin glargine Injectable (LANTUS) 25 Unit(s) SubCutaneous at bedtime  insulin lispro (HumaLOG) corrective regimen sliding scale   SubCutaneous four times a day with meals  levothyroxine 137 MICROGram(s) Oral daily  losartan 25 milliGRAM(s) Oral daily  piperacillin/tazobactam IVPB.. 3.375 Gram(s) IV Intermittent every 6 hours    MEDICATIONS  (PRN):  acetaminophen   Tablet .. 650 milliGRAM(s) Oral every 6 hours PRN Temp greater or equal to 38C (100.4F)  dextrose 40% Gel 15 Gram(s) Oral once PRN Blood Glucose LESS THAN 70 milliGRAM(s)/deciliter  glucagon  Injectable 1 milliGRAM(s) IntraMuscular once PRN Glucose LESS THAN 70 milligrams/deciliter      Allergies    No Known Allergies    Intolerances        Vital Signs Last 24 Hrs  T(C): 36.2 (25 Apr 2020 19:14), Max: 36.9 (25 Apr 2020 13:46)  T(F): 97.1 (25 Apr 2020 19:14), Max: 98.4 (25 Apr 2020 13:46)  HR: 68 (25 Apr 2020 23:05) (68 - 80)  BP: 112/59 (25 Apr 2020 23:05) (110/57 - 128/71)  BP(mean): 81 (25 Apr 2020 23:05) (81 - 81)  RR: 20 (25 Apr 2020 23:05) (20 - 22)  SpO2: 91% (25 Apr 2020 23:05) (91% - 94%)      INCOMPLETE EXAM   Physical exam:  General: No acute distress, awake and alert  Cardiovascular: Regular rate and rhythm, no murmurs, rubs, or gallops. No bruits  Pulmonary: Anterior breath sounds clear bilaterally, no crackles or wheezing. No use of accessory muscles  Extremities: Radial and DP pulses +2, no edema    Neurologic:  -Mental status: Awake, alert, oriented to person, place, and time. Speech is fluent with intact naming, repetition, and comprehension, no dysarthria. Recent and remote memory intact. Follows commands. Attention/concentration intact. Fund of knowledge appropriate.  -Cranial nerves:   II: Visual fields are full to confrontation.  III, IV, VI: Extraocular movements are intact without nystagmus. Pupils equally round and reactive to light  V:  Facial sensation V1-V3 equal and intact   VII: Face is symmetric with normal eye closure and smile  VIII: Hearing is bilaterally intact to finger rub  IX, X: Uvula is midline and soft palate rises symmetrically  XI: Head turning and shoulder shrug are intact.  XII: Tongue protrudes midline  Motor: Normal bulk and tone. No pronator drift. Strength bilateral upper extremity 5/5, bilateral lower extremities 5/5.  Rapid alternating movements intact and symmetric  Sensation: Intact to light touch bilaterally. No neglect or extinction on double simultaneous testing.  Coordination: No dysmetria on finger-to-nose and heel-to-shin bilaterally  Reflexes: Downgoing toes bilaterally   Gait: Narrow gait and steady        LABS:                        14.8   13.15 )-----------( 271      ( 25 Apr 2020 07:18 )             44.4     04-25    135  |  100  |  26<H>  ----------------------------<  141<H>  4.6   |  19<L>  |  1.74<H>    Ca    9.2      25 Apr 2020 07:18  Phos  2.7     04-25  Mg     1.8     04-25    TPro  6.4  /  Alb  3.0<L>  /  TBili  0.4  /  DBili  x   /  AST  95<H>  /  ALT  93<H>  /  AlkPhos  44  04-25    COVID Labs:   Ferritin, Serum: 937 ng/mL (04.25.20 @ 07:18)    D-Dimer Assay, Quantitative: 533 ng/mL DDU (04.25.20 @ 07:18)    C-Reactive Protein, Serum: 10.12 mg/dL (04.25.20 @ 07:18)    COVID course:  Symptom onset: 4/14  Admission date: 4/21      RADIOLOGY & ADDITIONAL TESTS:  CT Head No Cont (04.21.20 @ 11:09) >    There is no evidence of acute intracranial hemorrhage or acute transcortical infarct. There is enlargement the sulci, cisterns and ventricles consistent with mild cerebral and cerebellar volume loss. There is no evidence of mass-effect or midline shift. There is no evidence of an intra or extra-axial fluid collection.    Visualized paranasal sinuses and bilateral mastoid air cells are clear.    Impression: No acute intracranial injury.      Assessment and Plan  75y Male Neurology Consult Note      HPI:  75M PMH of DM presented for 1 week of fevers. Patient's wife reported fall 1 week ago hitting head and noted increased fatigue/sleepiness, head CT on admission negative for acute changes. Found to be COVID+, with hyponatremia at [facility], resolved as of 4/25. Admitted to [facility] for management of hyponatremia and COVID pneumonia. Pt was on room air  on admission, however eventually required 2 to 4L NC on 4/24. Pt started on zosyn for HAP coverage on 4/24 given increase in o2 requirement, WBC elevation, and cxr with left midlung consolidation. Pt was on NC satting ~94% until AM of 4/25 where he was tachypnic requiring 15L NRB. ABG on 15L NRB with PO2 77 with sat of 95% and started on Solumedrol 40mg IVP BID. ICU consulted for increased work of breathing and worsening respiratory status with increased lethargy and recommended telemetry.     Neurology was consulted for COVID agitation on 4/25.      PAST MEDICAL & SURGICAL HISTORY:  Type 2 diabetes mellitus      FAMILY HISTORY:      SOCIAL HISTORY:  Denies smoking, drinking, or drug use    ROS: SOB      MEDICATIONS  (STANDING):  allopurinol 300 milliGRAM(s) Oral daily  aspirin enteric coated 81 milliGRAM(s) Oral daily  atorvastatin 20 milliGRAM(s) Oral at bedtime  cholecalciferol 1000 Unit(s) Oral every 24 hours  dextrose 5% + sodium chloride 0.45%. 1000 milliLiter(s) (60 mL/Hr) IV Continuous <Continuous>  dextrose 5%. 1000 milliLiter(s) (50 mL/Hr) IV Continuous <Continuous>  dextrose 50% Injectable 12.5 Gram(s) IV Push once  dextrose 50% Injectable 25 Gram(s) IV Push once  dextrose 50% Injectable 25 Gram(s) IV Push once  enoxaparin Injectable 40 milliGRAM(s) SubCutaneous every 24 hours  famotidine    Tablet 20 milliGRAM(s) Oral daily  insulin glargine Injectable (LANTUS) 25 Unit(s) SubCutaneous at bedtime  insulin lispro (HumaLOG) corrective regimen sliding scale   SubCutaneous four times a day with meals  levothyroxine 137 MICROGram(s) Oral daily  losartan 25 milliGRAM(s) Oral daily  piperacillin/tazobactam IVPB.. 3.375 Gram(s) IV Intermittent every 6 hours    MEDICATIONS  (PRN):  acetaminophen   Tablet .. 650 milliGRAM(s) Oral every 6 hours PRN Temp greater or equal to 38C (100.4F)  dextrose 40% Gel 15 Gram(s) Oral once PRN Blood Glucose LESS THAN 70 milliGRAM(s)/deciliter  glucagon  Injectable 1 milliGRAM(s) IntraMuscular once PRN Glucose LESS THAN 70 milligrams/deciliter      Allergies    No Known Allergies    Intolerances        Vital Signs Last 24 Hrs  T(C): 36.2 (25 Apr 2020 19:14), Max: 36.9 (25 Apr 2020 13:46)  T(F): 97.1 (25 Apr 2020 19:14), Max: 98.4 (25 Apr 2020 13:46)  HR: 68 (25 Apr 2020 23:05) (68 - 80)  BP: 112/59 (25 Apr 2020 23:05) (110/57 - 128/71)  BP(mean): 81 (25 Apr 2020 23:05) (81 - 81)  RR: 20 (25 Apr 2020 23:05) (20 - 22)  SpO2: 91% (25 Apr 2020 23:05) (91% - 94%)     EXAM   Prone position, frustrated  Neuro- AAOX3, fluent, attends b/l  EOMI, No facial asymmetry   moving all extremities    LABS:                        14.8   13.15 )-----------( 271      ( 25 Apr 2020 07:18 )             44.4     04-25    135  |  100  |  26<H>  ----------------------------<  141<H>  4.6   |  19<L>  |  1.74<H>    Ca    9.2      25 Apr 2020 07:18  Phos  2.7     04-25  Mg     1.8     04-25    TPro  6.4  /  Alb  3.0<L>  /  TBili  0.4  /  DBili  x   /  AST  95<H>  /  ALT  93<H>  /  AlkPhos  44  04-25    COVID Labs:   Ferritin, Serum: 937 ng/mL (04.25.20 @ 07:18)    D-Dimer Assay, Quantitative: 533 ng/mL DDU (04.25.20 @ 07:18)    C-Reactive Protein, Serum: 10.12 mg/dL (04.25.20 @ 07:18)    COVID course:  Symptom onset: 4/14  Admission date: 4/21      RADIOLOGY & ADDITIONAL TESTS:  CT Head No Cont (04.21.20 @ 11:09) >    There is no evidence of acute intracranial hemorrhage or acute transcortical infarct. There is enlargement the sulci, cisterns and ventricles consistent with mild cerebral and cerebellar volume loss. There is no evidence of mass-effect or midline shift. There is no evidence of an intra or extra-axial fluid collection.    Visualized paranasal sinuses and bilateral mastoid air cells are clear.    Impression: No acute intracranial injury.

## 2020-04-26 NOTE — PROGRESS NOTE ADULT - PROBLEM SELECTOR PLAN 5
Hx of DM on basal 28U and premeal 6U, also on statin, ARB  - Mod ISS  - Lantus 18 qHS and hold premeal --> will see requirements from sliding scale  -A1C 6.6     #Neuropathy  Hx of neuropathy on duloxetine and gabapentin  -will avoid in setting of metabolic encephalopathy

## 2020-04-26 NOTE — PROGRESS NOTE ADULT - SUBJECTIVE AND OBJECTIVE BOX
Please refer to CVD fellow note written above for details.  I reviewed the fellow's documentation, and I have personally seen and examined the patient. I have reviewed vitals, labs, medications, cardiac studies and additional imaging. I agree with the findings and plans as written above with the following additions/amendments:     neuro: Aox3, cont to monitor  CV: borderline hypotensive. cont to hold anti-htn  PULM: s/p azitro, hydroxychloroquine, tocilizumab . currrently on HFNC. cont to carefully monitor sat. consider awake proning  JACKSON: appreciate renal recs  ENDO: lantus+ ISS  ID: started on zosyn from floor team, to complete a 7 days course

## 2020-04-26 NOTE — PROGRESS NOTE ADULT - ASSESSMENT
75M PMH of DM presented for 1 week of fevers. Patient's wife reported fall 1 week ago hitting head and noted increased fatigue/sleepiness. Found to be COVID+, with hyponatremia at [facility]. Admitted to [facility] for management of hyponatremia and AHRF 2/2 covid, currently on BiPAP

## 2020-04-26 NOTE — PROGRESS NOTE ADULT - ASSESSMENT
74 yo M PMH of DM admitted for covid19 related fever, pneumonia.  Nephrology consulted for Hyponatremia overcorrection.      # Hyponatremia   na @ 122 on admission,   NA now within normal range- remains @ 135      # LIZ vs ckd  - unknown baseline Cr, on admission 1.9,   cr remains in mid 1s during this hospitalization  renal diet  recommend renal us to assess kidneys echogenicity and size      acidosis  likely 2nd to CKD and Starvation ketosis as BOHB was mildly elevated  BOHB now wnl normal range   can stop IVF - 1/2 NS with dextrose as na may go down  can give sodium bicarbonate 650 mg po tid      renal bone disease  check pth, vit D 25  phos noted

## 2020-04-26 NOTE — PROGRESS NOTE ADULT - PROBLEM SELECTOR PLAN 3
Noted persistent fevers, chills, no cough, +SOB, COVID+,+ lymphopenia, CRP 11.02, Ferritin 520  - CXR 4/25 unchanged from prior day  - Wean as tolerated   - Continue to trend inflammatory markers  - Currently on hydroxychloroquine and azithromycin (day 4 of 5)  - Placed on cefpodoxime (4/21) to cover CAP, switched to zosyn (day 5 of abx)  - Toci 4/24

## 2020-04-26 NOTE — PROGRESS NOTE ADULT - PROBLEM SELECTOR PLAN 1
Noted hyponatremia to 122, on repeat on arrival to [facility] noted to be 134, patient was initially AAOx1-2, now AAOx3 with mental status improving. Legionella and TSH negative.   -Continue following renal recs, fluid restriction   -D5W d/c 4/22. 4/24 Na 134  -holding duloxetine temporarily in setting of hyponatremia

## 2020-04-26 NOTE — PROGRESS NOTE ADULT - PROBLEM SELECTOR PLAN 2
Noted progressively worsening confusion, difficulty performing activities of daily living over last week. CT head negative. Associated with hyponatremia, and covid with high fevers  - Avoid sedating medications, gabapentin held  - Hyponatremia, f/u renal recs management as above  - Tx covid infection, Tylenol PRN for fevers  - Improved  - f/u neuro recs

## 2020-04-27 LAB
ALBUMIN SERPL ELPH-MCNC: 3 G/DL — LOW (ref 3.3–5)
ALP SERPL-CCNC: 52 U/L — SIGNIFICANT CHANGE UP (ref 40–120)
ALT FLD-CCNC: 84 U/L — HIGH (ref 10–45)
ANION GAP SERPL CALC-SCNC: 14 MMOL/L — SIGNIFICANT CHANGE UP (ref 5–17)
AST SERPL-CCNC: 78 U/L — HIGH (ref 10–40)
BASOPHILS # BLD AUTO: 0 K/UL — SIGNIFICANT CHANGE UP (ref 0–0.2)
BASOPHILS NFR BLD AUTO: 0 % — SIGNIFICANT CHANGE UP (ref 0–2)
BILIRUB SERPL-MCNC: 0.3 MG/DL — SIGNIFICANT CHANGE UP (ref 0.2–1.2)
BUN SERPL-MCNC: 34 MG/DL — HIGH (ref 7–23)
CALCIUM SERPL-MCNC: 9.4 MG/DL — SIGNIFICANT CHANGE UP (ref 8.4–10.5)
CALCIUM SERPL-MCNC: 9.7 MG/DL — SIGNIFICANT CHANGE UP (ref 8.4–10.5)
CHLORIDE SERPL-SCNC: 102 MMOL/L — SIGNIFICANT CHANGE UP (ref 96–108)
CK SERPL-CCNC: 110 U/L — SIGNIFICANT CHANGE UP (ref 30–200)
CO2 SERPL-SCNC: 24 MMOL/L — SIGNIFICANT CHANGE UP (ref 22–31)
CREAT SERPL-MCNC: 1.78 MG/DL — HIGH (ref 0.5–1.3)
CRP SERPL-MCNC: 4.6 MG/DL — HIGH (ref 0–0.4)
D DIMER BLD IA.RAPID-MCNC: 781 NG/ML DDU — HIGH
EOSINOPHIL # BLD AUTO: 0 K/UL — SIGNIFICANT CHANGE UP (ref 0–0.5)
EOSINOPHIL NFR BLD AUTO: 0 % — SIGNIFICANT CHANGE UP (ref 0–6)
FERRITIN SERPL-MCNC: 1066 NG/ML — HIGH (ref 30–400)
GLUCOSE BLDC GLUCOMTR-MCNC: 146 MG/DL — HIGH (ref 70–99)
GLUCOSE BLDC GLUCOMTR-MCNC: 157 MG/DL — HIGH (ref 70–99)
GLUCOSE BLDC GLUCOMTR-MCNC: 170 MG/DL — HIGH (ref 70–99)
GLUCOSE BLDC GLUCOMTR-MCNC: 171 MG/DL — HIGH (ref 70–99)
GLUCOSE SERPL-MCNC: 156 MG/DL — HIGH (ref 70–99)
HCT VFR BLD CALC: 45 % — SIGNIFICANT CHANGE UP (ref 39–50)
HGB BLD-MCNC: 14.6 G/DL — SIGNIFICANT CHANGE UP (ref 13–17)
LYMPHOCYTES # BLD AUTO: 0.38 K/UL — LOW (ref 1–3.3)
LYMPHOCYTES # BLD AUTO: 3.5 % — LOW (ref 13–44)
MAGNESIUM SERPL-MCNC: 2 MG/DL — SIGNIFICANT CHANGE UP (ref 1.6–2.6)
MCHC RBC-ENTMCNC: 30.8 PG — SIGNIFICANT CHANGE UP (ref 27–34)
MCHC RBC-ENTMCNC: 32.4 GM/DL — SIGNIFICANT CHANGE UP (ref 32–36)
MCV RBC AUTO: 94.9 FL — SIGNIFICANT CHANGE UP (ref 80–100)
MONOCYTES # BLD AUTO: 0.38 K/UL — SIGNIFICANT CHANGE UP (ref 0–0.9)
MONOCYTES NFR BLD AUTO: 3.5 % — SIGNIFICANT CHANGE UP (ref 2–14)
NEUTROPHILS # BLD AUTO: 10.08 K/UL — HIGH (ref 1.8–7.4)
NEUTROPHILS NFR BLD AUTO: 93 % — HIGH (ref 43–77)
PHOSPHATE SERPL-MCNC: 2.8 MG/DL — SIGNIFICANT CHANGE UP (ref 2.5–4.5)
PLATELET # BLD AUTO: 274 K/UL — SIGNIFICANT CHANGE UP (ref 150–400)
POTASSIUM SERPL-MCNC: 4.7 MMOL/L — SIGNIFICANT CHANGE UP (ref 3.5–5.3)
POTASSIUM SERPL-SCNC: 4.7 MMOL/L — SIGNIFICANT CHANGE UP (ref 3.5–5.3)
PROT SERPL-MCNC: 6.5 G/DL — SIGNIFICANT CHANGE UP (ref 6–8.3)
PTH-INTACT FLD-MCNC: 15 PG/ML — SIGNIFICANT CHANGE UP (ref 15–65)
RBC # BLD: 4.74 M/UL — SIGNIFICANT CHANGE UP (ref 4.2–5.8)
RBC # FLD: 15 % — HIGH (ref 10.3–14.5)
SODIUM SERPL-SCNC: 140 MMOL/L — SIGNIFICANT CHANGE UP (ref 135–145)
WBC # BLD: 10.84 K/UL — HIGH (ref 3.8–10.5)
WBC # FLD AUTO: 10.84 K/UL — HIGH (ref 3.8–10.5)

## 2020-04-27 PROCEDURE — 93970 EXTREMITY STUDY: CPT | Mod: 26

## 2020-04-27 PROCEDURE — 99233 SBSQ HOSP IP/OBS HIGH 50: CPT | Mod: CS,GC

## 2020-04-27 PROCEDURE — 93010 ELECTROCARDIOGRAM REPORT: CPT

## 2020-04-27 PROCEDURE — 71045 X-RAY EXAM CHEST 1 VIEW: CPT | Mod: 26

## 2020-04-27 PROCEDURE — 99232 SBSQ HOSP IP/OBS MODERATE 35: CPT

## 2020-04-27 RX ORDER — OLANZAPINE 15 MG/1
5 TABLET, FILM COATED ORAL EVERY 6 HOURS
Refills: 0 | Status: DISCONTINUED | OUTPATIENT
Start: 2020-04-27 | End: 2020-04-27

## 2020-04-27 RX ORDER — HALOPERIDOL DECANOATE 100 MG/ML
5 INJECTION INTRAMUSCULAR ONCE
Refills: 0 | Status: COMPLETED | OUTPATIENT
Start: 2020-04-27 | End: 2020-04-27

## 2020-04-27 RX ORDER — AMLODIPINE BESYLATE 2.5 MG/1
5 TABLET ORAL DAILY
Refills: 0 | Status: DISCONTINUED | OUTPATIENT
Start: 2020-04-27 | End: 2020-04-29

## 2020-04-27 RX ORDER — DULOXETINE HYDROCHLORIDE 30 MG/1
20 CAPSULE, DELAYED RELEASE ORAL DAILY
Refills: 0 | Status: DISCONTINUED | OUTPATIENT
Start: 2020-04-27 | End: 2020-05-02

## 2020-04-27 RX ORDER — OLANZAPINE 15 MG/1
5 TABLET, FILM COATED ORAL AT BEDTIME
Refills: 0 | Status: DISCONTINUED | OUTPATIENT
Start: 2020-04-27 | End: 2020-04-27

## 2020-04-27 RX ADMIN — PIPERACILLIN AND TAZOBACTAM 200 GRAM(S): 4; .5 INJECTION, POWDER, LYOPHILIZED, FOR SOLUTION INTRAVENOUS at 07:24

## 2020-04-27 RX ADMIN — PIPERACILLIN AND TAZOBACTAM 200 GRAM(S): 4; .5 INJECTION, POWDER, LYOPHILIZED, FOR SOLUTION INTRAVENOUS at 00:22

## 2020-04-27 RX ADMIN — Medication 2: at 23:02

## 2020-04-27 RX ADMIN — Medication 2: at 17:11

## 2020-04-27 RX ADMIN — INSULIN GLARGINE 18 UNIT(S): 100 INJECTION, SOLUTION SUBCUTANEOUS at 23:03

## 2020-04-27 RX ADMIN — ENOXAPARIN SODIUM 40 MILLIGRAM(S): 100 INJECTION SUBCUTANEOUS at 18:12

## 2020-04-27 RX ADMIN — HALOPERIDOL DECANOATE 5 MILLIGRAM(S): 100 INJECTION INTRAMUSCULAR at 04:40

## 2020-04-27 RX ADMIN — HALOPERIDOL DECANOATE 5 MILLIGRAM(S): 100 INJECTION INTRAMUSCULAR at 00:51

## 2020-04-27 RX ADMIN — Medication 2: at 11:36

## 2020-04-27 NOTE — PROGRESS NOTE ADULT - SUBJECTIVE AND OBJECTIVE BOX
Neurology Progress Note    INTERVAL HPI  Patient seen and examined by Dr. Callahan.     MEDICATIONS  (STANDING):  allopurinol 300 milliGRAM(s) Oral daily  amLODIPine   Tablet 5 milliGRAM(s) Oral daily  atorvastatin 20 milliGRAM(s) Oral at bedtime  cholecalciferol 1000 Unit(s) Oral every 24 hours  dextrose 5%. 1000 milliLiter(s) (50 mL/Hr) IV Continuous <Continuous>  dextrose 50% Injectable 12.5 Gram(s) IV Push once  dextrose 50% Injectable 25 Gram(s) IV Push once  dextrose 50% Injectable 25 Gram(s) IV Push once  enoxaparin Injectable 40 milliGRAM(s) SubCutaneous every 24 hours  famotidine    Tablet 20 milliGRAM(s) Oral daily  insulin glargine Injectable (LANTUS) 18 Unit(s) SubCutaneous at bedtime  insulin lispro (HumaLOG) corrective regimen sliding scale   SubCutaneous four times a day with meals  levothyroxine 137 MICROGram(s) Oral daily  sodium bicarbonate 650 milliGRAM(s) Oral three times a day    MEDICATIONS  (PRN):  acetaminophen   Tablet .. 650 milliGRAM(s) Oral every 6 hours PRN Temp greater or equal to 38C (100.4F)  dextrose 40% Gel 15 Gram(s) Oral once PRN Blood Glucose LESS THAN 70 milliGRAM(s)/deciliter  glucagon  Injectable 1 milliGRAM(s) IntraMuscular once PRN Glucose LESS THAN 70 milligrams/deciliter      Allergies    No Known Allergies    Intolerances      Vital Signs Last 24 Hrs  T(C): 36.1 (27 Apr 2020 10:11), Max: 37.2 (27 Apr 2020 05:51)  T(F): 96.9 (27 Apr 2020 10:11), Max: 99 (27 Apr 2020 05:51)  HR: 72 (27 Apr 2020 00:00) (66 - 72)  BP: 120/58 (27 Apr 2020 00:00) (103/51 - 120/58)  BP(mean): 81 (27 Apr 2020 00:00) (70 - 84)  RR: 21 (27 Apr 2020 00:00) (20 - 26)  SpO2: 95% (27 Apr 2020 00:00) (87% - 95%)    Neuro Exam:  -Mental status: Awake, alert, oriented to person, place, and time. Speech is limited to BIPAP mask. Patient able to follow verbal commands.  -Oculomotor intact  -No facial asymmetry  -Able to move all four extremities; antigravity     COVID LABS:   D-Dimer Assay, Quantitative: 781 (04-27-20)  D-Dimer Assay, Quantitative: 594 (04-26-20)  D-Dimer Assay, Quantitative: 533 (04-25-20)  D-Dimer Assay, Quantitative: 443 (04-24-20)  D-Dimer Assay, Quantitative: 307 (04-23-20)  D-Dimer Assay, Quantitative: 229 (04-23-20)  D-Dimer Assay, Quantitative: 161 (04-21-20)      Ferritin, Serum: 1066 (04-27 @ 07:11)  Ferritin, Serum: 1104 (04-26 @ 07:09)  Ferritin, Serum: 937 (04-25 @ 07:18)  Ferritin, Serum: 999 (04-24 @ 14:10)  Ferritin, Serum: 753 (04-23 @ 06:32)  Ferritin, Serum: 553 (04-22 @ 07:08)  Ferritin, Serum: 520 (04-21 @ 16:03)      C-Reactive Protein, Serum: 4.60 (04-27 @ 07:11)  C-Reactive Protein, Serum: 8.72 (04-26 @ 07:09)  C-Reactive Protein, Serum: 10.12 (04-25 @ 07:18)  C-Reactive Protein, Serum: 11.63 (04-24 @ 14:10)  C-Reactive Protein, Serum: 16.03 (04-23 @ 06:32)  C-Reactive Protein, Serum: 11.43 (04-22 @ 07:08)  C-Reactive Protein, Serum: 11.02 (04-21 @ 16:03)  C-Reactive Protein, Serum: >12.0 (04-21 @ 11:15)                            14.6   10.84 )-----------( 274      ( 27 Apr 2020 07:11 )             45.0     04-27    140  |  102  |  34<H>  ----------------------------<  156<H>  4.7   |  24  |  1.78<H>    Ca    9.7      27 Apr 2020 07:11  Phos  2.8     04-27  Mg     2.0     04-27    TPro  6.5  /  Alb  3.0<L>  /  TBili  0.3  /  DBili  x   /  AST  78<H>  /  ALT  84<H>  /  AlkPhos  52  04-27 Neurology Progress Note    INTERVAL HPI  Patient seen and examined by Dr. Callahan.     MEDICATIONS  (STANDING):  allopurinol 300 milliGRAM(s) Oral daily  amLODIPine   Tablet 5 milliGRAM(s) Oral daily  atorvastatin 20 milliGRAM(s) Oral at bedtime  cholecalciferol 1000 Unit(s) Oral every 24 hours  dextrose 5%. 1000 milliLiter(s) (50 mL/Hr) IV Continuous <Continuous>  dextrose 50% Injectable 12.5 Gram(s) IV Push once  dextrose 50% Injectable 25 Gram(s) IV Push once  dextrose 50% Injectable 25 Gram(s) IV Push once  enoxaparin Injectable 40 milliGRAM(s) SubCutaneous every 24 hours  famotidine    Tablet 20 milliGRAM(s) Oral daily  insulin glargine Injectable (LANTUS) 18 Unit(s) SubCutaneous at bedtime  insulin lispro (HumaLOG) corrective regimen sliding scale   SubCutaneous four times a day with meals  levothyroxine 137 MICROGram(s) Oral daily  sodium bicarbonate 650 milliGRAM(s) Oral three times a day    MEDICATIONS  (PRN):  acetaminophen   Tablet .. 650 milliGRAM(s) Oral every 6 hours PRN Temp greater or equal to 38C (100.4F)  dextrose 40% Gel 15 Gram(s) Oral once PRN Blood Glucose LESS THAN 70 milliGRAM(s)/deciliter  glucagon  Injectable 1 milliGRAM(s) IntraMuscular once PRN Glucose LESS THAN 70 milligrams/deciliter      Allergies    No Known Allergies    Intolerances      Vital Signs Last 24 Hrs  T(C): 36.1 (27 Apr 2020 10:11), Max: 37.2 (27 Apr 2020 05:51)  T(F): 96.9 (27 Apr 2020 10:11), Max: 99 (27 Apr 2020 05:51)  HR: 72 (27 Apr 2020 00:00) (66 - 72)  BP: 120/58 (27 Apr 2020 00:00) (103/51 - 120/58)  BP(mean): 81 (27 Apr 2020 00:00) (70 - 84)  RR: 21 (27 Apr 2020 00:00) (20 - 26)  SpO2: 95% (27 Apr 2020 00:00) (87% - 95%)    Neuro Exam:  -Mental status: Awake, alert, oriented to person, place, and time. Speech is limited to BIPAP mask. Patient able to follow verbal commands.  -EOMI, No facial asymmetry  -Able to move all four extremities; antigravity     COVID LABS:   D-Dimer Assay, Quantitative: 781 (04-27-20)  D-Dimer Assay, Quantitative: 594 (04-26-20)  D-Dimer Assay, Quantitative: 533 (04-25-20)  D-Dimer Assay, Quantitative: 443 (04-24-20)  D-Dimer Assay, Quantitative: 307 (04-23-20)  D-Dimer Assay, Quantitative: 229 (04-23-20)  D-Dimer Assay, Quantitative: 161 (04-21-20)      Ferritin, Serum: 1066 (04-27 @ 07:11)  Ferritin, Serum: 1104 (04-26 @ 07:09)  Ferritin, Serum: 937 (04-25 @ 07:18)  Ferritin, Serum: 999 (04-24 @ 14:10)  Ferritin, Serum: 753 (04-23 @ 06:32)  Ferritin, Serum: 553 (04-22 @ 07:08)  Ferritin, Serum: 520 (04-21 @ 16:03)      C-Reactive Protein, Serum: 4.60 (04-27 @ 07:11)  C-Reactive Protein, Serum: 8.72 (04-26 @ 07:09)  C-Reactive Protein, Serum: 10.12 (04-25 @ 07:18)  C-Reactive Protein, Serum: 11.63 (04-24 @ 14:10)  C-Reactive Protein, Serum: 16.03 (04-23 @ 06:32)  C-Reactive Protein, Serum: 11.43 (04-22 @ 07:08)  C-Reactive Protein, Serum: 11.02 (04-21 @ 16:03)  C-Reactive Protein, Serum: >12.0 (04-21 @ 11:15)                            14.6   10.84 )-----------( 274      ( 27 Apr 2020 07:11 )             45.0     04-27    140  |  102  |  34<H>  ----------------------------<  156<H>  4.7   |  24  |  1.78<H>    Ca    9.7      27 Apr 2020 07:11  Phos  2.8     04-27  Mg     2.0     04-27    TPro  6.5  /  Alb  3.0<L>  /  TBili  0.3  /  DBili  x   /  AST  78<H>  /  ALT  84<H>  /  AlkPhos  52  04-27

## 2020-04-27 NOTE — PROGRESS NOTE ADULT - SUBJECTIVE AND OBJECTIVE BOX
OVERNIGHT EVENTS: No acute events    SUBJECTIVE / INTERVAL HPI: Patient seen and examined at bedside. Pt dyspneic and is uncomfortable on bipap.     VITAL SIGNS:  Vital Signs Last 24 Hrs  T(C): 36.4 (27 Apr 2020 17:34), Max: 37.2 (27 Apr 2020 05:51)  T(F): 97.6 (27 Apr 2020 17:34), Max: 99 (27 Apr 2020 05:51)  HR: 80 (27 Apr 2020 16:05) (68 - 88)  BP: 134/74 (27 Apr 2020 16:05) (118/58 - 134/74)  BP(mean): 95 (27 Apr 2020 16:05) (81 - 95)  RR: 20 (27 Apr 2020 11:30) (20 - 25)  SpO2: 94% (27 Apr 2020 16:05) (87% - 97%)    PHYSICAL EXAM:  General: On bipap  HEENT: dry MM    MEDICATIONS:  MEDICATIONS  (STANDING):  allopurinol 300 milliGRAM(s) Oral daily  amLODIPine   Tablet 5 milliGRAM(s) Oral daily  atorvastatin 20 milliGRAM(s) Oral at bedtime  cholecalciferol 1000 Unit(s) Oral every 24 hours  dextrose 5%. 1000 milliLiter(s) (50 mL/Hr) IV Continuous <Continuous>  dextrose 50% Injectable 12.5 Gram(s) IV Push once  dextrose 50% Injectable 25 Gram(s) IV Push once  dextrose 50% Injectable 25 Gram(s) IV Push once  DULoxetine 20 milliGRAM(s) Oral daily  enoxaparin Injectable 40 milliGRAM(s) SubCutaneous every 24 hours  famotidine    Tablet 20 milliGRAM(s) Oral daily  insulin glargine Injectable (LANTUS) 18 Unit(s) SubCutaneous at bedtime  insulin lispro (HumaLOG) corrective regimen sliding scale   SubCutaneous Before meals and at bedtime  levothyroxine 137 MICROGram(s) Oral daily  OLANZapine 5 milliGRAM(s) Oral at bedtime  sodium bicarbonate 650 milliGRAM(s) Oral three times a day    MEDICATIONS  (PRN):  acetaminophen   Tablet .. 650 milliGRAM(s) Oral every 6 hours PRN Temp greater or equal to 38C (100.4F)  dextrose 40% Gel 15 Gram(s) Oral once PRN Blood Glucose LESS THAN 70 milliGRAM(s)/deciliter  glucagon  Injectable 1 milliGRAM(s) IntraMuscular once PRN Glucose LESS THAN 70 milligrams/deciliter  OLANZapine 5 milliGRAM(s) Oral every 6 hours PRN agitation      ALLERGIES:  Allergies    No Known Allergies    Intolerances        LABS:                        14.6   10.84 )-----------( 274      ( 27 Apr 2020 07:11 )             45.0     04-27    140  |  102  |  34<H>  ----------------------------<  156<H>  4.7   |  24  |  1.78<H>    Ca    9.7      27 Apr 2020 07:11  Phos  2.8     04-27  Mg     2.0     04-27    TPro  6.5  /  Alb  3.0<L>  /  TBili  0.3  /  DBili  x   /  AST  78<H>  /  ALT  84<H>  /  AlkPhos  52  04-27        CAPILLARY BLOOD GLUCOSE      POCT Blood Glucose.: 170 mg/dL (27 Apr 2020 17:01)      RADIOLOGY & ADDITIONAL TESTS: Reviewed. OVERNIGHT EVENTS: No acute events    SUBJECTIVE / INTERVAL HPI: Patient seen and examined at bedside. Pt dyspneic and stated that the BiPAP is uncomfortable. Otherwise, pt wishes to be full code.     VITAL SIGNS:  Vital Signs Last 24 Hrs  T(C): 36.4 (27 Apr 2020 17:34), Max: 37.2 (27 Apr 2020 05:51)  T(F): 97.6 (27 Apr 2020 17:34), Max: 99 (27 Apr 2020 05:51)  HR: 80 (27 Apr 2020 16:05) (68 - 88)  BP: 134/74 (27 Apr 2020 16:05) (118/58 - 134/74)  BP(mean): 95 (27 Apr 2020 16:05) (81 - 95)  RR: 20 (27 Apr 2020 11:30) (20 - 25)  SpO2: 94% (27 Apr 2020 16:05) (87% - 97%)    PHYSICAL EXAM:  General: On bipap  HEENT: dry MM    MEDICATIONS:  MEDICATIONS  (STANDING):  allopurinol 300 milliGRAM(s) Oral daily  amLODIPine   Tablet 5 milliGRAM(s) Oral daily  atorvastatin 20 milliGRAM(s) Oral at bedtime  cholecalciferol 1000 Unit(s) Oral every 24 hours  dextrose 5%. 1000 milliLiter(s) (50 mL/Hr) IV Continuous <Continuous>  dextrose 50% Injectable 12.5 Gram(s) IV Push once  dextrose 50% Injectable 25 Gram(s) IV Push once  dextrose 50% Injectable 25 Gram(s) IV Push once  DULoxetine 20 milliGRAM(s) Oral daily  enoxaparin Injectable 40 milliGRAM(s) SubCutaneous every 24 hours  famotidine    Tablet 20 milliGRAM(s) Oral daily  insulin glargine Injectable (LANTUS) 18 Unit(s) SubCutaneous at bedtime  insulin lispro (HumaLOG) corrective regimen sliding scale   SubCutaneous Before meals and at bedtime  levothyroxine 137 MICROGram(s) Oral daily  OLANZapine 5 milliGRAM(s) Oral at bedtime  sodium bicarbonate 650 milliGRAM(s) Oral three times a day    MEDICATIONS  (PRN):  acetaminophen   Tablet .. 650 milliGRAM(s) Oral every 6 hours PRN Temp greater or equal to 38C (100.4F)  dextrose 40% Gel 15 Gram(s) Oral once PRN Blood Glucose LESS THAN 70 milliGRAM(s)/deciliter  glucagon  Injectable 1 milliGRAM(s) IntraMuscular once PRN Glucose LESS THAN 70 milligrams/deciliter  OLANZapine 5 milliGRAM(s) Oral every 6 hours PRN agitation      ALLERGIES:  Allergies    No Known Allergies    Intolerances        LABS:                        14.6   10.84 )-----------( 274      ( 27 Apr 2020 07:11 )             45.0     04-27    140  |  102  |  34<H>  ----------------------------<  156<H>  4.7   |  24  |  1.78<H>    Ca    9.7      27 Apr 2020 07:11  Phos  2.8     04-27  Mg     2.0     04-27    TPro  6.5  /  Alb  3.0<L>  /  TBili  0.3  /  DBili  x   /  AST  78<H>  /  ALT  84<H>  /  AlkPhos  52  04-27        CAPILLARY BLOOD GLUCOSE      POCT Blood Glucose.: 170 mg/dL (27 Apr 2020 17:01)      RADIOLOGY & ADDITIONAL TESTS: Reviewed.

## 2020-04-27 NOTE — PROGRESS NOTE ADULT - PROBLEM SELECTOR PLAN 5
Hx of DM on basal 28U and premeal 6U, also on statin, ARB  - Mod ISS  - Lantus 18 qHS and hold premeal --> will see requirements from sliding scale  -A1C 6.6     #Neuropathy  Hx of neuropathy on duloxetine and gabapentin  -restarted duloxetine

## 2020-04-27 NOTE — PROGRESS NOTE ADULT - PROBLEM SELECTOR PLAN 3
Noted progressively worsening confusion, difficulty performing activities of daily living over last week. CT head negative. Associated with hyponatremia, and covid with high fevers  - Avoid sedating medications, gabapentin held  - Hyponatremia, f/u renal recs management as above  - Tx covid infection, Tylenol PRN for fevers   - Improved - back to baseline  - f/u neuro recs Resolved. Noted progressively worsening confusion, difficulty performing activities of daily living over last week. CT head negative. Associated with hyponatremia  - Avoid sedating medications, gabapentin held  - Hyponatremia, f/u renal recs management as above  - Tx covid infection, Tylenol PRN for fevers   - Improved - back to baseline  - f/u neuro recs

## 2020-04-27 NOTE — PROGRESS NOTE ADULT - PROBLEM SELECTOR PLAN 7
Hx of HTN on losartan and atenolol  -losartan Hx of HTN on losartan and atenolol  -holding losartan in setting of eulalia  -started norvasc 5 mg

## 2020-04-27 NOTE — PROGRESS NOTE ADULT - PROBLEM SELECTOR PLAN 2
Noted hyponatremia to 122, on repeat on arrival to [facility] noted to be 134, patient was initially AAOx1-2, now AAOx3 with mental status improving. Legionella and TSH negative.   -Continue following renal recs, fluid restriction   -D5W d/c 4/22. 4/24 Na 134 Noted hyponatremia to 122, on repeat on arrival to [facility] noted to be 134, patient was initially AAOx1-2, now AAOx3 with mental status improving. Legionella and TSH negative.   -Continue following renal recs  -D5W d/c 4/22. 4/24 Na 134    #AGMA 2/2 starvation ketosis (resolved)  -ketones in urine with BHB of 0.9  -s/p D5 and 1/2 NS drip

## 2020-04-27 NOTE — PROGRESS NOTE ADULT - PROBLEM SELECTOR PLAN 4
Noted Cr at 1.91, unknown baseline but noted improvement to 1.64 with 3L IVF. Possible COVID component to kidney injury as well.   - f/u repeat urine lytes - prerenal  - Hold home losartan in setting of LIZ - started norvasc 5   - avoid nephrotoxic agents

## 2020-04-27 NOTE — PROGRESS NOTE ADULT - ASSESSMENT
75 year old male patient with a PMH of DM presented for 1 week of fevers. Patient's wife reported fall 1 week ago hitting head and noted increased fatigue/sleepiness. Found to be COVID+, with hyponatremia at [facility]. Admitted to [facility] for management of hyponatremia and COVID pneumonia.      Neuro consulted for agitation/AMS, suspect non-specific encephalopathy multifactorial (ie hypoxia, hyponatremia, systemic infection), CTH on admission with no acute findings. Currently MS improving as patient oriented x 3.    - treat underlying metabolic derangements   - Avoid sedating medications  - Zyprexa 2.5mg IV PRN for severe agitation  - Will consider MRI brain and EEG if recurs 75 year old male patient with a PMH of DM presented for 1 week of fevers. Patient's wife reported fall 1 week ago hitting head and noted increased fatigue/sleepiness. Found to be COVID+, with hyponatremia at [facility]. Admitted to [facility] for management of hyponatremia and COVID pneumonia.      Neuro consulted for agitation/AMS, suspect non-specific encephalopathy multifactorial (ie hypoxia, hyponatremia, systemic infection), CTH on admission with no acute findings. Currently MS improving as patient oriented x 3.    - treat underlying metabolic derangements   - Avoid sedating medications  - Zyprexa 2.5mg IV PRN for severe agitation  - Will consider MRI brain and EEG if recurs   - Continue to trend D-Dimer, Ferritin, and CRP 75 year old male patient with a PMH of DM presented for 1 week of fevers. Patient's wife reported fall 1 week ago hitting head and noted increased fatigue/sleepiness. Found to be COVID+, with hyponatremia at [facility]. Admitted to [facility] for management of hyponatremia and COVID pneumonia.      Neuro consulted for agitation/AMS, suspect non-specific encephalopathy multifactorial (ie hypoxia, hyponatremia, systemic infection), CTH on admission with no acute findings. Currently MS improving as patient oriented x 3.    - treat underlying metabolic derangements   - Avoid sedating medications  - Zyprexa 2.5mg IV PRN for severe agitation  - Will consider MRI brain and EEG if recurs   - Continue to trend D-Dimer, Ferritin, and CRP  - inpatient management per primary team    COVID course:  - symptom onset: 4/14  - admission date: 4/21  - intubation date:  - extubation date:    COVID Labs  Peak: D-Dimer 533  4/25 , CRP  16.03 4/23 , ferritin 999  4/24 75 year old male patient with a PMH of DM presented for 1 week of fevers. Patient's wife reported fall 1 week ago hitting head and noted increased fatigue/sleepiness. Found to be COVID+, with hyponatremia at [facility]. Admitted to [facility] for management of hyponatremia and COVID pneumonia.      Neuro consulted for agitation/AMS, suspect non-specific encephalopathy multifactorial (ie hypoxia, hyponatremia, systemic infection), CTH on admission with no acute findings. Currently MS improving as patient oriented x 3.    - treat underlying metabolic derangements   - Avoid sedating medications  - Zyprexa 2.5mg IV PRN for severe agitation  - Will consider MRI brain and EEG if recurs   - Continue to trend D-Dimer, Ferritin, and CRP  - inpatient management per primary team  - Please call Neurology with additional questions as needed    COVID course:  - symptom onset: 4/14  - admission date: 4/21  - intubation date:  - extubation date:    COVID Labs  Peak: D-Dimer 533  4/25 , CRP  16.03 4/23 , ferritin 999  4/24

## 2020-04-27 NOTE — PROGRESS NOTE ADULT - PROBLEM SELECTOR PLAN 1
#Acute hypoxemic respiratory failure from COVID  -pt with increasing O2 requirements, now desaturating on HFNC, requiring BiPAP  - CXR 4/25 unchanged from prior day  - Continue to trend inflammatory markers  - s/p hydroxychloroquine and azithromycin  - Placed on cefpodoxime (4/21) to cover CAP, switched to zosyn completed on 4/27  - Toci 4/24 #Acute hypoxemic respiratory failure from COVID  -pt with increasing O2 requirements, now desaturating on HFNC, requiring BiPAP  - CXR 4/25 unchanged from prior day  - Continue to trend inflammatory markers  - s/p hydroxychloroquine and azithromycin  - Placed on cefpodoxime (4/21) to cover CAP, switched to zosyn (completed on 4/27)  - Toci 4/24

## 2020-04-27 NOTE — PROGRESS NOTE ADULT - ASSESSMENT
75M PMH of DM presented for 1 week of fevers. Patient's wife reported fall 1 week ago hitting head and noted increased fatigue/sleepiness. Found to be COVID+, with hyponatremia at [facility]. Admitted to [facility] for management of hyponatremia and AHRF 2/2 covid, currently on BiPAP 75M PMHx of DM presented for 1 week of fevers. Patient's wife reported fall 1 week ago hitting head and noted increased fatigue/sleepiness. Found to be COVID+, with hyponatremia at [facility]. Admitted to [facility] for management of hyponatremia and AHRF 2/2 covid, currently on BiPAP.

## 2020-04-27 NOTE — CHART NOTE - NSCHARTNOTEFT_GEN_A_CORE
76yo M w/ PMH of DMII, ?sleep apnea (on CPAP o/n outpatient) who initially presented 4/21 for subjective fever x 1 wk, found to be COVID+ and admitted to [facility] for viral PNA 2/2 COVID-9; subsequently stepped up to COVID tele 4/25 for acute hypoxic respiratory failure w/ increased WOB on 15L NRB & increasing lethargy. Of note, patient was saturating well on RA on admission, subsequently requiring 4L NC 4/24, 15L NRB 4/25, HFNC + NRB after transfer to ICU. Initiated CPAP o/n 4/26, with poor patient compliance necessitating sedation. Hospital course c/b hyponatremia, since resolved; head CT completed for recent hx mechanical fall prior to adm, neg for acute pathology. GOC d/w patient and wife 4/26 and he is to remain FULL CODE. Continued on Zosyn (4/24-4/27) for HAP coverage given increasing O2 requirements, leukocytosis, L mid-lung consolidation on CXR. Stepped up to COVID ICU 4/27 for increased monitoring and high risk of intubation. AOx3 at baseline, NPO saturating 92% on CPAP at time of transfer.   - QTc prolonged to 514 4/27 AM s/p haldol x 2 o/n

## 2020-04-27 NOTE — PROGRESS NOTE ADULT - PROBLEM SELECTOR PLAN 9
F: None  E: Replete electrolytes to maintain K>4 and Mg>2  N: DASH/TLC diet F: None  E: Replete electrolytes to maintain K>4 and Mg>2  N: NPO while on bipap

## 2020-04-27 NOTE — PROGRESS NOTE ADULT - ATTENDING COMMENTS
Please refer to CVD fellow note written above for details.  I reviewed the fellow's documentation, and I have personally seen and examined the patient. I have reviewed vitals, labs, medications, cardiac studies and additional imaging. I agree with the findings and plans as written above with the following additions/amendments:       CoVID-19 Patient s/p azithro, plaquenil, tocilizumab with 02 sat 92-94% on CPAP, desat to 86% on high flow NC.  In no distress but understands the need for intubation in case of increased WOB or worsening O2 sat on CPAP. Family informed    Agree with plan as above

## 2020-04-28 LAB
ALBUMIN SERPL ELPH-MCNC: 3.2 G/DL — LOW (ref 3.3–5)
ALP SERPL-CCNC: 54 U/L — SIGNIFICANT CHANGE UP (ref 40–120)
ALT FLD-CCNC: 71 U/L — HIGH (ref 10–45)
ANION GAP SERPL CALC-SCNC: 15 MMOL/L — SIGNIFICANT CHANGE UP (ref 5–17)
AST SERPL-CCNC: 63 U/L — HIGH (ref 10–40)
BASE EXCESS BLDA CALC-SCNC: -0.7 MMOL/L — SIGNIFICANT CHANGE UP (ref -2–3)
BASOPHILS # BLD AUTO: 0 K/UL — SIGNIFICANT CHANGE UP (ref 0–0.2)
BASOPHILS NFR BLD AUTO: 0 % — SIGNIFICANT CHANGE UP (ref 0–2)
BILIRUB SERPL-MCNC: 0.3 MG/DL — SIGNIFICANT CHANGE UP (ref 0.2–1.2)
BLD GP AB SCN SERPL QL: NEGATIVE — SIGNIFICANT CHANGE UP
BUN SERPL-MCNC: 37 MG/DL — HIGH (ref 7–23)
CALCIUM SERPL-MCNC: 9.9 MG/DL — SIGNIFICANT CHANGE UP (ref 8.4–10.5)
CHLORIDE SERPL-SCNC: 104 MMOL/L — SIGNIFICANT CHANGE UP (ref 96–108)
CK SERPL-CCNC: 84 U/L — SIGNIFICANT CHANGE UP (ref 30–200)
CO2 SERPL-SCNC: 23 MMOL/L — SIGNIFICANT CHANGE UP (ref 22–31)
CREAT SERPL-MCNC: 1.59 MG/DL — HIGH (ref 0.5–1.3)
CRP SERPL-MCNC: 3.21 MG/DL — HIGH (ref 0–0.4)
D DIMER BLD IA.RAPID-MCNC: 671 NG/ML DDU — HIGH
EOSINOPHIL # BLD AUTO: 0 K/UL — SIGNIFICANT CHANGE UP (ref 0–0.5)
EOSINOPHIL NFR BLD AUTO: 0 % — SIGNIFICANT CHANGE UP (ref 0–6)
FERRITIN SERPL-MCNC: 1056 NG/ML — HIGH (ref 30–400)
FUNGITELL: <31 PG/ML — SIGNIFICANT CHANGE UP
GLUCOSE BLDC GLUCOMTR-MCNC: 150 MG/DL — HIGH (ref 70–99)
GLUCOSE SERPL-MCNC: 142 MG/DL — HIGH (ref 70–99)
HCO3 BLDA-SCNC: 22 MMOL/L — SIGNIFICANT CHANGE UP (ref 21–28)
HCT VFR BLD CALC: 51 % — HIGH (ref 39–50)
HGB BLD-MCNC: 15.9 G/DL — SIGNIFICANT CHANGE UP (ref 13–17)
LYMPHOCYTES # BLD AUTO: 0.92 K/UL — LOW (ref 1–3.3)
LYMPHOCYTES # BLD AUTO: 18 % — SIGNIFICANT CHANGE UP (ref 13–44)
MAGNESIUM SERPL-MCNC: 2.3 MG/DL — SIGNIFICANT CHANGE UP (ref 1.6–2.6)
MCHC RBC-ENTMCNC: 30.9 PG — SIGNIFICANT CHANGE UP (ref 27–34)
MCHC RBC-ENTMCNC: 31.2 GM/DL — LOW (ref 32–36)
MCV RBC AUTO: 99 FL — SIGNIFICANT CHANGE UP (ref 80–100)
MONOCYTES # BLD AUTO: 0.14 K/UL — SIGNIFICANT CHANGE UP (ref 0–0.9)
MONOCYTES NFR BLD AUTO: 2.7 % — SIGNIFICANT CHANGE UP (ref 2–14)
NEUTROPHILS # BLD AUTO: 3.95 K/UL — SIGNIFICANT CHANGE UP (ref 1.8–7.4)
NEUTROPHILS NFR BLD AUTO: 77.5 % — HIGH (ref 43–77)
PCO2 BLDA: 31 MMHG — LOW (ref 35–48)
PH BLDA: 7.46 — HIGH (ref 7.35–7.45)
PHOSPHATE SERPL-MCNC: 2.8 MG/DL — SIGNIFICANT CHANGE UP (ref 2.5–4.5)
PLATELET # BLD AUTO: 233 K/UL — SIGNIFICANT CHANGE UP (ref 150–400)
PO2 BLDA: 56 MMHG — CRITICAL LOW (ref 83–108)
POTASSIUM SERPL-MCNC: 4.2 MMOL/L — SIGNIFICANT CHANGE UP (ref 3.5–5.3)
POTASSIUM SERPL-SCNC: 4.2 MMOL/L — SIGNIFICANT CHANGE UP (ref 3.5–5.3)
PROT SERPL-MCNC: 7.2 G/DL — SIGNIFICANT CHANGE UP (ref 6–8.3)
RBC # BLD: 5.15 M/UL — SIGNIFICANT CHANGE UP (ref 4.2–5.8)
RBC # FLD: 14.9 % — HIGH (ref 10.3–14.5)
RH IG SCN BLD-IMP: POSITIVE — SIGNIFICANT CHANGE UP
SAO2 % BLDA: 88 % — LOW (ref 95–100)
SODIUM SERPL-SCNC: 142 MMOL/L — SIGNIFICANT CHANGE UP (ref 135–145)
WBC # BLD: 5.1 K/UL — SIGNIFICANT CHANGE UP (ref 3.8–10.5)
WBC # FLD AUTO: 5.1 K/UL — SIGNIFICANT CHANGE UP (ref 3.8–10.5)

## 2020-04-28 PROCEDURE — 99291 CRITICAL CARE FIRST HOUR: CPT | Mod: CS

## 2020-04-28 PROCEDURE — 71045 X-RAY EXAM CHEST 1 VIEW: CPT | Mod: 26,CS

## 2020-04-28 RX ORDER — INSULIN LISPRO 100/ML
VIAL (ML) SUBCUTANEOUS EVERY 4 HOURS
Refills: 0 | Status: DISCONTINUED | OUTPATIENT
Start: 2020-04-28 | End: 2020-05-01

## 2020-04-28 RX ORDER — LEVOTHYROXINE SODIUM 125 MCG
100 TABLET ORAL AT BEDTIME
Refills: 0 | Status: DISCONTINUED | OUTPATIENT
Start: 2020-04-28 | End: 2020-05-05

## 2020-04-28 RX ORDER — PIPERACILLIN AND TAZOBACTAM 4; .5 G/20ML; G/20ML
4.5 INJECTION, POWDER, LYOPHILIZED, FOR SOLUTION INTRAVENOUS EVERY 6 HOURS
Refills: 0 | Status: COMPLETED | OUTPATIENT
Start: 2020-04-28 | End: 2020-05-05

## 2020-04-28 RX ORDER — OLANZAPINE 15 MG/1
2.5 TABLET, FILM COATED ORAL ONCE
Refills: 0 | Status: COMPLETED | OUTPATIENT
Start: 2020-04-28 | End: 2020-04-28

## 2020-04-28 RX ADMIN — FAMOTIDINE 20 MILLIGRAM(S): 10 INJECTION INTRAVENOUS at 12:09

## 2020-04-28 RX ADMIN — INSULIN GLARGINE 18 UNIT(S): 100 INJECTION, SOLUTION SUBCUTANEOUS at 22:02

## 2020-04-28 RX ADMIN — Medication 300 MILLIGRAM(S): at 12:09

## 2020-04-28 RX ADMIN — Medication 650 MILLIGRAM(S): at 16:34

## 2020-04-28 RX ADMIN — Medication 650 MILLIGRAM(S): at 22:04

## 2020-04-28 RX ADMIN — Medication 100 MICROGRAM(S): at 23:19

## 2020-04-28 RX ADMIN — Medication 125 MILLIGRAM(S): at 22:14

## 2020-04-28 RX ADMIN — PIPERACILLIN AND TAZOBACTAM 200 GRAM(S): 4; .5 INJECTION, POWDER, LYOPHILIZED, FOR SOLUTION INTRAVENOUS at 17:16

## 2020-04-28 RX ADMIN — ENOXAPARIN SODIUM 40 MILLIGRAM(S): 100 INJECTION SUBCUTANEOUS at 17:15

## 2020-04-28 RX ADMIN — PIPERACILLIN AND TAZOBACTAM 200 GRAM(S): 4; .5 INJECTION, POWDER, LYOPHILIZED, FOR SOLUTION INTRAVENOUS at 22:15

## 2020-04-28 RX ADMIN — DULOXETINE HYDROCHLORIDE 20 MILLIGRAM(S): 30 CAPSULE, DELAYED RELEASE ORAL at 12:09

## 2020-04-28 RX ADMIN — OLANZAPINE 2.5 MILLIGRAM(S): 15 TABLET, FILM COATED ORAL at 03:47

## 2020-04-28 RX ADMIN — PIPERACILLIN AND TAZOBACTAM 200 GRAM(S): 4; .5 INJECTION, POWDER, LYOPHILIZED, FOR SOLUTION INTRAVENOUS at 12:08

## 2020-04-28 RX ADMIN — Medication 2: at 22:00

## 2020-04-28 RX ADMIN — Medication 1000 UNIT(S): at 19:19

## 2020-04-28 RX ADMIN — ATORVASTATIN CALCIUM 20 MILLIGRAM(S): 80 TABLET, FILM COATED ORAL at 22:01

## 2020-04-28 RX ADMIN — Medication 125 MILLIGRAM(S): at 17:16

## 2020-04-28 NOTE — PROGRESS NOTE ADULT - SUBJECTIVE AND OBJECTIVE BOX
S: No new issues/events overnight, no new med c/o    O: ICU Vital Signs Last 24 Hrs  T(F): 97.4 (04-28-20 @ 09:00), Max: 98.3 (04-27-20 @ 20:31)  HR: 75 (04-28-20 @ 10:56) (68 - 86)  BP: 140/76 (04-28-20 @ 07:45) (125/61 - 144/78)  BP(mean): 99 (04-28-20 @ 07:45) (88 - 103)  ABP: --  RR: 27 (04-28-20 @ 10:56) (27 - 35)  SpO2: 95% (04-28-20 @ 10:56) (91% - 96%)      PHYSICAL EXAM:   Neurological: aaox3  ENT: mucus membrane moist  Cardiovascular: RRR  Respiratory: CTA  Gastrointestinal: soft, ND, BS+  Extremities: warm, no dependent edema  Vascular: no cyanosis/erythema  Skin: no rashes  MSK: no joint swelling    LABS:    04-28    142  |  104  |  37<H>  ----------------------------<  142<H>  4.2   |  23  |  1.59<H>    Ca    9.9      28 Apr 2020 05:09  Phos  2.8     04-28  Mg     2.3     04-28    TPro  7.2  /  Alb  3.2<L>  /  TBili  0.3  /  DBili  x   /  AST  63<H>  /  ALT  71<H>  /  AlkPhos  54  04-28  LIVER FUNCTIONS - ( 28 Apr 2020 05:09 )  Alb: 3.2 g/dL / Pro: 7.2 g/dL / ALK PHOS: 54 U/L / ALT: 71 U/L / AST: 63 U/L / GGT: x                               15.9   5.10  )-----------( 233      ( 28 Apr 2020 05:09 )             51.0   CARDIAC MARKERS ( 28 Apr 2020 05:09 )  x     / x     / 84 U/L / x     / x      CARDIAC MARKERS ( 27 Apr 2020 07:11 )  x     / x     / 110 U/L / x     / x        ABG - ( 28 Apr 2020 11:19 )  pH, Arterial: 7.46  pH, Blood: x     /  pCO2: 31    /  pO2: 56    / HCO3: 22    / Base Excess: -0.7  /  SaO2: 88              CAPILLARY BLOOD GLUCOSE      POCT Blood Glucose.: 140 mg/dL (28 Apr 2020 12:02)  POCT Blood Glucose.: 150 mg/dL (28 Apr 2020 05:51)  POCT Blood Glucose.: 157 mg/dL (27 Apr 2020 22:41)  POCT Blood Glucose.: 170 mg/dL (27 Apr 2020 17:01)      MEDICATIONS  (STANDING):  allopurinol 300 milliGRAM(s) Oral daily  amLODIPine   Tablet 5 milliGRAM(s) Oral daily  atorvastatin 20 milliGRAM(s) Oral at bedtime  cholecalciferol 1000 Unit(s) Oral every 24 hours  DULoxetine 20 milliGRAM(s) Oral daily  enoxaparin Injectable 40 milliGRAM(s) SubCutaneous every 24 hours  famotidine    Tablet 20 milliGRAM(s) Oral daily  insulin glargine Injectable (LANTUS) 18 Unit(s) SubCutaneous at bedtime  insulin lispro (HumaLOG) corrective regimen sliding scale   SubCutaneous Before meals and at bedtime  levothyroxine Injectable 100 MICROGram(s) IV Push at bedtime  piperacillin/tazobactam IVPB.. 4.5 Gram(s) IV Intermittent every 6 hours  sodium bicarbonate 650 milliGRAM(s) Oral three times a day    MEDICATIONS  (PRN):  acetaminophen   Tablet .. 650 milliGRAM(s) Oral every 6 hours PRN Temp greater or equal to 38C (100.4F)  dextrose 40% Gel 15 Gram(s) Oral once PRN Blood Glucose LESS THAN 70 milliGRAM(s)/deciliter  glucagon  Injectable 1 milliGRAM(s) IntraMuscular once PRN Glucose LESS THAN 70 milligrams/deciliter      Tim:	  [ ] None	[ x] Daily Tim Order Placed	   Indication:	  [x ] Strict I and O's    [ ] Obstruction     [ ] Incontinence + Stage 3 or 4 Decubitus  Central Line:  [x ] None	   [ ]  Medication / TPN Administration     [ ] No Peripheral IV

## 2020-04-28 NOTE — PROGRESS NOTE ADULT - ASSESSMENT
75M PMH of DM admit 4/21, progressive hypoxia, transfer to MICU     Neuro: Tylenol, Cymbalta, Zyprexa PO, and IV prn agitation  CV: Norvasc, Statin, Held: Losartan  Pulm: did well on BiPAP overnight, wean to HFNC 100%/40L, awake prone today.   GI:NPO for now until resp status stable., Pepcid  : Tim, cont home NaBicarb PO.   ID: HAP: zosyn (4/22-4/27, 4/28--) ///dc:  Cefdopoxime/Zosyn (4/22-4/27), Plaquenil (4/21-4/25), Azithro (4/2104/25), Toci 4/23  Endo: ISS, Lantus 18u, Allopurinol, Synthroid   PPx: SQL, Doppler 4/27 neg  Lines: PIV  Wounds: None  PT/OT: Not ordered  Dispo: MICU, Full Code

## 2020-04-29 LAB
ALBUMIN SERPL ELPH-MCNC: 3.2 G/DL — LOW (ref 3.3–5)
ALP SERPL-CCNC: 49 U/L — SIGNIFICANT CHANGE UP (ref 40–120)
ALT FLD-CCNC: 70 U/L — HIGH (ref 10–45)
ANION GAP SERPL CALC-SCNC: 17 MMOL/L — SIGNIFICANT CHANGE UP (ref 5–17)
AST SERPL-CCNC: 68 U/L — HIGH (ref 10–40)
BASE EXCESS BLDA CALC-SCNC: -3.2 MMOL/L — LOW (ref -2–3)
BASOPHILS # BLD AUTO: 0 K/UL — SIGNIFICANT CHANGE UP (ref 0–0.2)
BASOPHILS NFR BLD AUTO: 0 % — SIGNIFICANT CHANGE UP (ref 0–2)
BILIRUB SERPL-MCNC: 0.4 MG/DL — SIGNIFICANT CHANGE UP (ref 0.2–1.2)
BUN SERPL-MCNC: 48 MG/DL — HIGH (ref 7–23)
CALCIUM SERPL-MCNC: 9.7 MG/DL — SIGNIFICANT CHANGE UP (ref 8.4–10.5)
CHLORIDE SERPL-SCNC: 109 MMOL/L — HIGH (ref 96–108)
CO2 SERPL-SCNC: 20 MMOL/L — LOW (ref 22–31)
CREAT SERPL-MCNC: 1.7 MG/DL — HIGH (ref 0.5–1.3)
CRP SERPL-MCNC: 2.39 MG/DL — HIGH (ref 0–0.4)
D DIMER BLD IA.RAPID-MCNC: 775 NG/ML DDU — HIGH
EOSINOPHIL # BLD AUTO: 0 K/UL — SIGNIFICANT CHANGE UP (ref 0–0.5)
EOSINOPHIL NFR BLD AUTO: 0 % — SIGNIFICANT CHANGE UP (ref 0–6)
FERRITIN SERPL-MCNC: 1105 NG/ML — HIGH (ref 30–400)
GLUCOSE SERPL-MCNC: 186 MG/DL — HIGH (ref 70–99)
HCO3 BLDA-SCNC: 21 MMOL/L — SIGNIFICANT CHANGE UP (ref 21–28)
HCT VFR BLD CALC: 46.2 % — SIGNIFICANT CHANGE UP (ref 39–50)
HGB BLD-MCNC: 15.1 G/DL — SIGNIFICANT CHANGE UP (ref 13–17)
LYMPHOCYTES # BLD AUTO: 0.36 K/UL — LOW (ref 1–3.3)
LYMPHOCYTES # BLD AUTO: 6.1 % — LOW (ref 13–44)
MAGNESIUM SERPL-MCNC: 2.4 MG/DL — SIGNIFICANT CHANGE UP (ref 1.6–2.6)
MANUAL SMEAR VERIFICATION: SIGNIFICANT CHANGE UP
MCHC RBC-ENTMCNC: 30.7 PG — SIGNIFICANT CHANGE UP (ref 27–34)
MCHC RBC-ENTMCNC: 32.7 GM/DL — SIGNIFICANT CHANGE UP (ref 32–36)
MCV RBC AUTO: 93.9 FL — SIGNIFICANT CHANGE UP (ref 80–100)
MONOCYTES # BLD AUTO: 0.05 K/UL — SIGNIFICANT CHANGE UP (ref 0–0.9)
MONOCYTES NFR BLD AUTO: 0.9 % — LOW (ref 2–14)
NEUTROPHILS # BLD AUTO: 5.27 K/UL — SIGNIFICANT CHANGE UP (ref 1.8–7.4)
NEUTROPHILS NFR BLD AUTO: 88.7 % — HIGH (ref 43–77)
OVALOCYTES BLD QL SMEAR: SLIGHT — SIGNIFICANT CHANGE UP
PCO2 BLDA: 34 MMHG — LOW (ref 35–48)
PH BLDA: 7.4 — SIGNIFICANT CHANGE UP (ref 7.35–7.45)
PHOSPHATE SERPL-MCNC: 3.8 MG/DL — SIGNIFICANT CHANGE UP (ref 2.5–4.5)
PLAT MORPH BLD: NORMAL — SIGNIFICANT CHANGE UP
PLATELET # BLD AUTO: 324 K/UL — SIGNIFICANT CHANGE UP (ref 150–400)
PO2 BLDA: 72 MMHG — LOW (ref 83–108)
POTASSIUM SERPL-MCNC: 4.3 MMOL/L — SIGNIFICANT CHANGE UP (ref 3.5–5.3)
POTASSIUM SERPL-SCNC: 4.3 MMOL/L — SIGNIFICANT CHANGE UP (ref 3.5–5.3)
PROCALCITONIN SERPL-MCNC: 0.08 NG/ML — SIGNIFICANT CHANGE UP (ref 0.02–0.1)
PROT SERPL-MCNC: 6.7 G/DL — SIGNIFICANT CHANGE UP (ref 6–8.3)
RBC # BLD: 4.92 M/UL — SIGNIFICANT CHANGE UP (ref 4.2–5.8)
RBC # FLD: 14.9 % — HIGH (ref 10.3–14.5)
RBC BLD AUTO: ABNORMAL
SAO2 % BLDA: 94 % — LOW (ref 95–100)
SODIUM SERPL-SCNC: 146 MMOL/L — HIGH (ref 135–145)
VARIANT LYMPHS # BLD: 4.3 % — SIGNIFICANT CHANGE UP (ref 0–6)
WBC # BLD: 5.94 K/UL — SIGNIFICANT CHANGE UP (ref 3.8–10.5)
WBC # FLD AUTO: 5.94 K/UL — SIGNIFICANT CHANGE UP (ref 3.8–10.5)

## 2020-04-29 PROCEDURE — 99291 CRITICAL CARE FIRST HOUR: CPT | Mod: CS

## 2020-04-29 PROCEDURE — 71045 X-RAY EXAM CHEST 1 VIEW: CPT | Mod: 26,CS

## 2020-04-29 RX ORDER — SODIUM CHLORIDE 9 MG/ML
1000 INJECTION, SOLUTION INTRAVENOUS
Refills: 0 | Status: DISCONTINUED | OUTPATIENT
Start: 2020-04-29 | End: 2020-04-30

## 2020-04-29 RX ADMIN — Medication 1000 UNIT(S): at 17:04

## 2020-04-29 RX ADMIN — Medication 300 MILLIGRAM(S): at 11:56

## 2020-04-29 RX ADMIN — INSULIN GLARGINE 18 UNIT(S): 100 INJECTION, SOLUTION SUBCUTANEOUS at 22:14

## 2020-04-29 RX ADMIN — PIPERACILLIN AND TAZOBACTAM 200 GRAM(S): 4; .5 INJECTION, POWDER, LYOPHILIZED, FOR SOLUTION INTRAVENOUS at 06:06

## 2020-04-29 RX ADMIN — Medication 2: at 06:04

## 2020-04-29 RX ADMIN — Medication 125 MILLIGRAM(S): at 17:05

## 2020-04-29 RX ADMIN — FAMOTIDINE 20 MILLIGRAM(S): 10 INJECTION INTRAVENOUS at 12:40

## 2020-04-29 RX ADMIN — Medication 650 MILLIGRAM(S): at 14:18

## 2020-04-29 RX ADMIN — Medication 125 MILLIGRAM(S): at 22:15

## 2020-04-29 RX ADMIN — Medication 125 MILLIGRAM(S): at 06:05

## 2020-04-29 RX ADMIN — Medication 650 MILLIGRAM(S): at 22:14

## 2020-04-29 RX ADMIN — AMLODIPINE BESYLATE 5 MILLIGRAM(S): 2.5 TABLET ORAL at 06:05

## 2020-04-29 RX ADMIN — Medication 2: at 03:10

## 2020-04-29 RX ADMIN — DULOXETINE HYDROCHLORIDE 20 MILLIGRAM(S): 30 CAPSULE, DELAYED RELEASE ORAL at 11:56

## 2020-04-29 RX ADMIN — ENOXAPARIN SODIUM 40 MILLIGRAM(S): 100 INJECTION SUBCUTANEOUS at 17:03

## 2020-04-29 RX ADMIN — PIPERACILLIN AND TAZOBACTAM 200 GRAM(S): 4; .5 INJECTION, POWDER, LYOPHILIZED, FOR SOLUTION INTRAVENOUS at 22:16

## 2020-04-29 RX ADMIN — Medication 650 MILLIGRAM(S): at 06:05

## 2020-04-29 RX ADMIN — Medication 125 MILLIGRAM(S): at 11:56

## 2020-04-29 RX ADMIN — Medication 1 TABLET(S): at 17:03

## 2020-04-29 RX ADMIN — Medication 2: at 17:04

## 2020-04-29 RX ADMIN — PIPERACILLIN AND TAZOBACTAM 200 GRAM(S): 4; .5 INJECTION, POWDER, LYOPHILIZED, FOR SOLUTION INTRAVENOUS at 17:05

## 2020-04-29 RX ADMIN — PIPERACILLIN AND TAZOBACTAM 200 GRAM(S): 4; .5 INJECTION, POWDER, LYOPHILIZED, FOR SOLUTION INTRAVENOUS at 11:57

## 2020-04-29 RX ADMIN — Medication 100 MICROGRAM(S): at 22:14

## 2020-04-29 RX ADMIN — ATORVASTATIN CALCIUM 20 MILLIGRAM(S): 80 TABLET, FILM COATED ORAL at 22:15

## 2020-04-29 RX ADMIN — SODIUM CHLORIDE 70 MILLILITER(S): 9 INJECTION, SOLUTION INTRAVENOUS at 09:48

## 2020-04-29 RX ADMIN — Medication 2: at 22:15

## 2020-04-29 RX ADMIN — Medication 4: at 14:33

## 2020-04-29 NOTE — PROGRESS NOTE ADULT - SUBJECTIVE AND OBJECTIVE BOX
**Incomplete Note**    OVERNIGHT EVENTS:    SUBJECTIVE / INTERVAL HPI: Patient seen and examined at bedside.     VITAL SIGNS:  Vital Signs Last 24 Hrs  T(C): 36.2 (29 Apr 2020 14:46), Max: 36.7 (28 Apr 2020 22:58)  T(F): 97.1 (29 Apr 2020 14:46), Max: 98 (28 Apr 2020 22:58)  HR: 61 (29 Apr 2020 14:00) (58 - 101)  BP: 139/77 (29 Apr 2020 14:00) (104/67 - 151/74)  BP(mean): 83 (29 Apr 2020 11:00) (83 - 112)  RR: 22 (29 Apr 2020 14:00) (14 - 32)  SpO2: 89% (29 Apr 2020 14:00) (88% - 100%)    PHYSICAL EXAM:    General: WDWN  HEENT: NC/AT; PERRL, anicteric sclera; MMM  Neck: supple  Cardiovascular: +S1/S2; RRR  Respiratory: CTA B/L; no W/R/R  Gastrointestinal: soft, NT/ND; +BSx4  Extremities: WWP; no edema, clubbing or cyanosis  Vascular: 2+ radial, DP/PT pulses B/L  Neurological: AAOx3; no focal deficits    MEDICATIONS:  MEDICATIONS  (STANDING):  allopurinol 300 milliGRAM(s) Oral daily  atorvastatin 20 milliGRAM(s) Oral at bedtime  cholecalciferol 1000 Unit(s) Oral every 24 hours  dextrose 5%. 1000 milliLiter(s) (50 mL/Hr) IV Continuous <Continuous>  dextrose 50% Injectable 12.5 Gram(s) IV Push once  dextrose 50% Injectable 25 Gram(s) IV Push once  dextrose 50% Injectable 25 Gram(s) IV Push once  DULoxetine 20 milliGRAM(s) Oral daily  enoxaparin Injectable 40 milliGRAM(s) SubCutaneous every 24 hours  famotidine    Tablet 20 milliGRAM(s) Oral daily  insulin glargine Injectable (LANTUS) 18 Unit(s) SubCutaneous at bedtime  insulin lispro (HumaLOG) corrective regimen sliding scale   SubCutaneous every 4 hours  lactated ringers. 1000 milliLiter(s) (70 mL/Hr) IV Continuous <Continuous>  levothyroxine Injectable 100 MICROGram(s) IV Push at bedtime  methylPREDNISolone sodium succinate Injectable 125 milliGRAM(s) IV Push every 6 hours  piperacillin/tazobactam IVPB.. 4.5 Gram(s) IV Intermittent every 6 hours  sodium bicarbonate 650 milliGRAM(s) Oral three times a day  trimethoprim  160 mG/sulfamethoxazole 800 mG 1 Tablet(s) Oral <User Schedule>    MEDICATIONS  (PRN):  acetaminophen   Tablet .. 650 milliGRAM(s) Oral every 6 hours PRN Temp greater or equal to 38C (100.4F)  dextrose 40% Gel 15 Gram(s) Oral once PRN Blood Glucose LESS THAN 70 milliGRAM(s)/deciliter  glucagon  Injectable 1 milliGRAM(s) IntraMuscular once PRN Glucose LESS THAN 70 milligrams/deciliter      ALLERGIES:  Allergies    No Known Allergies    Intolerances        LABS:                        15.1   5.94  )-----------( 324      ( 29 Apr 2020 06:02 )             46.2     04-29    146<H>  |  109<H>  |  48<H>  ----------------------------<  186<H>  4.3   |  20<L>  |  1.70<H>    Ca    9.7      29 Apr 2020 06:02  Phos  3.8     04-29  Mg     2.4     04-29    TPro  6.7  /  Alb  3.2<L>  /  TBili  0.4  /  DBili  x   /  AST  68<H>  /  ALT  70<H>  /  AlkPhos  49  04-29        CAPILLARY BLOOD GLUCOSE      POCT Blood Glucose.: 209 mg/dL (29 Apr 2020 14:25)      RADIOLOGY & ADDITIONAL TESTS: Reviewed.    ASSESSMENT:    PLAN: OVERNIGHT EVENTS: No acute events overnight. Afebrile.     SUBJECTIVE / INTERVAL HPI: Patient seen and examined by attending physician.   VITAL SIGNS:  Vital Signs Last 24 Hrs  T(C): 36.2 (29 Apr 2020 14:46), Max: 36.7 (28 Apr 2020 22:58)  T(F): 97.1 (29 Apr 2020 14:46), Max: 98 (28 Apr 2020 22:58)  HR: 61 (29 Apr 2020 14:00) (58 - 101)  BP: 139/77 (29 Apr 2020 14:00) (104/67 - 151/74)  BP(mean): 83 (29 Apr 2020 11:00) (83 - 112)  RR: 22 (29 Apr 2020 14:00) (14 - 32)  SpO2: 89% (29 Apr 2020 14:00) (88% - 100%)      MEDICATIONS:  MEDICATIONS  (STANDING):  allopurinol 300 milliGRAM(s) Oral daily  atorvastatin 20 milliGRAM(s) Oral at bedtime  cholecalciferol 1000 Unit(s) Oral every 24 hours  dextrose 5%. 1000 milliLiter(s) (50 mL/Hr) IV Continuous <Continuous>  dextrose 50% Injectable 12.5 Gram(s) IV Push once  dextrose 50% Injectable 25 Gram(s) IV Push once  dextrose 50% Injectable 25 Gram(s) IV Push once  DULoxetine 20 milliGRAM(s) Oral daily  enoxaparin Injectable 40 milliGRAM(s) SubCutaneous every 24 hours  famotidine    Tablet 20 milliGRAM(s) Oral daily  insulin glargine Injectable (LANTUS) 18 Unit(s) SubCutaneous at bedtime  insulin lispro (HumaLOG) corrective regimen sliding scale   SubCutaneous every 4 hours  lactated ringers. 1000 milliLiter(s) (70 mL/Hr) IV Continuous <Continuous>  levothyroxine Injectable 100 MICROGram(s) IV Push at bedtime  methylPREDNISolone sodium succinate Injectable 125 milliGRAM(s) IV Push every 6 hours  piperacillin/tazobactam IVPB.. 4.5 Gram(s) IV Intermittent every 6 hours  sodium bicarbonate 650 milliGRAM(s) Oral three times a day  trimethoprim  160 mG/sulfamethoxazole 800 mG 1 Tablet(s) Oral <User Schedule>    MEDICATIONS  (PRN):  acetaminophen   Tablet .. 650 milliGRAM(s) Oral every 6 hours PRN Temp greater or equal to 38C (100.4F)  dextrose 40% Gel 15 Gram(s) Oral once PRN Blood Glucose LESS THAN 70 milliGRAM(s)/deciliter  glucagon  Injectable 1 milliGRAM(s) IntraMuscular once PRN Glucose LESS THAN 70 milligrams/deciliter      ALLERGIES:  Allergies    No Known Allergies    Intolerances        LABS:                        15.1   5.94  )-----------( 324      ( 29 Apr 2020 06:02 )             46.2     04-29    146<H>  |  109<H>  |  48<H>  ----------------------------<  186<H>  4.3   |  20<L>  |  1.70<H>    Ca    9.7      29 Apr 2020 06:02  Phos  3.8     04-29  Mg     2.4     04-29    TPro  6.7  /  Alb  3.2<L>  /  TBili  0.4  /  DBili  x   /  AST  68<H>  /  ALT  70<H>  /  AlkPhos  49  04-29        CAPILLARY BLOOD GLUCOSE      POCT Blood Glucose.: 209 mg/dL (29 Apr 2020 14:25)      RADIOLOGY & ADDITIONAL TESTS: Reviewed.    ASSESSMENT:    PLAN:

## 2020-04-29 NOTE — PROGRESS NOTE ADULT - SUBJECTIVE AND OBJECTIVE BOX
Dr. Wright spoke to the Legally Authorized Representative (LAR) (wife:  Wendi Weeks)  to obtain informed consent for enrollment into the study "Expanded Access to Convalescent Plasma for the Treatment of Patients with COVID-19". The informed consent was discussed in its entirety with a witness present, the LAR/(wife:  Wendi Weeks) was given the opportunity to ask questions, and all questions were answered. The LAR/(wife:  Wendi Weeks) understands the alternatives, risks and benefits to and agrees to proceed with the clinical trial. An email address was requested, and the ICF will be sent (via email) to the LAR/(wife:  Wendi Weeks) to be signed and returned. This witness completed the witness attestation form. A fully signed copy of the ICF will be sent to the email address requested by the LAR/(wife:  Wendi Weeks).

## 2020-04-29 NOTE — PROGRESS NOTE ADULT - ATTENDING COMMENTS
75 year old male with h/o DM, never smoker with acute hypoxic respiratory failure with COVID pneumonitis. Currently on BIPAP and high flow.  Plan:  - High flow oxygen, awake proning. Change back to BIPAP during night time  - OOB to chair  - IV solumedrol 125 Q 6hr for 3 days to continue.  - PCP prophylaxis  - high risk for intubation  - Continue Zosyn for aspiration pneumonia.  - I called his wife and updated her about his clinical status.

## 2020-04-29 NOTE — PROGRESS NOTE ADULT - ASSESSMENT
75M PMHx of DM presented for 1 week of fevers. Patient's wife reported fall 1 week ago hitting head and noted increased fatigue/sleepiness. [facility] COVID[facility] for management of hyponatremia and AHRF 2/2 COVID-19, stepped up to ICU for closer monitoring.     NEUROLOGY: LAURENCE   AAOx3, conversive.  -Patient attempting to get out of bed at nighttime, stating he is going home, no known history of dementia.    #Toxic Metabolic Encephalopathy 2/2 hyponatremia RESOLVED   Patient noted progressively worsening confusion, difficulty performing activities of daily living over last week. CT head negative. Associated with hyponatremia  - Avoid sedating medications, gabapentin held  - Hyponatremia, f/u renal recs management as above  - Tx covid infection, Tylenol PRN for fevers   - Improved - back to baseline  - f/u neuro recs    CARDIOVASCULAR  #Hypertension  Patient with history of HTN on home losartan and atenolol  - holding losartan in setting of eulalia and COVID  - holding home norvasc 5 mg    RESPIRATORY   #Acute Hypoxic Respiratory Failure 2/2 COVID-19   Pt with increasing O2 requirements, desaturating on HFNC, requiring BiPAP  - CXR 4/25 unchanged from prior day  - Continue to trend inflammatory markers  - s/p hydroxychloroquine and azithromycin  - Placed on cefpodoxime (4/21) to cover CAP, switched to zosyn (completed on 4/27)  - s/p Toci 4/24  - awake proning  - patient with adequate saturation at 95%     INFECTIOUS DISEASE  #COVID-19 Pneumonia   COVID-19 + 4/21.  -f/u procalcitonin to eval for bacterial infection  -f/u LDH, CRP, ferritin, d-dimer, haptoglobin, CK, G6PD, ESR/CRP, fibrinogen, QuantiFeron  -contact/airborne precaution  -tylenol PRN for fever  -Robitussin PRN for cough  -avoid NSAIDs  -no nebulizers, MDI only  -will hold off on abx for now pending further w/u  -will eval for HIV  -f/u blood cx  -COVID protocol: ascorbic acid, thiamine, azithromycin, famotidine 75M PMHx of DM presented for 1 week of fevers. Patient's wife reported fall 1 week ago hitting head and noted increased fatigue/sleepiness. [facility] for management of hyponatremia and AHRF 2/2 COVID-19, stepped up to ICU for closer monitoring.     NEUROLOGY: LAURENCE   AAOx3, conversive.  -Patient attempting to get out of bed at nighttime, stating he is going home, no known history of dementia.    #Toxic Metabolic Encephalopathy 2/2 hyponatremia RESOLVED   Patient noted progressively worsening confusion, difficulty performing activities of daily living over last week. CT head negative. Associated with hyponatremia  - Avoid sedating medications, gabapentin held  - Hyponatremia, f/u renal recs management as above  - Tx covid infection, Tylenol PRN for fevers   - Improved - back to baseline  - f/u neuro recs    #Neuropathy  Hx of neuropathy on duloxetine and gabapentin  -restarted duloxetine    CARDIOVASCULAR  #Hypertension  Patient with history of HTN on home losartan and atenolol  - holding losartan in setting of liz and COVID  - holding home norvasc 5 mg    RESPIRATORY   #Acute Hypoxic Respiratory Failure 2/2 COVID-19   Pt with increasing O2 requirements, desaturating on HFNC, requiring BiPAP  - CXR 4/25 unchanged from prior day  - Continue to trend inflammatory markers  - s/p hydroxychloroquine and azithromycin  - Placed on cefpodoxime (4/21) to cover CAP, switched to zosyn (completed on 4/27)  - s/p Toci 4/24  - awake proning  - patient with adequate saturation at 95%     INFECTIOUS DISEASE  #COVID-19 Pneumonia   COVID-19 + 4/21.  -f/u procalcitonin to eval for bacterial infection  -f/u LDH, CRP, ferritin, d-dimer, haptoglobin, CK, G6PD, ESR/CRP, fibrinogen, QuantiFeron  -contact/airborne precaution  -tylenol PRN for fever  -Robitussin PRN for cough  -avoid NSAIDs  -no nebulizers, MDI only  - awake proning  - lay on right side     RENAL/METABOLIC   #LIZ   Noted Cr at 1.91, unknown baseline but noted improvement to 1.64 with 3L IVF. Possible COVID component to kidney injury as well.   - urine lytes -> prerenal  - Hold home losartan in setting of LIZ  - avoid nephrotoxic agents    ENDOCRINE:  #Hypothyroidism  Hx of hypothyroidism on synthroid  -c/w home synthroid dose    #Type 2 Diabetes   Hx of DM on basal 28U and premeal 6U, also on statin, ARB  - Mod ISS  - Lantus 18 qHS and hold premeal --> will see requirements from sliding scale  - A1C 6.6   - finger sticks q4 hr     PROPHYLACTIC MEASURE:  F: tolerating PO, no IVF  E: replete K<4, Mg<2  N: NPO in setting of BiPAP     VTE Prophylaxis: Lovenox SubQ  C: Full Code  D: COVID-MICU

## 2020-04-29 NOTE — PROGRESS NOTE ADULT - PROBLEM SELECTOR PLAN 2
Noted hyponatremia to 122, on repeat on arrival to [facility] noted to be 134, patient was initially AAOx1-2, now AAOx3 with mental status improving. Legionella and TSH negative.   -Continue following renal recs  -D5W d/c 4/22. 4/24 Na 134    #AGMA 2/2 starvation ketosis (resolved)  -ketones in urine with BHB of 0.9  -s/p D5 and 1/2 NS drip

## 2020-04-30 LAB
ALBUMIN SERPL ELPH-MCNC: 3 G/DL — LOW (ref 3.3–5)
ALP SERPL-CCNC: 48 U/L — SIGNIFICANT CHANGE UP (ref 40–120)
ALT FLD-CCNC: 61 U/L — HIGH (ref 10–45)
ANION GAP SERPL CALC-SCNC: 15 MMOL/L — SIGNIFICANT CHANGE UP (ref 5–17)
AST SERPL-CCNC: 56 U/L — HIGH (ref 10–40)
BASE EXCESS BLDA CALC-SCNC: 0.4 MMOL/L — SIGNIFICANT CHANGE UP (ref -2–3)
BASOPHILS # BLD AUTO: 0.01 K/UL — SIGNIFICANT CHANGE UP (ref 0–0.2)
BASOPHILS NFR BLD AUTO: 0.1 % — SIGNIFICANT CHANGE UP (ref 0–2)
BILIRUB SERPL-MCNC: 0.4 MG/DL — SIGNIFICANT CHANGE UP (ref 0.2–1.2)
BUN SERPL-MCNC: 47 MG/DL — HIGH (ref 7–23)
CALCIUM SERPL-MCNC: 9.3 MG/DL — SIGNIFICANT CHANGE UP (ref 8.4–10.5)
CHLORIDE SERPL-SCNC: 114 MMOL/L — HIGH (ref 96–108)
CO2 SERPL-SCNC: 22 MMOL/L — SIGNIFICANT CHANGE UP (ref 22–31)
CREAT SERPL-MCNC: 1.62 MG/DL — HIGH (ref 0.5–1.3)
CRP SERPL-MCNC: 1.4 MG/DL — HIGH (ref 0–0.4)
D DIMER BLD IA.RAPID-MCNC: 595 NG/ML DDU — HIGH
EOSINOPHIL # BLD AUTO: 0 K/UL — SIGNIFICANT CHANGE UP (ref 0–0.5)
EOSINOPHIL NFR BLD AUTO: 0 % — SIGNIFICANT CHANGE UP (ref 0–6)
FERRITIN SERPL-MCNC: 1041 NG/ML — HIGH (ref 30–400)
GAS PNL BLDA: SIGNIFICANT CHANGE UP
GLUCOSE SERPL-MCNC: 139 MG/DL — HIGH (ref 70–99)
HCO3 BLDA-SCNC: 24 MMOL/L — SIGNIFICANT CHANGE UP (ref 21–28)
HCT VFR BLD CALC: 42.6 % — SIGNIFICANT CHANGE UP (ref 39–50)
HGB BLD-MCNC: 14 G/DL — SIGNIFICANT CHANGE UP (ref 13–17)
IMM GRANULOCYTES NFR BLD AUTO: 1.1 % — SIGNIFICANT CHANGE UP (ref 0–1.5)
LYMPHOCYTES # BLD AUTO: 0.66 K/UL — LOW (ref 1–3.3)
LYMPHOCYTES # BLD AUTO: 8.1 % — LOW (ref 13–44)
MAGNESIUM SERPL-MCNC: 2.4 MG/DL — SIGNIFICANT CHANGE UP (ref 1.6–2.6)
MCHC RBC-ENTMCNC: 30.9 PG — SIGNIFICANT CHANGE UP (ref 27–34)
MCHC RBC-ENTMCNC: 32.9 GM/DL — SIGNIFICANT CHANGE UP (ref 32–36)
MCV RBC AUTO: 94 FL — SIGNIFICANT CHANGE UP (ref 80–100)
MONOCYTES # BLD AUTO: 0.31 K/UL — SIGNIFICANT CHANGE UP (ref 0–0.9)
MONOCYTES NFR BLD AUTO: 3.8 % — SIGNIFICANT CHANGE UP (ref 2–14)
NEUTROPHILS # BLD AUTO: 7.04 K/UL — SIGNIFICANT CHANGE UP (ref 1.8–7.4)
NEUTROPHILS NFR BLD AUTO: 86.9 % — HIGH (ref 43–77)
NRBC # BLD: 0 /100 WBCS — SIGNIFICANT CHANGE UP (ref 0–0)
PCO2 BLDA: 34 MMHG — LOW (ref 35–48)
PH BLDA: 7.46 — HIGH (ref 7.35–7.45)
PHOSPHATE SERPL-MCNC: 3.9 MG/DL — SIGNIFICANT CHANGE UP (ref 2.5–4.5)
PLATELET # BLD AUTO: 320 K/UL — SIGNIFICANT CHANGE UP (ref 150–400)
PO2 BLDA: 71 MMHG — LOW (ref 83–108)
POTASSIUM SERPL-MCNC: 4 MMOL/L — SIGNIFICANT CHANGE UP (ref 3.5–5.3)
POTASSIUM SERPL-SCNC: 4 MMOL/L — SIGNIFICANT CHANGE UP (ref 3.5–5.3)
PROCALCITONIN SERPL-MCNC: 0.06 NG/ML — SIGNIFICANT CHANGE UP (ref 0.02–0.1)
PROT SERPL-MCNC: 6.2 G/DL — SIGNIFICANT CHANGE UP (ref 6–8.3)
RBC # BLD: 4.53 M/UL — SIGNIFICANT CHANGE UP (ref 4.2–5.8)
RBC # FLD: 14.8 % — HIGH (ref 10.3–14.5)
SAO2 % BLDA: 94 % — LOW (ref 95–100)
SODIUM SERPL-SCNC: 151 MMOL/L — HIGH (ref 135–145)
WBC # BLD: 8.11 K/UL — SIGNIFICANT CHANGE UP (ref 3.8–10.5)
WBC # FLD AUTO: 8.11 K/UL — SIGNIFICANT CHANGE UP (ref 3.8–10.5)

## 2020-04-30 PROCEDURE — 99291 CRITICAL CARE FIRST HOUR: CPT | Mod: CS

## 2020-04-30 RX ORDER — HALOPERIDOL DECANOATE 100 MG/ML
0.5 INJECTION INTRAMUSCULAR ONCE
Refills: 0 | Status: COMPLETED | OUTPATIENT
Start: 2020-04-30 | End: 2020-04-30

## 2020-04-30 RX ORDER — LANOLIN ALCOHOL/MO/W.PET/CERES
10 CREAM (GRAM) TOPICAL AT BEDTIME
Refills: 0 | Status: DISCONTINUED | OUTPATIENT
Start: 2020-04-30 | End: 2020-05-02

## 2020-04-30 RX ORDER — SODIUM CHLORIDE 9 MG/ML
1000 INJECTION, SOLUTION INTRAVENOUS
Refills: 0 | Status: DISCONTINUED | OUTPATIENT
Start: 2020-04-30 | End: 2020-04-30

## 2020-04-30 RX ORDER — QUETIAPINE FUMARATE 200 MG/1
12.5 TABLET, FILM COATED ORAL AT BEDTIME
Refills: 0 | Status: DISCONTINUED | OUTPATIENT
Start: 2020-04-30 | End: 2020-05-01

## 2020-04-30 RX ADMIN — FAMOTIDINE 20 MILLIGRAM(S): 10 INJECTION INTRAVENOUS at 10:53

## 2020-04-30 RX ADMIN — PIPERACILLIN AND TAZOBACTAM 200 GRAM(S): 4; .5 INJECTION, POWDER, LYOPHILIZED, FOR SOLUTION INTRAVENOUS at 06:07

## 2020-04-30 RX ADMIN — Medication 2: at 12:10

## 2020-04-30 RX ADMIN — PIPERACILLIN AND TAZOBACTAM 200 GRAM(S): 4; .5 INJECTION, POWDER, LYOPHILIZED, FOR SOLUTION INTRAVENOUS at 18:30

## 2020-04-30 RX ADMIN — PIPERACILLIN AND TAZOBACTAM 200 GRAM(S): 4; .5 INJECTION, POWDER, LYOPHILIZED, FOR SOLUTION INTRAVENOUS at 22:59

## 2020-04-30 RX ADMIN — INSULIN GLARGINE 18 UNIT(S): 100 INJECTION, SOLUTION SUBCUTANEOUS at 22:58

## 2020-04-30 RX ADMIN — Medication 125 MILLIGRAM(S): at 06:06

## 2020-04-30 RX ADMIN — ATORVASTATIN CALCIUM 20 MILLIGRAM(S): 80 TABLET, FILM COATED ORAL at 21:33

## 2020-04-30 RX ADMIN — Medication 125 MILLIGRAM(S): at 12:10

## 2020-04-30 RX ADMIN — Medication 1000 UNIT(S): at 16:20

## 2020-04-30 RX ADMIN — Medication 650 MILLIGRAM(S): at 16:20

## 2020-04-30 RX ADMIN — DULOXETINE HYDROCHLORIDE 20 MILLIGRAM(S): 30 CAPSULE, DELAYED RELEASE ORAL at 10:52

## 2020-04-30 RX ADMIN — Medication 4: at 16:18

## 2020-04-30 RX ADMIN — Medication 650 MILLIGRAM(S): at 21:33

## 2020-04-30 RX ADMIN — PIPERACILLIN AND TAZOBACTAM 200 GRAM(S): 4; .5 INJECTION, POWDER, LYOPHILIZED, FOR SOLUTION INTRAVENOUS at 12:11

## 2020-04-30 RX ADMIN — Medication 10 MILLIGRAM(S): at 03:45

## 2020-04-30 RX ADMIN — SODIUM CHLORIDE 70 MILLILITER(S): 9 INJECTION, SOLUTION INTRAVENOUS at 03:46

## 2020-04-30 RX ADMIN — Medication 10 MILLIGRAM(S): at 21:31

## 2020-04-30 RX ADMIN — Medication 100 MICROGRAM(S): at 22:59

## 2020-04-30 RX ADMIN — Medication 2: at 01:49

## 2020-04-30 RX ADMIN — Medication 650 MILLIGRAM(S): at 06:07

## 2020-04-30 RX ADMIN — QUETIAPINE FUMARATE 12.5 MILLIGRAM(S): 200 TABLET, FILM COATED ORAL at 22:59

## 2020-04-30 RX ADMIN — Medication 300 MILLIGRAM(S): at 12:09

## 2020-04-30 RX ADMIN — Medication 125 MILLIGRAM(S): at 18:00

## 2020-04-30 RX ADMIN — Medication 4: at 22:58

## 2020-04-30 RX ADMIN — HALOPERIDOL DECANOATE 0.5 MILLIGRAM(S): 100 INJECTION INTRAMUSCULAR at 21:32

## 2020-04-30 RX ADMIN — Medication 125 MILLIGRAM(S): at 22:59

## 2020-04-30 NOTE — SWALLOW BEDSIDE ASSESSMENT ADULT - SWALLOW EVAL: DIAGNOSIS
Suspect at least a mild pharyngeal dysphagia AEB reported & observed cough/throat clear with small trials of thin liquids, suggestive of aspiration.

## 2020-04-30 NOTE — PROGRESS NOTE ADULT - ATTENDING COMMENTS
75 year old male with h/o DM, never smoker with acute hypoxic respiratory failure with COVID pneumonitis. Currently on BIPAP and high flow.  Plan:  - High flow oxygen, awake proning. Change back to BIPAP during night time  - Patient has delerium during night time. Seroquel added for tonight.   - OOB to chair  - IV solumedrol 125 Q 6hr for 3 days to continue.  - PCP prophylaxis  - high risk for intubation  - Continue Zosyn for aspiration pneumonia.  - Patient is not in resp distress. Still requiring high flow oxygen but appears to be less tachypneic.

## 2020-04-30 NOTE — SWALLOW BEDSIDE ASSESSMENT ADULT - COMMENTS
Per RN, Pt is coughing with meds and small sips of water since being stepped up to ICU. Pt received OOB to chair, awake & alert, Ox3, but ornery. Requires max encouragement to participate in oral motor exam. Voice is hypophonic & dysphonic at baseline.

## 2020-04-30 NOTE — SWALLOW BEDSIDE ASSESSMENT ADULT - SLP PERTINENT HISTORY OF CURRENT PROBLEM
75 year old male with h/o DM, never smoker with acute hypoxic respiratory failure with COVID pneumonitis.

## 2020-04-30 NOTE — SWALLOW BEDSIDE ASSESSMENT ADULT - NS SPL SWALLOW CLINIC TRIAL FT
Pt also noted to have O2 desat to 84-87% intermittently throughout assessment, worse after PO intake further placing him at increased risk of aspiration. He will require careful hand-feeding with modified texture diet to reduce aspiration risk.

## 2020-04-30 NOTE — SWALLOW BEDSIDE ASSESSMENT ADULT - PHARYNGEAL PHASE
Hyolaryngeal excursion palpated during swallow trigger, suspect delay in trigger resulting in +cough with water trials indicating aspiration. No overt cough with remaining textures.

## 2020-04-30 NOTE — PROGRESS NOTE ADULT - SUBJECTIVE AND OBJECTIVE BOX
OVERNIGHT EVENTS: Patient agitated and sundowning, pulling off BiPAP.     SUBJECTIVE / INTERVAL HPI: Patient seen and examined by attending physician.     VITAL SIGNS:  Vital Signs Last 24 Hrs  T(C): 35.5 (30 Apr 2020 14:00), Max: 36.8 (29 Apr 2020 22:50)  T(F): 95.9 (30 Apr 2020 14:00), Max: 98.2 (29 Apr 2020 22:50)  HR: 58 (30 Apr 2020 15:40) (50 - 70)  BP: 148/70 (30 Apr 2020 15:40) (127/62 - 165/75)  BP(mean): 101 (30 Apr 2020 15:40) (84 - 106)  RR: 23 (30 Apr 2020 15:40) (22 - 26)  SpO2: 92% (30 Apr 2020 15:40) (82% - 100%)    MEDICATIONS:  MEDICATIONS  (STANDING):  allopurinol 300 milliGRAM(s) Oral daily  atorvastatin 20 milliGRAM(s) Oral at bedtime  cholecalciferol 1000 Unit(s) Oral every 24 hours  dextrose 5%. 1000 milliLiter(s) (50 mL/Hr) IV Continuous <Continuous>  dextrose 50% Injectable 12.5 Gram(s) IV Push once  dextrose 50% Injectable 25 Gram(s) IV Push once  dextrose 50% Injectable 25 Gram(s) IV Push once  DULoxetine 20 milliGRAM(s) Oral daily  enoxaparin Injectable 40 milliGRAM(s) SubCutaneous every 24 hours  famotidine    Tablet 20 milliGRAM(s) Oral daily  insulin glargine Injectable (LANTUS) 18 Unit(s) SubCutaneous at bedtime  insulin lispro (HumaLOG) corrective regimen sliding scale   SubCutaneous every 4 hours  levothyroxine Injectable 100 MICROGram(s) IV Push at bedtime  melatonin 10 milliGRAM(s) Oral at bedtime  methylPREDNISolone sodium succinate Injectable 125 milliGRAM(s) IV Push every 6 hours  piperacillin/tazobactam IVPB.. 4.5 Gram(s) IV Intermittent every 6 hours  QUEtiapine 12.5 milliGRAM(s) Oral at bedtime  sodium bicarbonate 650 milliGRAM(s) Oral three times a day  trimethoprim  160 mG/sulfamethoxazole 800 mG 1 Tablet(s) Oral <User Schedule>    MEDICATIONS  (PRN):  acetaminophen   Tablet .. 650 milliGRAM(s) Oral every 6 hours PRN Temp greater or equal to 38C (100.4F)  dextrose 40% Gel 15 Gram(s) Oral once PRN Blood Glucose LESS THAN 70 milliGRAM(s)/deciliter  glucagon  Injectable 1 milliGRAM(s) IntraMuscular once PRN Glucose LESS THAN 70 milligrams/deciliter      ALLERGIES:  Allergies    No Known Allergies    Intolerances        LABS:                        14.0   8.11  )-----------( 320      ( 30 Apr 2020 06:16 )             42.6     04-30    151<H>  |  114<H>  |  47<H>  ----------------------------<  139<H>  4.0   |  22  |  1.62<H>    Ca    9.3      30 Apr 2020 06:16  Phos  3.9     04-30  Mg     2.4     04-30    TPro  6.2  /  Alb  3.0<L>  /  TBili  0.4  /  DBili  x   /  AST  56<H>  /  ALT  61<H>  /  AlkPhos  48  04-30        CAPILLARY BLOOD GLUCOSE      POCT Blood Glucose.: 234 mg/dL (30 Apr 2020 16:14)      RADIOLOGY & ADDITIONAL TESTS: Reviewed.    ASSESSMENT:    PLAN:

## 2020-04-30 NOTE — PROGRESS NOTE ADULT - ASSESSMENT
75M PMHx of DM presented for 1 week of fevers. Patient's wife reported fall 1 week ago hitting head and noted increased fatigue/sleepiness. [facility] management of hyponatremia and AHRF 2/2 COVID-19, stepped up to ICU for closer monitoring.     NEUROLOGY: LAURENCE   AAOx3, conversive.  -Patient attempting to get out of bed at nighttime, stating he is going home, no known history of dementia.    #Toxic Metabolic Encephalopathy 2/2 hyponatremia RESOLVED   Patient noted progressively worsening confusion, difficulty performing activities of daily living over last week. CT head negative. Associated with hyponatremia  - Avoid sedating medications, gabapentin held  - Hyponatremia, f/u renal recs management as above  - Tx covid infection, Tylenol PRN for fevers   - Improved - back to baseline  - f/u neuro recs    #Neuropathy  Hx of neuropathy on duloxetine and gabapentin  -restarted duloxetine    #Delirium  Patient noted to be sundowning and often pulling off mask at nighttime.   - seroquel 12.5 at bedtime.   - monitor QTc    CARDIOVASCULAR  #Hypertension  Patient with history of HTN on home losartan and atenolol  - holding losartan in setting of liz and COVID  - holding home norvasc 5 mg    RESPIRATORY   #Acute Hypoxic Respiratory Failure 2/2 COVID-19   Pt with increasing O2 requirements, desaturating on HFNC, requiring BiPAP  - CXR 4/25 unchanged from prior day  - Continue to trend inflammatory markers  - s/p hydroxychloroquine and azithromycin  - Placed on cefpodoxime (4/21) to cover CAP, switched to zosyn (completed on 4/27)  - s/p Toci 4/24  - awake proning/laying on right side   - HFNC during day, BiPAP at night    INFECTIOUS DISEASE  #COVID-19 Pneumonia   COVID-19 + 4/21.  -f/u procalcitonin to eval for bacterial infection  -f/u LDH, CRP, ferritin, d-dimer, haptoglobin, CK, G6PD, ESR/CRP, fibrinogen, QuantiFeron  -contact/airborne precaution  -tylenol PRN for fever  -Robitussin PRN for cough  -avoid NSAIDs  -no nebulizers, MDI only  - awake proning  - lay on right side     RENAL/METABOLIC   #LIZ   Noted Cr at 1.91, unknown baseline but noted improvement to 1.64 with 3L IVF. Possible COVID component to kidney injury as well.   - urine lytes -> prerenal  - Hold home losartan in setting of LIZ  - avoid nephrotoxic agents    GI:  - Protonix 40mg for prophylaxis    #Dysphagia  Failed bedside dysphagia.  - speech and swallow w/ recommendation of puree/nectar thick   - hold PM feed for BiPAP     ENDOCRINE:  #Hypothyroidism  Hx of hypothyroidism on synthroid  -c/w home synthroid dose    #Type 2 Diabetes   Hx of DM on basal 28U and premeal 6U, also on statin, ARB  - Mod ISS  - Lantus 18 qHS and hold premeal --> will see requirements from sliding scale  - A1C 6.6   - finger sticks q4 hr     PROPHYLACTIC MEASURE:  F: tolerating PO, no IVF  E: replete K<4, Mg<2  N: NPO in setting of BiPAP     VTE Prophylaxis: Lovenox SubQ  C: Full Code  D: COVID-MICU

## 2020-05-01 DIAGNOSIS — R41.0 DISORIENTATION, UNSPECIFIED: ICD-10-CM

## 2020-05-01 DIAGNOSIS — F32.9 MAJOR DEPRESSIVE DISORDER, SINGLE EPISODE, UNSPECIFIED: ICD-10-CM

## 2020-05-01 DIAGNOSIS — G62.9 POLYNEUROPATHY, UNSPECIFIED: ICD-10-CM

## 2020-05-01 LAB
ALBUMIN SERPL ELPH-MCNC: 2.8 G/DL — LOW (ref 3.3–5)
ALP SERPL-CCNC: 49 U/L — SIGNIFICANT CHANGE UP (ref 40–120)
ALT FLD-CCNC: 58 U/L — HIGH (ref 10–45)
ANION GAP SERPL CALC-SCNC: 14 MMOL/L — SIGNIFICANT CHANGE UP (ref 5–17)
AST SERPL-CCNC: 49 U/L — HIGH (ref 10–40)
BASOPHILS # BLD AUTO: 0.01 K/UL — SIGNIFICANT CHANGE UP (ref 0–0.2)
BASOPHILS NFR BLD AUTO: 0.2 % — SIGNIFICANT CHANGE UP (ref 0–2)
BILIRUB SERPL-MCNC: 0.4 MG/DL — SIGNIFICANT CHANGE UP (ref 0.2–1.2)
BUN SERPL-MCNC: 45 MG/DL — HIGH (ref 7–23)
CALCIUM SERPL-MCNC: 9.5 MG/DL — SIGNIFICANT CHANGE UP (ref 8.4–10.5)
CHLORIDE SERPL-SCNC: 117 MMOL/L — HIGH (ref 96–108)
CO2 SERPL-SCNC: 22 MMOL/L — SIGNIFICANT CHANGE UP (ref 22–31)
CREAT SERPL-MCNC: 1.42 MG/DL — HIGH (ref 0.5–1.3)
CRP SERPL-MCNC: 0.92 MG/DL — HIGH (ref 0–0.4)
D DIMER BLD IA.RAPID-MCNC: 2355 NG/ML DDU — HIGH
EOSINOPHIL # BLD AUTO: 0 K/UL — SIGNIFICANT CHANGE UP (ref 0–0.5)
EOSINOPHIL NFR BLD AUTO: 0 % — SIGNIFICANT CHANGE UP (ref 0–6)
FERRITIN SERPL-MCNC: 973 NG/ML — HIGH (ref 30–400)
GLUCOSE SERPL-MCNC: 172 MG/DL — HIGH (ref 70–99)
HCT VFR BLD CALC: 44.2 % — SIGNIFICANT CHANGE UP (ref 39–50)
HGB BLD-MCNC: 14.3 G/DL — SIGNIFICANT CHANGE UP (ref 13–17)
IMM GRANULOCYTES NFR BLD AUTO: 1.1 % — SIGNIFICANT CHANGE UP (ref 0–1.5)
LYMPHOCYTES # BLD AUTO: 0.43 K/UL — LOW (ref 1–3.3)
LYMPHOCYTES # BLD AUTO: 6.5 % — LOW (ref 13–44)
MAGNESIUM SERPL-MCNC: 2.6 MG/DL — SIGNIFICANT CHANGE UP (ref 1.6–2.6)
MCHC RBC-ENTMCNC: 30.8 PG — SIGNIFICANT CHANGE UP (ref 27–34)
MCHC RBC-ENTMCNC: 32.4 GM/DL — SIGNIFICANT CHANGE UP (ref 32–36)
MCV RBC AUTO: 95.3 FL — SIGNIFICANT CHANGE UP (ref 80–100)
MONOCYTES # BLD AUTO: 0.29 K/UL — SIGNIFICANT CHANGE UP (ref 0–0.9)
MONOCYTES NFR BLD AUTO: 4.4 % — SIGNIFICANT CHANGE UP (ref 2–14)
NEUTROPHILS # BLD AUTO: 5.79 K/UL — SIGNIFICANT CHANGE UP (ref 1.8–7.4)
NEUTROPHILS NFR BLD AUTO: 87.8 % — HIGH (ref 43–77)
NRBC # BLD: 0 /100 WBCS — SIGNIFICANT CHANGE UP (ref 0–0)
PHOSPHATE SERPL-MCNC: 4 MG/DL — SIGNIFICANT CHANGE UP (ref 2.5–4.5)
PLATELET # BLD AUTO: 308 K/UL — SIGNIFICANT CHANGE UP (ref 150–400)
POTASSIUM SERPL-MCNC: 4.8 MMOL/L — SIGNIFICANT CHANGE UP (ref 3.5–5.3)
POTASSIUM SERPL-SCNC: 4.8 MMOL/L — SIGNIFICANT CHANGE UP (ref 3.5–5.3)
PROCALCITONIN SERPL-MCNC: 0.05 NG/ML — SIGNIFICANT CHANGE UP (ref 0.02–0.1)
PROT SERPL-MCNC: 6 G/DL — SIGNIFICANT CHANGE UP (ref 6–8.3)
RBC # BLD: 4.64 M/UL — SIGNIFICANT CHANGE UP (ref 4.2–5.8)
RBC # FLD: 15 % — HIGH (ref 10.3–14.5)
SODIUM SERPL-SCNC: 153 MMOL/L — HIGH (ref 135–145)
WBC # BLD: 6.59 K/UL — SIGNIFICANT CHANGE UP (ref 3.8–10.5)
WBC # FLD AUTO: 6.59 K/UL — SIGNIFICANT CHANGE UP (ref 3.8–10.5)

## 2020-05-01 PROCEDURE — 99291 CRITICAL CARE FIRST HOUR: CPT | Mod: CS

## 2020-05-01 PROCEDURE — 93970 EXTREMITY STUDY: CPT | Mod: 26

## 2020-05-01 PROCEDURE — 99223 1ST HOSP IP/OBS HIGH 75: CPT | Mod: 95

## 2020-05-01 RX ORDER — SENNA PLUS 8.6 MG/1
2 TABLET ORAL AT BEDTIME
Refills: 0 | Status: DISCONTINUED | OUTPATIENT
Start: 2020-05-01 | End: 2020-05-11

## 2020-05-01 RX ORDER — INSULIN LISPRO 100/ML
VIAL (ML) SUBCUTANEOUS
Refills: 0 | Status: DISCONTINUED | OUTPATIENT
Start: 2020-05-01 | End: 2020-05-11

## 2020-05-01 RX ORDER — POLYETHYLENE GLYCOL 3350 17 G/17G
17 POWDER, FOR SOLUTION ORAL DAILY
Refills: 0 | Status: DISCONTINUED | OUTPATIENT
Start: 2020-05-01 | End: 2020-05-11

## 2020-05-01 RX ORDER — PANTOPRAZOLE SODIUM 20 MG/1
40 TABLET, DELAYED RELEASE ORAL
Refills: 0 | Status: DISCONTINUED | OUTPATIENT
Start: 2020-05-01 | End: 2020-05-05

## 2020-05-01 RX ORDER — SODIUM CHLORIDE 9 MG/ML
1000 INJECTION, SOLUTION INTRAVENOUS
Refills: 0 | Status: DISCONTINUED | OUTPATIENT
Start: 2020-05-01 | End: 2020-05-02

## 2020-05-01 RX ADMIN — DULOXETINE HYDROCHLORIDE 20 MILLIGRAM(S): 30 CAPSULE, DELAYED RELEASE ORAL at 11:06

## 2020-05-01 RX ADMIN — Medication 4: at 11:09

## 2020-05-01 RX ADMIN — SODIUM CHLORIDE 50 MILLILITER(S): 9 INJECTION, SOLUTION INTRAVENOUS at 21:28

## 2020-05-01 RX ADMIN — Medication 1 TABLET(S): at 11:08

## 2020-05-01 RX ADMIN — Medication 300 MILLIGRAM(S): at 11:06

## 2020-05-01 RX ADMIN — POLYETHYLENE GLYCOL 3350 17 GRAM(S): 17 POWDER, FOR SOLUTION ORAL at 14:51

## 2020-05-01 RX ADMIN — ENOXAPARIN SODIUM 40 MILLIGRAM(S): 100 INJECTION SUBCUTANEOUS at 18:27

## 2020-05-01 RX ADMIN — Medication 2: at 21:27

## 2020-05-01 RX ADMIN — PIPERACILLIN AND TAZOBACTAM 200 GRAM(S): 4; .5 INJECTION, POWDER, LYOPHILIZED, FOR SOLUTION INTRAVENOUS at 11:07

## 2020-05-01 RX ADMIN — Medication 650 MILLIGRAM(S): at 21:24

## 2020-05-01 RX ADMIN — Medication 650 MILLIGRAM(S): at 17:57

## 2020-05-01 RX ADMIN — PIPERACILLIN AND TAZOBACTAM 200 GRAM(S): 4; .5 INJECTION, POWDER, LYOPHILIZED, FOR SOLUTION INTRAVENOUS at 18:27

## 2020-05-01 RX ADMIN — Medication 10 MILLIGRAM(S): at 21:24

## 2020-05-01 RX ADMIN — SENNA PLUS 2 TABLET(S): 8.6 TABLET ORAL at 21:24

## 2020-05-01 RX ADMIN — Medication 125 MILLIGRAM(S): at 11:07

## 2020-05-01 RX ADMIN — Medication 650 MILLIGRAM(S): at 07:07

## 2020-05-01 RX ADMIN — Medication 125 MILLIGRAM(S): at 07:13

## 2020-05-01 RX ADMIN — Medication 2: at 18:00

## 2020-05-01 RX ADMIN — Medication 100 MICROGRAM(S): at 21:26

## 2020-05-01 RX ADMIN — PIPERACILLIN AND TAZOBACTAM 200 GRAM(S): 4; .5 INJECTION, POWDER, LYOPHILIZED, FOR SOLUTION INTRAVENOUS at 07:13

## 2020-05-01 RX ADMIN — Medication 1000 UNIT(S): at 18:27

## 2020-05-01 RX ADMIN — PIPERACILLIN AND TAZOBACTAM 200 GRAM(S): 4; .5 INJECTION, POWDER, LYOPHILIZED, FOR SOLUTION INTRAVENOUS at 22:07

## 2020-05-01 RX ADMIN — Medication 10 MILLIGRAM(S): at 11:06

## 2020-05-01 RX ADMIN — INSULIN GLARGINE 18 UNIT(S): 100 INJECTION, SOLUTION SUBCUTANEOUS at 21:25

## 2020-05-01 NOTE — CHART NOTE - NSCHARTNOTEFT_GEN_A_CORE
Upon Nutritional Assessment by the Registered Dietitian your patient was determined to meet criteria / has evidence of the following diagnosis/diagnoses:          [ X ]  Mild Protein Calorie Malnutrition        [ ]  Moderate Protein Calorie Malnutrition        [ ] Severe Protein Calorie Malnutrition        [ ] Unspecified Protein Calorie Malnutrition        [ ] Underweight / BMI <19        [ ] Morbid Obesity / BMI > 40      Findings as based on:  •  Comprehensive nutrition assessment and consultation  - Per visual exam pt noted with moderate/severe wasting to clavicle region  - Edema 1+ Generalized       Treatment/The following diet has been recommended:  1. While PO diet is safe/feasible, consider use of Consistent Carbohydrate + Glucerna daily as oral nutrition supplements; PO consistency per SLP Recs.   >> Maintain ASP Precautions at all times. Appreciate assistance during meals PRN.   2. Monitor for %PO intake/diet tolerance - s/s of issues chewing/swallowing.  3. Monitor need for NPO should pt be too lethargic for meals / issues swallowing be noted / should PO diet not be feasible d/t resp therapy.   4. Monitor Labs, Wts, Skin, GI distress, GOC.  5. MVI.   6. RD to remain available for additional nutrition interventions as needed.     PROVIDER Section:     By signing this assessment you are acknowledging and agree with the diagnosis/diagnoses assigned by the Registered Dietitian    Comments:

## 2020-05-01 NOTE — BEHAVIORAL HEALTH ASSESSMENT NOTE - NSBHADMITCOUNSELOTHER_PSY_A_CORE FT
Discussed his mood, his finger pain, and tried to discussed how he was coping with current hospitalization

## 2020-05-01 NOTE — BEHAVIORAL HEALTH ASSESSMENT NOTE - NSBHCHARTREVIEWVS_PSY_A_CORE FT
Vital Signs Last 24 Hrs  T(C): 35.6 (01 May 2020 13:40), Max: 36.4 (30 Apr 2020 22:00)  T(F): 96.1 (01 May 2020 13:40), Max: 97.5 (30 Apr 2020 22:00)  HR: 63 (01 May 2020 15:04) (52 - 77)  BP: 156/82 (01 May 2020 15:04) (108/60 - 158/85)  BP(mean): 111 (01 May 2020 15:04) (77 - 111)  RR: 19 (01 May 2020 15:04) (15 - 33)  SpO2: 92% (01 May 2020 15:04) (77% - 99%)

## 2020-05-01 NOTE — BEHAVIORAL HEALTH ASSESSMENT NOTE - CASE SUMMARY
Agree with above. Pt only partly adherent to interview. Increase cymbalta to 20 mg q12h. Avoid antipsychotics as QTc so elevated

## 2020-05-01 NOTE — BEHAVIORAL HEALTH ASSESSMENT NOTE - PROBLEM SELECTOR PLAN 1
-Address underlying predisposing factors for delirium, including treatment of COVID-19, hypernatremia, thrombotic events, circadian rhythm dysfunction, neuropathic pain  -Increase Cymbalta to 20mg BID to assist with neuropathic pain and improve circadian rhythm function  -Start him on Gabapentin 100mg TID, which may be given 100mg q6h PRN for agitation  -Casar orientation measures, including turning lights on during the day and off during night time, having staff talk to him through the day, bringing photographs from family members, having him communicate with family by phone if possible  -C/w Melatonin 10mg qhs orally  -Avoid antipsychotics at this point. If ultimately needed, Lurasidone or Aripiprazole have a lower for QTc prolongation  -Call psych for f/u re-consult if new questions arise

## 2020-05-01 NOTE — PROGRESS NOTE ADULT - SUBJECTIVE AND OBJECTIVE BOX
incomplete OVERNIGHT EVENTS: 0.5mg haldol IVP, carlos to 52 with 2s sinus pause while sleeping, BP stable    SUBJECTIVE / INTERVAL HPI: Patient seen and examined by attending physician.     ICU Vital Signs Last 24 Hrs  T(C): 35.9 (01 May 2020 10:04), Max: 36.4 (30 Apr 2020 22:00)  T(F): 96.6 (01 May 2020 10:04), Max: 97.5 (30 Apr 2020 22:00)  HR: 54 (01 May 2020 09:00) (53 - 69)  BP: 145/65 (01 May 2020 09:00) (108/60 - 158/85)  BP(mean): 94 (01 May 2020 09:00) (77 - 110)  ABP: --  ABP(mean): --  RR: 16 (01 May 2020 09:00) (16 - 33)  SpO2: 96% (01 May 2020 09:00) (77% - 99%)    MEDICATIONS  (STANDING):  allopurinol 300 milliGRAM(s) Oral daily  atorvastatin 20 milliGRAM(s) Oral at bedtime  bisacodyl Suppository 10 milliGRAM(s) Rectal daily  cholecalciferol 1000 Unit(s) Oral every 24 hours  dextrose 5%. 1000 milliLiter(s) (50 mL/Hr) IV Continuous <Continuous>  dextrose 50% Injectable 12.5 Gram(s) IV Push once  dextrose 50% Injectable 25 Gram(s) IV Push once  dextrose 50% Injectable 25 Gram(s) IV Push once  DULoxetine 20 milliGRAM(s) Oral daily  enoxaparin Injectable 40 milliGRAM(s) SubCutaneous every 24 hours  insulin glargine Injectable (LANTUS) 18 Unit(s) SubCutaneous at bedtime  insulin lispro (HumaLOG) corrective regimen sliding scale   SubCutaneous Before meals and at bedtime  levothyroxine Injectable 100 MICROGram(s) IV Push at bedtime  melatonin 10 milliGRAM(s) Oral at bedtime  methylPREDNISolone sodium succinate Injectable 125 milliGRAM(s) IV Push every 6 hours  pantoprazole    Tablet 40 milliGRAM(s) Oral before breakfast  piperacillin/tazobactam IVPB.. 4.5 Gram(s) IV Intermittent every 6 hours  polyethylene glycol 3350 17 Gram(s) Oral daily  senna 2 Tablet(s) Oral at bedtime  sodium bicarbonate 650 milliGRAM(s) Oral three times a day  trimethoprim  160 mG/sulfamethoxazole 800 mG 1 Tablet(s) Oral <User Schedule>    MEDICATIONS  (PRN):  acetaminophen   Tablet .. 650 milliGRAM(s) Oral every 6 hours PRN Temp greater or equal to 38C (100.4F)  dextrose 40% Gel 15 Gram(s) Oral once PRN Blood Glucose LESS THAN 70 milliGRAM(s)/deciliter  glucagon  Injectable 1 milliGRAM(s) IntraMuscular once PRN Glucose LESS THAN 70 milligrams/deciliter    .  LABS:                         14.3   6.59  )-----------( 308      ( 01 May 2020 07:01 )             44.2     05-01    153<H>  |  117<H>  |  45<H>  ----------------------------<  172<H>  4.8   |  22  |  1.42<H>    Ca    9.5      01 May 2020 07:01  Phos  4.0     05-01  Mg     2.6     05-01    TPro  6.0  /  Alb  2.8<L>  /  TBili  0.4  /  DBili  x   /  AST  49<H>  /  ALT  58<H>  /  AlkPhos  49  05-01                  RADIOLOGY, EKG & ADDITIONAL TESTS: Reviewed. OVERNIGHT EVENTS: 0.5mg haldol IVP, carlos to 52 with 2s sinus pause while sleeping, BP stable    SUBJECTIVE / INTERVAL HPI: Patient seen and examined by attending physician.      T(F): 96.6 (05-01-20 @ 10:04)  HR: 77 (05-01-20 @ 11:00)  BP: 143/77 (05-01-20 @ 11:00)  RR: 21 (05-01-20 @ 11:00)  SpO2: 90% (05-01-20 @ 11:00)    Exam per attending    MEDICATIONS  (STANDING):  allopurinol 300 milliGRAM(s) Oral daily  atorvastatin 20 milliGRAM(s) Oral at bedtime  bisacodyl Suppository 10 milliGRAM(s) Rectal daily  cholecalciferol 1000 Unit(s) Oral every 24 hours  dextrose 5%. 1000 milliLiter(s) (50 mL/Hr) IV Continuous <Continuous>  dextrose 5%. 1000 milliLiter(s) (50 mL/Hr) IV Continuous <Continuous>  dextrose 50% Injectable 12.5 Gram(s) IV Push once  dextrose 50% Injectable 25 Gram(s) IV Push once  dextrose 50% Injectable 25 Gram(s) IV Push once  DULoxetine 20 milliGRAM(s) Oral daily  enoxaparin Injectable 40 milliGRAM(s) SubCutaneous every 24 hours  insulin glargine Injectable (LANTUS) 18 Unit(s) SubCutaneous at bedtime  insulin lispro (HumaLOG) corrective regimen sliding scale   SubCutaneous Before meals and at bedtime  levothyroxine Injectable 100 MICROGram(s) IV Push at bedtime  melatonin 10 milliGRAM(s) Oral at bedtime  pantoprazole    Tablet 40 milliGRAM(s) Oral before breakfast  piperacillin/tazobactam IVPB.. 4.5 Gram(s) IV Intermittent every 6 hours  polyethylene glycol 3350 17 Gram(s) Oral daily  senna 2 Tablet(s) Oral at bedtime  sodium bicarbonate 650 milliGRAM(s) Oral three times a day  trimethoprim  160 mG/sulfamethoxazole 800 mG 1 Tablet(s) Oral <User Schedule>    MEDICATIONS  (PRN):  acetaminophen   Tablet .. 650 milliGRAM(s) Oral every 6 hours PRN Temp greater or equal to 38C (100.4F)  dextrose 40% Gel 15 Gram(s) Oral once PRN Blood Glucose LESS THAN 70 milliGRAM(s)/deciliter  glucagon  Injectable 1 milliGRAM(s) IntraMuscular once PRN Glucose LESS THAN 70 milligrams/deciliter    .  LABS:                         14.3   6.59  )-----------( 308      ( 01 May 2020 07:01 )             44.2     05-01    153<H>  |  117<H>  |  45<H>  ----------------------------<  172<H>  4.8   |  22  |  1.42<H>    Ca    9.5      01 May 2020 07:01  Phos  4.0     05-01  Mg     2.6     05-01    TPro  6.0  /  Alb  2.8<L>  /  TBili  0.4  /  DBili  x   /  AST  49<H>  /  ALT  58<H>  /  AlkPhos  49  05-01                  RADIOLOGY, EKG & ADDITIONAL TESTS: Reviewed.

## 2020-05-01 NOTE — BEHAVIORAL HEALTH ASSESSMENT NOTE - NSBHCONSULTFOLLOWAFTERCARE_PSY_A_CORE FT
Can be followed psychiatrically at [facility] or SNF. If discharged home will need to give referrals or see where he has been following up.

## 2020-05-01 NOTE — BEHAVIORAL HEALTH ASSESSMENT NOTE - SUMMARY
75M PMHx of DM2, who came to [facility] due to 1-week long fever s/p fall with increased fatigue/sleepiness. Found to be COVID+ with hyponatremia at [facility]. Admitted to [facility] for management of hyponatremia and AHRF 2/2 COVID-19, stepped up to ICU for closer monitoring.    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 Patient is not currently showing any signs or sx suggestive of MDD, and it seems likely that he is taking Cymbalta/Gabapentin to address neuropathic pain.    Given his long QTc and risk for TdP and negative cardiovascular outcomes, we would not recommend the use of antipsychotics at this point. Orientation therapy (turning lights on, having staff talk to the pt), Melatonin 10mg qhs, and proper antidepressant doses of Cymbalta may assist with restoring his circadian cycle and mitigating delirium sx. Also, Na+ levels are being corrected, and with a decrease in pain levels delirium sx may also subside. Of note, COVID-19 has been a/w delirium 2/2 inflammatory encephalopathy, direct viral damage, or thromboembolic events related to cytokine storm. 75M PMHx of DM2, who came to [facility] due to 1-week long fever s/p fall with increased fatigue/sleepiness. [facility] Ad[facility] to [facility] for management of hyponatremia and AHRF 2/2 COVID-19, stepped up to ICU for closer monitoring.    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 Patient is not currently showing any signs or sx suggestive of MDD, and it seems likely that he is taking Cymbalta/Gabapentin to address neuropathic pain.    Given his long QTc and risk for TdP and negative cardiovascular outcomes, we would not recommend the use of antipsychotics at this point. Orientation therapy (turning lights on, having staff talk to the pt), Melatonin 10mg qhs, and proper antidepressant doses of Cymbalta may assist with restoring his circadian cycle and mitigating delirium sx. Also, Na+ levels are being corrected, and with a decrease in pain levels delirium sx may also subside. Of note, COVID-19 has been a/w delirium 2/2 inflammatory encephalopathy, direct viral damage, or thromboembolic events related to cytokine storm.    Would increase cymbalta to 20 mg q12h    Can give neurontin 100 mg q8h prn agitation or ativan 1 mg q8h IM/IV if po neurontin refused prn agitation    Obtain collateral info from family

## 2020-05-01 NOTE — CHART NOTE - NSCHARTNOTEFT_GEN_A_CORE
Admitting Diagnosis:   Patient is a 75y old  Male who presents with a chief complaint of covid (01 May 2020 08:21)      PAST MEDICAL & SURGICAL HISTORY:  Type 2 diabetes mellitus      Current Nutrition Order:   Diet, Regular:   DASH/TLC {Sodium & Cholesterol Restricted} (DASH) (20 @ 09:54)  Diet, Regular:   Consistent Carbohydrate {No Snacks} (CSTCHO)  DASH/TLC {Sodium & Cholesterol Restricted} (DASH) (20 @ 10:03)  Diet, Dysphagia 1 Pureed-Nectar Consistency Fluid:   Consistent Carbohydrate {No Snacks} (CSTCHO) (20 @ 16:51)      PO Intake: Excellent (%) [   ]  Good (50-75%) [   ]  Fair (25-50%) [   ]  Poor (<25%) [   ]    GI Issues: +BM     Pain: None per flow sheets     Skin Integrity: 1+ generalized, no pressure ulcers       Labs:       153<H>  |  117<H>  |  45<H>  ----------------------------<  172<H>  4.8   |  22  |  1.42<H>    Ca    9.5      01 May 2020 07:01  Phos  4.0       Mg     2.6         TPro  6.0  /  Alb  2.8<L>  /  TBili  0.4  /  DBili  x   /  AST  49<H>  /  ALT  58<H>  /  AlkPhos  49      CAPILLARY BLOOD GLUCOSE      POCT Blood Glucose.: 145 mg/dL (01 May 2020 07:20)  POCT Blood Glucose.: 243 mg/dL (2020 22:39)  POCT Blood Glucose.: 234 mg/dL (2020 16:14)  POCT Blood Glucose.: 114 mg/dL (2020 10:56)      Medications:  MEDICATIONS  (STANDING):  allopurinol 300 milliGRAM(s) Oral daily  atorvastatin 20 milliGRAM(s) Oral at bedtime  cholecalciferol 1000 Unit(s) Oral every 24 hours  dextrose 5%. 1000 milliLiter(s) (50 mL/Hr) IV Continuous <Continuous>  dextrose 50% Injectable 12.5 Gram(s) IV Push once  dextrose 50% Injectable 25 Gram(s) IV Push once  dextrose 50% Injectable 25 Gram(s) IV Push once  DULoxetine 20 milliGRAM(s) Oral daily  enoxaparin Injectable 40 milliGRAM(s) SubCutaneous every 24 hours  famotidine    Tablet 20 milliGRAM(s) Oral daily  insulin glargine Injectable (LANTUS) 18 Unit(s) SubCutaneous at bedtime  insulin lispro (HumaLOG) corrective regimen sliding scale   SubCutaneous Before meals and at bedtime  levothyroxine Injectable 100 MICROGram(s) IV Push at bedtime  melatonin 10 milliGRAM(s) Oral at bedtime  methylPREDNISolone sodium succinate Injectable 125 milliGRAM(s) IV Push every 6 hours  piperacillin/tazobactam IVPB.. 4.5 Gram(s) IV Intermittent every 6 hours  QUEtiapine 12.5 milliGRAM(s) Oral at bedtime  sodium bicarbonate 650 milliGRAM(s) Oral three times a day  trimethoprim  160 mG/sulfamethoxazole 800 mG 1 Tablet(s) Oral <User Schedule>    MEDICATIONS  (PRN):  acetaminophen   Tablet .. 650 milliGRAM(s) Oral every 6 hours PRN Temp greater or equal to 38C (100.4F)  dextrose 40% Gel 15 Gram(s) Oral once PRN Blood Glucose LESS THAN 70 milliGRAM(s)/deciliter  glucagon  Injectable 1 milliGRAM(s) IntraMuscular once PRN Glucose LESS THAN 70 milligrams/deciliter      6'0''  IBW:178 pounds +/-10%,   190 pounds   BMI 25.8, 107% of IBW  Wt Change: No no EMR wts at this time. Stated per wife patients  pounds and was weighing about 182 pounds PTA    Nutrition Focused Physical Exam: Completed [   ]  Unable to complete [  x ]  Unable to complete due to isolation demands and unable to do a face to face    Estimated energy needs  Based on ABW due to between % of IBW  Adjusted for Age based on COVID vital infection, fluids per team   25-30kcal: 2150kcal-2580kcal/day  1.0-1.2gmprotein/k-103gmprotein/day    Subjective: 76y/o male with history of T2DM (A1c 6.6%) admitted with change in mental status and hyponatremia s/p fall and found to have COVID-19+ (Consented for Plasma). +Awake pron-ing. Pt with Toxic Metabolic Encephalopathy 2/2 hyponatremia, however CT head negative. Noted Delirium and pulling at masks. Pt stepped up to ICU for closer monitoring. Noted LIZ- Possible COVID component to kidney injury as well (BUN/Cr 45/1.42).  Pt is s/p Swallow Eval  with recs for Puree/nectar-thick liquid + noted will require careful hand-feeding with modified texture diet to reduce aspiration risk. Currently ordered for consCHO, Puree/nectar-thick liquid . Noted no Dinner on  d/t need for BiPAP.   Please see below for nutritions recommendations.       Prior Nutrition Diagnosis: Increased nutrient needs r/t increased demand for protein and nutrients AEB: COVID-19+ hypermetabolic demands  ON GOING AT THIS TIME   Goal: Meet >75% of EER consistently    Recommendations:    Education: NA   Risk Level: High [   ] Moderate [  x ] Low [   ]. Admitting Diagnosis:   Patient is a 75y old  Male who presents with a chief complaint of covid (01 May 2020 08:21)      PAST MEDICAL & SURGICAL HISTORY:  Type 2 diabetes mellitus      Current Nutrition Order:   Diet, Regular:   DASH/TLC {Sodium & Cholesterol Restricted} (DASH) (20 @ 09:54)  Diet, Regular:   Consistent Carbohydrate {No Snacks} (CSTCHO)  DASH/TLC {Sodium & Cholesterol Restricted} (DASH) (20 @ 10:03)  Diet, Dysphagia 1 Pureed-Nectar Consistency Fluid:   Consistent Carbohydrate {No Snacks} (CSTCHO) (20 @ 16:51)      PO Intake: Excellent (%) [   ]  Good (50-75%) [   ]  Fair (25-50%) [   ]  Poor (<25%) [ X ]- Please see below     GI Issues: +BM     Pain: None per flow sheets     Skin Integrity: 1+ generalized, no pressure ulcers       Labs:       153<H>  |  117<H>  |  45<H>  ----------------------------<  172<H>  4.8   |  22  |  1.42<H>    Ca    9.5      01 May 2020 07:01  Phos  4.0     05-  Mg     2.6     -    TPro  6.0  /  Alb  2.8<L>  /  TBili  0.4  /  DBili  x   /  AST  49<H>  /  ALT  58<H>  /  AlkPhos  49      CAPILLARY BLOOD GLUCOSE      POCT Blood Glucose.: 145 mg/dL (01 May 2020 07:20)  POCT Blood Glucose.: 243 mg/dL (2020 22:39)  POCT Blood Glucose.: 234 mg/dL (2020 16:14)  POCT Blood Glucose.: 114 mg/dL (2020 10:56)      Medications:  MEDICATIONS  (STANDING):  allopurinol 300 milliGRAM(s) Oral daily  atorvastatin 20 milliGRAM(s) Oral at bedtime  cholecalciferol 1000 Unit(s) Oral every 24 hours  dextrose 5%. 1000 milliLiter(s) (50 mL/Hr) IV Continuous <Continuous>  dextrose 50% Injectable 12.5 Gram(s) IV Push once  dextrose 50% Injectable 25 Gram(s) IV Push once  dextrose 50% Injectable 25 Gram(s) IV Push once  DULoxetine 20 milliGRAM(s) Oral daily  enoxaparin Injectable 40 milliGRAM(s) SubCutaneous every 24 hours  famotidine    Tablet 20 milliGRAM(s) Oral daily  insulin glargine Injectable (LANTUS) 18 Unit(s) SubCutaneous at bedtime  insulin lispro (HumaLOG) corrective regimen sliding scale   SubCutaneous Before meals and at bedtime  levothyroxine Injectable 100 MICROGram(s) IV Push at bedtime  melatonin 10 milliGRAM(s) Oral at bedtime  methylPREDNISolone sodium succinate Injectable 125 milliGRAM(s) IV Push every 6 hours  piperacillin/tazobactam IVPB.. 4.5 Gram(s) IV Intermittent every 6 hours  QUEtiapine 12.5 milliGRAM(s) Oral at bedtime  sodium bicarbonate 650 milliGRAM(s) Oral three times a day  trimethoprim  160 mG/sulfamethoxazole 800 mG 1 Tablet(s) Oral <User Schedule>    MEDICATIONS  (PRN):  acetaminophen   Tablet .. 650 milliGRAM(s) Oral every 6 hours PRN Temp greater or equal to 38C (100.4F)  dextrose 40% Gel 15 Gram(s) Oral once PRN Blood Glucose LESS THAN 70 milliGRAM(s)/deciliter  glucagon  Injectable 1 milliGRAM(s) IntraMuscular once PRN Glucose LESS THAN 70 milligrams/deciliter      6'0''  IBW:178 pounds +/-10%,   190 pounds   BMI 25.8, 107% of IBW  Wt Change: No no EMR wts at this time. Stated per wife patients  pounds and was weighing about 182 pounds PTA    Nutrition Focused Physical Exam: Completed [   ]  Unable to complete [  x ]  Unable to complete due to isolation demands and unable to do a face to face, However per visual exam pt noted with moderate/severe wasting to clavicle region.     Estimated energy needs  Based on ABW due to between % of IBW  Adjusted for Age based on COVID vital infection, adjusted for suspected malnutrition, fluids per team   25-30kcal: 2150kcal-2580kcal/day  1.2-1.4gmprotein/k-113gmprotein/day -- trend renal indices     Subjective:  74y/o male with history of T2DM (A1c 6.6%) admitted with change in mental status and hyponatremia s/p fall and found to have COVID-19+ (Consented for Plasma). +Awake pron-ing. Pt with Toxic Metabolic Encephalopathy 2/2 hyponatremia, however CT head negative. Noted Delirium and pulling at masks. Pt stepped up to ICU for closer monitoring. Noted LIZ- Possible COVID component to kidney injury as well (BUN/Cr 45/1.42).  Pt is s/p Swallow Eval  with recs for Puree/nectar-thick liquid + noted will require careful hand-feeding with modified texture diet to reduce aspiration risk. Currently ordered for consCHO, Puree/nectar-thick liquid . Noted no Dinner on  d/t need for BiPAP, pt seen on NRB this morning. Spoke with RN, reports pt has not yet eaten - pt was given haldol d/t agitation . Pt has remained sleepy this morning and has not yet eaten as result.   Please see below for nutritions recommendations. Pending order placed. Recs made with team.     Prior Nutrition Diagnosis: Increased nutrient needs r/t increased demand for protein and nutrients AEB: COVID-19+ hypermetabolic demands  ON GOING AT THIS TIME   New PES: Inadequate oral Intake r/t decreased ability for meals 2/2 mental status AEB 0% PO intake today   Goal: Meet >75% of EER consistently via feasible route     Recommendations:  1. While PO diet is safe/feasible, consider use of Consistent Carbohydrate + Glucerna daily as oral nutrition supplements; PO consistency per SLP Recs.   >> Maintain ASP Precautions at all times. Appreciate assistance during meals PRN.   2. Monitor for %PO intake/diet tolerance - s/s of issues chewing/swallowing.  3. Monitor need for NPO should pt be too lethargic for meals / issues swallowing be noted / should PO diet not be feasible d/t resp therapy.   4. Monitor Labs, Wts, Skin, GI distress, GOC.  5. MVI.   6. RD to remain available for additional nutrition interventions as needed.     Education: NA   Risk Level: High [ X ] Moderate [  X ] Low [   ].

## 2020-05-01 NOTE — BEHAVIORAL HEALTH ASSESSMENT NOTE - NSBHADMITCOUNSEL_PSY_A_CORE
diagnostic results/impressions and/or recommended studies/prognosis/client/family/caregiver education/risks and benefits of treatment options/instructions for management, treatment and follow up/risk factor reduction/other.../importance of adherence to chosen treatment

## 2020-05-01 NOTE — PROGRESS NOTE ADULT - ASSESSMENT
75M PMHx of DM presented for 1 week of fevers. Patient's wife reported fall 1 week ago hitting head and noted increased fatigue/sleepiness. [facility] COVID[facility] Ad[facility] to [facility] for management of hyponatremia and AHRF 2/2 COVID-19, stepped up to ICU for closer monitoring.     NEUROLOGY: LAURENCE   AAOx3, conversive.  -Patient attempting to get out of bed at nighttime, stating he is going home, no known history of dementia.    #Depression  -c/w home cymbalta     #Neuropathy  Hx of neuropathy on duloxetine and gabapentin  -c/w home cymbalta    #Delirium  Patient noted to be sundowning and often pulling off mask at nighttime.   - seroquel 12.5 at bedtime.   - monitor QTc    CARDIOVASCULAR  #Hypertension  Patient with history of HTN on home losartan and atenolol  - holding losartan in setting of liz and COVID  - holding home norvasc 5 mg    RESPIRATORY   #Acute Hypoxic Respiratory Failure 2/2 COVID-19   Pt with increasing O2 requirements, desaturating on HFNC, requiring BiPAP  - CXR 4/25 unchanged from prior day  - Continue to trend inflammatory markers  - s/p hydroxychloroquine and azithromycin  - Placed on cefpodoxime (4/21) to cover CAP, switched to zosyn (completed on 4/27)  - s/p Toci 4/24  - awake proning/laying on right side   - HFNC during day, BiPAP at night    INFECTIOUS DISEASE  #COVID-19 Pneumonia   COVID-19 + 4/21.  -f/u procalcitonin to eval for bacterial infection  -f/u LDH, CRP, ferritin, d-dimer, haptoglobin, CK, G6PD, ESR/CRP, fibrinogen, QuantiFeron  -contact/airborne precaution  -tylenol PRN for fever  -Robitussin PRN for cough  -avoid NSAIDs  -no nebulizers, MDI only  - awake proning  - lay on right side     RENAL/METABOLIC   #LIZ   Noted Cr at 1.91, unknown baseline but noted improvement to 1.64 with 3L IVF. Possible COVID component to kidney injury as well.   - urine lytes -> prerenal  - Hold home losartan in setting of LIZ  - avoid nephrotoxic agents    GI:  - Protonix 40mg for prophylaxis    #Dysphagia  Failed bedside dysphagia.  - speech and swallow w/ recommendation of puree/nectar thick   - hold PM feed for BiPAP     ENDOCRINE:  #Hypothyroidism  Hx of hypothyroidism on synthroid  -c/w home synthroid dose    #Type 2 Diabetes   Hx of DM on basal 28U and premeal 6U, also on statin, ARB  - Mod ISS  - Lantus 18 qHS and hold premeal --> will see requirements from sliding scale  - A1C 6.6   - finger sticks q4 hr     PROPHYLACTIC MEASURE:  F: tolerating PO, no IVF  E: replete K<4, Mg<2  N: NPO in setting of BiPAP     VTE Prophylaxis: Lovenox SubQ  C: Full Code  D: COVID-MICU 75M PMHx of DM presented for 1 week of fevers. Patient's wife reported fall 1 week ago hitting head and noted increased fatigue/sleepiness. [facility] for management of hyponatremia and AHRF 2/2 COVID-19, stepped up to ICU for closer monitoring.     NEUROLOGY: LAURENCE   AAOx3, conversive.  -Patient attempting to get out of bed at nighttime, stating he is going home, no known history of dementia.    #Depression  -c/w home cymbalta  -will consult psych as pt depressed on exam     #Neuropathy  Hx of neuropathy on duloxetine and gabapentin  -c/w home cymbalta    #Delirium  Patient noted to be sundowning and often pulling off mask at nighttime.   - c/w zyprexa, no haldol (prolonged qtc) or ativan   - monitor QTc    CARDIOVASCULAR  #Hypertension  Patient with history of HTN on home losartan and atenolol  - holding losartan in setting of liz and COVID  - holding home norvasc 5 mg    RESPIRATORY   #Acute Hypoxic Respiratory Failure 2/2 COVID-19   Pt with increasing O2 requirements, desaturating on HFNC, requiring BiPAP  - CXR 4/25 unchanged from prior day  - Continue to trend inflammatory markers  - s/p hydroxychloroquine and azithromycin  - Placed on cefpodoxime (4/21) to cover CAP, switched to zosyn (completed on 4/27)  - s/p Toci 4/24  - awake proning/laying on right side   - HFNC during day, BiPAP at night  - Doing well on HFNC, may be 2/2 3 day course of steroids. Will consider continuing steroids tomorrow    INFECTIOUS DISEASE  #COVID-19 Pneumonia   COVID-19 + 4/21.  -f/u procalcitonin to eval for bacterial infection  -f/u LDH, CRP, ferritin, d-dimer, haptoglobin, CK, G6PD, ESR/CRP, fibrinogen, QuantiFeron  -contact/airborne precaution  -tylenol PRN for fever  -Robitussin PRN for cough  -avoid NSAIDs  -no nebulizers, MDI only  - awake proning  - lay on right side     RENAL/METABOLIC   #LIZ   Noted Cr at 1.91, unknown baseline but noted improvement to 1.64 with 3L IVF. Possible COVID component to kidney injury as well.   - urine lytes -> prerenal  - creatinine improving  - Hold home losartan in setting of LIZ  - avoid nephrotoxic agents    #Hypernatremia   Na 153  -started D5W 50cc/hr  -c/w frequent finger stick monitoring  -trend Na    GI:  - Protonix 40mg for prophylaxis  - no bm's, started bowel regimen    #Dysphagia  Failed bedside dysphagia.  - speech and swallow w/ recommendation of puree/nectar thick   - hold PM feed for BiPAP     ENDOCRINE:  #Hypothyroidism  Hx of hypothyroidism on synthroid  -c/w home synthroid dose  -tsh normal    #Type 2 Diabetes   Hx of DM on basal 28U and premeal 6U, also on statin, ARB  - Mod ISS  - c/w Lantus 18 qHS  - A1C 6.6   - finger sticks q4 hr     PROPHYLACTIC MEASURE:  F: tolerating PO, no IVF  E: replete K<4, Mg<2  N: NPO in setting of BiPAP     VTE Prophylaxis: Lovenox SubQ  C: Full Code  D: COVID-MICU 75M PMHx of DM presented for 1 week of fevers. Patient's wife reported fall 1 week ago hitting head and noted increased fatigue/sleepiness. [facility] Admitted to [facility] for management of hyponatremia and AHRF 2/2 COVID-19, stepped up to ICU for closer monitoring.     NEUROLOGY: LAURENCE   AAOx3, conversive.  -Patient attempting to get out of bed at nighttime, stating he is going home, no known history of dementia.    #Depression  -c/w home cymbalta  -will consult psych as pt depressed on exam     #Neuropathy  Hx of neuropathy on duloxetine and gabapentin  -c/w home cymbalta    #Delirium  Patient noted to be sundowning and often pulling off mask at nighttime.   - c/w zyprexa, no haldol (prolonged qtc) or ativan   - monitor QTc    CARDIOVASCULAR  #Hypertension  Patient with history of HTN on home losartan and atenolol  - holding losartan in setting of liz and COVID  - holding home norvasc 5 mg    RESPIRATORY   #Acute Hypoxic Respiratory Failure 2/2 COVID-19   Pt with increasing O2 requirements, desaturating on HFNC, requiring BiPAP  - CXR 4/25 unchanged from prior day  - Continue to trend inflammatory markers  - s/p hydroxychloroquine and azithromycin  - Placed on cefpodoxime (4/21) to cover CAP, switched to zosyn (completed on 4/27)  - s/p Toci 4/24  - awake proning/laying on right side   - HFNC during day, BiPAP at night  - Doing well on HFNC, may be 2/2 3 day course of steroids. Will consider continuing steroids tomorrow    INFECTIOUS DISEASE  #COVID-19 Pneumonia   COVID-19 + 4/21.  -f/u procalcitonin to eval for bacterial infection  -f/u LDH, CRP, ferritin, d-dimer, haptoglobin, CK, G6PD, ESR/CRP, fibrinogen, QuantiFeron  -contact/airborne precaution  -tylenol PRN for fever  -Robitussin PRN for cough  -avoid NSAIDs  -no nebulizers, MDI only  - awake proning  - lay on right side     RENAL/METABOLIC   #LIZ   Noted Cr at 1.91, unknown baseline but noted improvement to 1.64 with 3L IVF. Possible COVID component to kidney injury as well.   - urine lytes -> prerenal  - creatinine improving  - Hold home losartan in setting of LIZ  - avoid nephrotoxic agents    #Hypernatremia   Na 153  -started D5W 50cc/hr  -c/w frequent finger stick monitoring  -trend Na    GI:  - Protonix 40mg for prophylaxis  - no bm's, started bowel regimen    #Dysphagia  Failed bedside dysphagia.  - speech and swallow w/ recommendation of puree/nectar thick   - hold PM feed for BiPAP     ENDOCRINE:  #Hypothyroidism  Hx of hypothyroidism on synthroid  -c/w home synthroid dose  -tsh normal    #Type 2 Diabetes   Hx of DM on basal 28U and premeal 6U, also on statin, ARB  - Mod ISS  - c/w Lantus 18 qHS  - A1C 6.6   - finger sticks q4 hr     Heme  #Elevated d-dimer  -rising d-dimer most likely 2/2 covid  -will ordered dopplers lower extremity    PROPHYLACTIC MEASURE:  F: tolerating PO, no IVF  E: replete K<4, Mg<2  N: NPO in setting of BiPAP     VTE Prophylaxis: Lovenox SubQ  C: Full Code  D: COVID-MICU

## 2020-05-01 NOTE — BEHAVIORAL HEALTH ASSESSMENT NOTE - NSBHREFERDETAILS_PSY_A_CORE_FT
75M PMHx of DM presented for 1 week of fevers. Patient's wife reported fall 1 week ago hitting head and noted increased fatigue/sleepiness. Found to be COVID+, with hyponatremia at [facility]. Admitted to [facility] for management of hyponatremia and AHRF 2/2 COVID-19, stepped up to ICU for closer monitoring. Question for depression

## 2020-05-01 NOTE — BEHAVIORAL HEALTH ASSESSMENT NOTE - HPI (INCLUDE ILLNESS QUALITY, SEVERITY, DURATION, TIMING, CONTEXT, MODIFYING FACTORS, ASSOCIATED SIGNS AND SYMPTOMS)
Consult done through telehealth with the tele-presenter Jing. Patient removed his nasal airway support as we started this consult and did not voluntarily put it back as we finished. As we introduced ourselves, we started asking about his mood and reasons for being in the hospital. He told us he is "feeling fine" and not sure of why he was admitted to the hospital. He denied having any heart or lung issues, while also denying to be admitted to the hospital to treat an infection. He denied having any depressive mood and did not appear to be hopeless. In fact, his attention span seemed to fluctuate to some extent through the interview, and it was notable a degree of latency in some of his answers. Patient also denied ever being diagnosed with depression. He told us he takes Cymbalta and Neurontin for "finger pain", although he could not remember the name of Cymbalta as we initially asked. As we gradually steered towards a more structured assessment of his orientation and attention, the patient asked us to stop the consul, as he was feeling tired and did not want to talk with us anymore.

## 2020-05-01 NOTE — PROGRESS NOTE ADULT - ATTENDING COMMENTS
75 year old male with h/o DM, never smoker with acute hypoxic respiratory failure with COVID pneumonitis. Currently on BIPAP and high flow with progressing hypernatremia.  Plan:  - Feeding during daytime. Avoid dinner to prevent aspiration with use of CPAP.  - D5W at 75 cc/hr for hypernatremia.  - High flow oxygen, awake proning. Change back to BIPAP during night time  - Patient has delerium during night time. Received haldol. QT was prolonged. d/c seroquel. avoid haldol.  - OOB to chair  - IV solumedrol 125 Q 6hr for 3 days to continue.  - PCP prophylaxis  - high risk for intubation  - Continue Zosyn for aspiration pneumonia.  - Patient is not in resp distress. Still requiring high flow oxygen but appears to be less tachypneic.

## 2020-05-02 DIAGNOSIS — N18.9 CHRONIC KIDNEY DISEASE, UNSPECIFIED: ICD-10-CM

## 2020-05-02 DIAGNOSIS — E87.0 HYPEROSMOLALITY AND HYPERNATREMIA: ICD-10-CM

## 2020-05-02 LAB
ALBUMIN SERPL ELPH-MCNC: 3.2 G/DL — LOW (ref 3.3–5)
ALP SERPL-CCNC: 48 U/L — SIGNIFICANT CHANGE UP (ref 40–120)
ALT FLD-CCNC: 53 U/L — HIGH (ref 10–45)
ANION GAP SERPL CALC-SCNC: 10 MMOL/L — SIGNIFICANT CHANGE UP (ref 5–17)
ANION GAP SERPL CALC-SCNC: 10 MMOL/L — SIGNIFICANT CHANGE UP (ref 5–17)
APTT BLD: 24.7 SEC — LOW (ref 27.5–36.3)
AST SERPL-CCNC: 34 U/L — SIGNIFICANT CHANGE UP (ref 10–40)
BASE EXCESS BLDA CALC-SCNC: 0.7 MMOL/L — SIGNIFICANT CHANGE UP (ref -2–3)
BASE EXCESS BLDA CALC-SCNC: 1.7 MMOL/L — SIGNIFICANT CHANGE UP (ref -2–3)
BASOPHILS # BLD AUTO: 0 K/UL — SIGNIFICANT CHANGE UP (ref 0–0.2)
BASOPHILS NFR BLD AUTO: 0 % — SIGNIFICANT CHANGE UP (ref 0–2)
BILIRUB SERPL-MCNC: 0.4 MG/DL — SIGNIFICANT CHANGE UP (ref 0.2–1.2)
BUN SERPL-MCNC: 45 MG/DL — HIGH (ref 7–23)
BUN SERPL-MCNC: 46 MG/DL — HIGH (ref 7–23)
CALCIUM SERPL-MCNC: 9.2 MG/DL — SIGNIFICANT CHANGE UP (ref 8.4–10.5)
CALCIUM SERPL-MCNC: 9.4 MG/DL — SIGNIFICANT CHANGE UP (ref 8.4–10.5)
CHLORIDE SERPL-SCNC: 114 MMOL/L — HIGH (ref 96–108)
CHLORIDE SERPL-SCNC: 122 MMOL/L — HIGH (ref 96–108)
CO2 SERPL-SCNC: 25 MMOL/L — SIGNIFICANT CHANGE UP (ref 22–31)
CO2 SERPL-SCNC: 25 MMOL/L — SIGNIFICANT CHANGE UP (ref 22–31)
CREAT SERPL-MCNC: 1.3 MG/DL — SIGNIFICANT CHANGE UP (ref 0.5–1.3)
CREAT SERPL-MCNC: 1.52 MG/DL — HIGH (ref 0.5–1.3)
CRP SERPL-MCNC: 0.7 MG/DL — HIGH (ref 0–0.4)
D DIMER BLD IA.RAPID-MCNC: 799 NG/ML DDU — HIGH
EOSINOPHIL # BLD AUTO: 0 K/UL — SIGNIFICANT CHANGE UP (ref 0–0.5)
EOSINOPHIL NFR BLD AUTO: 0 % — SIGNIFICANT CHANGE UP (ref 0–6)
FERRITIN SERPL-MCNC: 927 NG/ML — HIGH (ref 30–400)
G6PD RBC-CCNC: 12.7 U/G HGB — SIGNIFICANT CHANGE UP (ref 7–20.5)
GAS PNL BLDA: SIGNIFICANT CHANGE UP
GLUCOSE SERPL-MCNC: 218 MG/DL — HIGH (ref 70–99)
GLUCOSE SERPL-MCNC: 259 MG/DL — HIGH (ref 70–99)
HCO3 BLDA-SCNC: 26 MMOL/L — SIGNIFICANT CHANGE UP (ref 21–28)
HCO3 BLDA-SCNC: 26 MMOL/L — SIGNIFICANT CHANGE UP (ref 21–28)
HCT VFR BLD CALC: 51.8 % — HIGH (ref 39–50)
HGB BLD-MCNC: 16.6 G/DL — SIGNIFICANT CHANGE UP (ref 13–17)
IMM GRANULOCYTES NFR BLD AUTO: 0.5 % — SIGNIFICANT CHANGE UP (ref 0–1.5)
INR BLD: 1.29 — HIGH (ref 0.88–1.16)
LYMPHOCYTES # BLD AUTO: 0.31 K/UL — LOW (ref 1–3.3)
LYMPHOCYTES # BLD AUTO: 4.9 % — LOW (ref 13–44)
MAGNESIUM SERPL-MCNC: 2.7 MG/DL — HIGH (ref 1.6–2.6)
MCHC RBC-ENTMCNC: 30.7 PG — SIGNIFICANT CHANGE UP (ref 27–34)
MCHC RBC-ENTMCNC: 32 GM/DL — SIGNIFICANT CHANGE UP (ref 32–36)
MCV RBC AUTO: 95.9 FL — SIGNIFICANT CHANGE UP (ref 80–100)
MONOCYTES # BLD AUTO: 0.3 K/UL — SIGNIFICANT CHANGE UP (ref 0–0.9)
MONOCYTES NFR BLD AUTO: 4.8 % — SIGNIFICANT CHANGE UP (ref 2–14)
NEUTROPHILS # BLD AUTO: 5.67 K/UL — SIGNIFICANT CHANGE UP (ref 1.8–7.4)
NEUTROPHILS NFR BLD AUTO: 89.8 % — HIGH (ref 43–77)
NRBC # BLD: 0 /100 WBCS — SIGNIFICANT CHANGE UP (ref 0–0)
PCO2 BLDA: 42 MMHG — SIGNIFICANT CHANGE UP (ref 35–48)
PCO2 BLDA: 42 MMHG — SIGNIFICANT CHANGE UP (ref 35–48)
PH BLDA: 7.4 — SIGNIFICANT CHANGE UP (ref 7.35–7.45)
PH BLDA: 7.42 — SIGNIFICANT CHANGE UP (ref 7.35–7.45)
PHOSPHATE SERPL-MCNC: 4.1 MG/DL — SIGNIFICANT CHANGE UP (ref 2.5–4.5)
PLATELET # BLD AUTO: 240 K/UL — SIGNIFICANT CHANGE UP (ref 150–400)
PO2 BLDA: 107 MMHG — SIGNIFICANT CHANGE UP (ref 83–108)
PO2 BLDA: 233 MMHG — HIGH (ref 83–108)
POTASSIUM SERPL-MCNC: 4.4 MMOL/L — SIGNIFICANT CHANGE UP (ref 3.5–5.3)
POTASSIUM SERPL-MCNC: SIGNIFICANT CHANGE UP MMOL/L (ref 3.5–5.3)
POTASSIUM SERPL-SCNC: 4.4 MMOL/L — SIGNIFICANT CHANGE UP (ref 3.5–5.3)
POTASSIUM SERPL-SCNC: SIGNIFICANT CHANGE UP MMOL/L (ref 3.5–5.3)
PROT SERPL-MCNC: 6 G/DL — SIGNIFICANT CHANGE UP (ref 6–8.3)
PROTHROM AB SERPL-ACNC: 14.8 SEC — HIGH (ref 10–12.9)
RBC # BLD: 5.4 M/UL — SIGNIFICANT CHANGE UP (ref 4.2–5.8)
RBC # FLD: 15.2 % — HIGH (ref 10.3–14.5)
SAO2 % BLDA: 97 % — SIGNIFICANT CHANGE UP (ref 95–100)
SAO2 % BLDA: 99 % — SIGNIFICANT CHANGE UP (ref 95–100)
SODIUM SERPL-SCNC: 149 MMOL/L — HIGH (ref 135–145)
SODIUM SERPL-SCNC: 157 MMOL/L — HIGH (ref 135–145)
WBC # BLD: 6.31 K/UL — SIGNIFICANT CHANGE UP (ref 3.8–10.5)
WBC # FLD AUTO: 6.31 K/UL — SIGNIFICANT CHANGE UP (ref 3.8–10.5)

## 2020-05-02 PROCEDURE — 99291 CRITICAL CARE FIRST HOUR: CPT | Mod: CS

## 2020-05-02 RX ORDER — DULOXETINE HYDROCHLORIDE 30 MG/1
20 CAPSULE, DELAYED RELEASE ORAL
Refills: 0 | Status: DISCONTINUED | OUTPATIENT
Start: 2020-05-02 | End: 2020-05-11

## 2020-05-02 RX ORDER — OLANZAPINE 15 MG/1
5 TABLET, FILM COATED ORAL AT BEDTIME
Refills: 0 | Status: DISCONTINUED | OUTPATIENT
Start: 2020-05-02 | End: 2020-05-09

## 2020-05-02 RX ORDER — HALOPERIDOL DECANOATE 100 MG/ML
2.5 INJECTION INTRAMUSCULAR ONCE
Refills: 0 | Status: COMPLETED | OUTPATIENT
Start: 2020-05-02 | End: 2020-05-02

## 2020-05-02 RX ORDER — SODIUM CHLORIDE 9 MG/ML
1000 INJECTION, SOLUTION INTRAVENOUS
Refills: 0 | Status: DISCONTINUED | OUTPATIENT
Start: 2020-05-02 | End: 2020-05-03

## 2020-05-02 RX ADMIN — Medication 40 MILLIGRAM(S): at 10:11

## 2020-05-02 RX ADMIN — DULOXETINE HYDROCHLORIDE 20 MILLIGRAM(S): 30 CAPSULE, DELAYED RELEASE ORAL at 17:00

## 2020-05-02 RX ADMIN — PANTOPRAZOLE SODIUM 40 MILLIGRAM(S): 20 TABLET, DELAYED RELEASE ORAL at 05:03

## 2020-05-02 RX ADMIN — SENNA PLUS 2 TABLET(S): 8.6 TABLET ORAL at 21:10

## 2020-05-02 RX ADMIN — OLANZAPINE 5 MILLIGRAM(S): 15 TABLET, FILM COATED ORAL at 21:13

## 2020-05-02 RX ADMIN — INSULIN GLARGINE 18 UNIT(S): 100 INJECTION, SOLUTION SUBCUTANEOUS at 21:11

## 2020-05-02 RX ADMIN — Medication 650 MILLIGRAM(S): at 05:02

## 2020-05-02 RX ADMIN — ATORVASTATIN CALCIUM 20 MILLIGRAM(S): 80 TABLET, FILM COATED ORAL at 21:10

## 2020-05-02 RX ADMIN — POLYETHYLENE GLYCOL 3350 17 GRAM(S): 17 POWDER, FOR SOLUTION ORAL at 10:12

## 2020-05-02 RX ADMIN — Medication 650 MILLIGRAM(S): at 12:00

## 2020-05-02 RX ADMIN — Medication 4: at 06:00

## 2020-05-02 RX ADMIN — ENOXAPARIN SODIUM 40 MILLIGRAM(S): 100 INJECTION SUBCUTANEOUS at 17:01

## 2020-05-02 RX ADMIN — Medication 100 MICROGRAM(S): at 21:11

## 2020-05-02 RX ADMIN — PIPERACILLIN AND TAZOBACTAM 200 GRAM(S): 4; .5 INJECTION, POWDER, LYOPHILIZED, FOR SOLUTION INTRAVENOUS at 05:01

## 2020-05-02 RX ADMIN — Medication 6: at 22:58

## 2020-05-02 RX ADMIN — Medication 10 MILLIGRAM(S): at 10:09

## 2020-05-02 RX ADMIN — PIPERACILLIN AND TAZOBACTAM 200 GRAM(S): 4; .5 INJECTION, POWDER, LYOPHILIZED, FOR SOLUTION INTRAVENOUS at 17:01

## 2020-05-02 RX ADMIN — HALOPERIDOL DECANOATE 2.5 MILLIGRAM(S): 100 INJECTION INTRAMUSCULAR at 01:49

## 2020-05-02 RX ADMIN — Medication 300 MILLIGRAM(S): at 10:09

## 2020-05-02 RX ADMIN — SODIUM CHLORIDE 50 MILLILITER(S): 9 INJECTION, SOLUTION INTRAVENOUS at 09:00

## 2020-05-02 RX ADMIN — Medication 2: at 11:29

## 2020-05-02 RX ADMIN — Medication 1000 UNIT(S): at 17:01

## 2020-05-02 RX ADMIN — PIPERACILLIN AND TAZOBACTAM 200 GRAM(S): 4; .5 INJECTION, POWDER, LYOPHILIZED, FOR SOLUTION INTRAVENOUS at 10:09

## 2020-05-02 NOTE — PROGRESS NOTE ADULT - PROBLEM SELECTOR PLAN 1
Serum Na still trending upwards  D5W at 50cc/hr initiated yesterday with increase today to 75cc/hr  patient with~ 5L free water deficit  increase IVF rate to 100cc/hr

## 2020-05-02 NOTE — PROGRESS NOTE ADULT - ATTENDING COMMENTS
75 year old male with h/o DM, never smoker with acute hypoxic respiratory failure with COVID pneumonitis. Currently on BIPAP and high flow with progressing hypernatremia.  Plan:  - Feeding during daytime. Avoid dinner to prevent aspiration with use of CPAP.  - D5W at 75 cc/hr for hypernatremia. Continue Lantus for DM.  - High flow oxygen to d/c, change to NRB. Change back to CPAP during night time (patient use at night)  - Patient has delirium during night time. QT was prolonged. d/c Seroquel. Avoid haldol. Zyprexa to continue.  - OOB to chair  - IV solumedrol 125 to d/c. Oral prednisone 40 mg daily  - PCP prophylaxis  - D/c zosyn after 7 days

## 2020-05-02 NOTE — PROGRESS NOTE ADULT - SUBJECTIVE AND OBJECTIVE BOX
**Incomplete Note**  OVERNIGHT EVENTS:    SUBJECTIVE / INTERVAL HPI: Patient seen and examined at bedside.     VITAL SIGNS:  Vital Signs Last 24 Hrs  T(C): 36 (02 May 2020 04:00), Max: 36.1 (01 May 2020 18:08)  T(F): 96.8 (02 May 2020 04:00), Max: 97 (01 May 2020 18:08)  HR: 69 (02 May 2020 10:00) (57 - 77)  BP: 157/76 (02 May 2020 10:00) (113/65 - 157/76)  BP(mean): 109 (02 May 2020 10:00) (83 - 111)  RR: 25 (02 May 2020 10:00) (13 - 33)  SpO2: 93% (02 May 2020 10:00) (84% - 99%)    PHYSICAL EXAM:    General: WDWN  HEENT: NC/AT; PERRL, anicteric sclera; MMM  Neck: supple  Cardiovascular: +S1/S2; RRR  Respiratory: CTA B/L; no W/R/R  Gastrointestinal: soft, NT/ND; +BSx4  Extremities: WWP; no edema, clubbing or cyanosis  Vascular: 2+ radial, DP/PT pulses B/L  Neurological: AAOx3; no focal deficits    MEDICATIONS:  MEDICATIONS  (STANDING):  allopurinol 300 milliGRAM(s) Oral daily  atorvastatin 20 milliGRAM(s) Oral at bedtime  bisacodyl Suppository 10 milliGRAM(s) Rectal daily  cholecalciferol 1000 Unit(s) Oral every 24 hours  dextrose 5%. 1000 milliLiter(s) (50 mL/Hr) IV Continuous <Continuous>  dextrose 5%. 1000 milliLiter(s) (75 mL/Hr) IV Continuous <Continuous>  dextrose 50% Injectable 12.5 Gram(s) IV Push once  dextrose 50% Injectable 25 Gram(s) IV Push once  dextrose 50% Injectable 25 Gram(s) IV Push once  DULoxetine 20 milliGRAM(s) Oral two times a day  enoxaparin Injectable 40 milliGRAM(s) SubCutaneous every 24 hours  insulin glargine Injectable (LANTUS) 18 Unit(s) SubCutaneous at bedtime  insulin lispro (HumaLOG) corrective regimen sliding scale   SubCutaneous Before meals and at bedtime  levothyroxine Injectable 100 MICROGram(s) IV Push at bedtime  melatonin 10 milliGRAM(s) Oral at bedtime  OLANZapine 5 milliGRAM(s) Oral at bedtime  pantoprazole    Tablet 40 milliGRAM(s) Oral before breakfast  piperacillin/tazobactam IVPB.. 4.5 Gram(s) IV Intermittent every 6 hours  polyethylene glycol 3350 17 Gram(s) Oral daily  predniSONE   Tablet 40 milliGRAM(s) Oral daily  senna 2 Tablet(s) Oral at bedtime  sodium bicarbonate 650 milliGRAM(s) Oral three times a day  trimethoprim  160 mG/sulfamethoxazole 800 mG 1 Tablet(s) Oral <User Schedule>    MEDICATIONS  (PRN):  acetaminophen   Tablet .. 650 milliGRAM(s) Oral every 6 hours PRN Temp greater or equal to 38C (100.4F)  dextrose 40% Gel 15 Gram(s) Oral once PRN Blood Glucose LESS THAN 70 milliGRAM(s)/deciliter  glucagon  Injectable 1 milliGRAM(s) IntraMuscular once PRN Glucose LESS THAN 70 milligrams/deciliter      ALLERGIES:  Allergies    No Known Allergies    Intolerances        LABS:                        16.6   6.31  )-----------( 240      ( 02 May 2020 05:42 )             51.8     05-02    157<H>  |  122<H>  |  46<H>  ----------------------------<  218<H>  4.4   |  25  |  1.52<H>    Ca    9.4      02 May 2020 05:42  Phos  4.1     05-02  Mg     2.7     05-02    TPro  6.0  /  Alb  3.2<L>  /  TBili  0.4  /  DBili  x   /  AST  34  /  ALT  53<H>  /  AlkPhos  48  05-02    PT/INR - ( 02 May 2020 05:42 )   PT: 14.8 sec;   INR: 1.29          PTT - ( 02 May 2020 05:42 )  PTT:24.7 sec    CAPILLARY BLOOD GLUCOSE      POCT Blood Glucose.: 223 mg/dL (02 May 2020 05:34)      RADIOLOGY & ADDITIONAL TESTS: Reviewed.    ASSESSMENT:    PLAN: OVERNIGHT EVENTS: Haldol 2.5mg overnight.     SUBJECTIVE / INTERVAL HPI: Patient seen and examined at bedside.     VITAL SIGNS:  Vital Signs Last 24 Hrs  T(C): 36 (02 May 2020 04:00), Max: 36.1 (01 May 2020 18:08)  T(F): 96.8 (02 May 2020 04:00), Max: 97 (01 May 2020 18:08)  HR: 69 (02 May 2020 10:00) (57 - 77)  BP: 157/76 (02 May 2020 10:00) (113/65 - 157/76)  BP(mean): 109 (02 May 2020 10:00) (83 - 111)  RR: 25 (02 May 2020 10:00) (13 - 33)  SpO2: 93% (02 May 2020 10:00) (84% - 99%)    PHYSICAL EXAM:  Per critical care attending.     MEDICATIONS:  MEDICATIONS  (STANDING):  allopurinol 300 milliGRAM(s) Oral daily  atorvastatin 20 milliGRAM(s) Oral at bedtime  bisacodyl Suppository 10 milliGRAM(s) Rectal daily  cholecalciferol 1000 Unit(s) Oral every 24 hours  dextrose 5%. 1000 milliLiter(s) (50 mL/Hr) IV Continuous <Continuous>  dextrose 5%. 1000 milliLiter(s) (75 mL/Hr) IV Continuous <Continuous>  dextrose 50% Injectable 12.5 Gram(s) IV Push once  dextrose 50% Injectable 25 Gram(s) IV Push once  dextrose 50% Injectable 25 Gram(s) IV Push once  DULoxetine 20 milliGRAM(s) Oral two times a day  enoxaparin Injectable 40 milliGRAM(s) SubCutaneous every 24 hours  insulin glargine Injectable (LANTUS) 18 Unit(s) SubCutaneous at bedtime  insulin lispro (HumaLOG) corrective regimen sliding scale   SubCutaneous Before meals and at bedtime  levothyroxine Injectable 100 MICROGram(s) IV Push at bedtime  melatonin 10 milliGRAM(s) Oral at bedtime  OLANZapine 5 milliGRAM(s) Oral at bedtime  pantoprazole    Tablet 40 milliGRAM(s) Oral before breakfast  piperacillin/tazobactam IVPB.. 4.5 Gram(s) IV Intermittent every 6 hours  polyethylene glycol 3350 17 Gram(s) Oral daily  predniSONE   Tablet 40 milliGRAM(s) Oral daily  senna 2 Tablet(s) Oral at bedtime  sodium bicarbonate 650 milliGRAM(s) Oral three times a day  trimethoprim  160 mG/sulfamethoxazole 800 mG 1 Tablet(s) Oral <User Schedule>    MEDICATIONS  (PRN):  acetaminophen   Tablet .. 650 milliGRAM(s) Oral every 6 hours PRN Temp greater or equal to 38C (100.4F)  dextrose 40% Gel 15 Gram(s) Oral once PRN Blood Glucose LESS THAN 70 milliGRAM(s)/deciliter  glucagon  Injectable 1 milliGRAM(s) IntraMuscular once PRN Glucose LESS THAN 70 milligrams/deciliter      ALLERGIES:  Allergies    No Known Allergies    Intolerances        LABS:                        16.6   6.31  )-----------( 240      ( 02 May 2020 05:42 )             51.8     05-02    157<H>  |  122<H>  |  46<H>  ----------------------------<  218<H>  4.4   |  25  |  1.52<H>    Ca    9.4      02 May 2020 05:42  Phos  4.1     05-02  Mg     2.7     05-02    TPro  6.0  /  Alb  3.2<L>  /  TBili  0.4  /  DBili  x   /  AST  34  /  ALT  53<H>  /  AlkPhos  48  05-02    PT/INR - ( 02 May 2020 05:42 )   PT: 14.8 sec;   INR: 1.29          PTT - ( 02 May 2020 05:42 )  PTT:24.7 sec    CAPILLARY BLOOD GLUCOSE      POCT Blood Glucose.: 223 mg/dL (02 May 2020 05:34)      RADIOLOGY & ADDITIONAL TESTS: Reviewed.    ASSESSMENT:    PLAN:

## 2020-05-02 NOTE — PROGRESS NOTE ADULT - ASSESSMENT
75M PMHx of DM presented for 1 week of fevers. Patient's wife reported fall 1 week ago hitting head and noted increased fatigue/sleepiness. Found to be COVID+, with hyponatremia at [facility]. Admitted to [facility] for management of hyponatremia and AHRF 2/2 COVID-19, stepped up to ICU for closer monitoring.

## 2020-05-02 NOTE — PROGRESS NOTE ADULT - SUBJECTIVE AND OBJECTIVE BOX
CC: AMS      INTERVAL HISTORY:  labs and chart reviewed      ROS: No chest pain, no sob, no abd pain. No n/v/d    PAST MEDICAL & SURGICAL HISTORY:  Type 2 diabetes mellitus      PHYSICAL EXAM:  T(C): 36.9 (05-02-20 @ 11:00), Max: 36.9 (05-02-20 @ 11:00)  HR: 78 (05-02-20 @ 11:00)  BP: 134/67 (05-02-20 @ 11:00) (113/65 - 157/76)  RR: 20 (05-02-20 @ 11:00)  SpO2: 95% (05-02-20 @ 11:00)  Wt(kg): --  I&O's Summary    01 May 2020 07:01  -  02 May 2020 07:00  --------------------------------------------------------  IN: 1000 mL / OUT: 1750 mL / NET: -750 mL      Weight   General: AAO x 3,  NAD.  HEENT: moist mucous membranes, no pallor/cyanosis.  Neck: no JVD visible.  Cardiac: S1, S2. RRR. No murmurs   Respratory: CTA b/l, no access muscle use.   Abdomen: soft. nontender. nondistended  Skin: no rashes.  Extremities: no LE edema b/l  Access:       DATA:                        16.6   6.31  )-----------( 240      ( 02 May 2020 05:42 )             51.8<H>    Ferritin, Serum: 927 ng/mL <H> (05-02 @ 05:42)      157<H>  |  122<H>  |  46<H>  ----------------------------<  218<H>  Ca:9.4   (02 May 2020 05:42)  4.4     |  25     |  1.52<H>      eGFR if Non : 44 <L>  eGFR if : 51 <L>    TPro  6.0    /  Alb  3.2<L>  /  TBili  0.4    /  DBili  x      /  AST  34     /  ALT  53<H>  /  AlkPhos  48     02 May 2020 05:42                    MEDICATIONS  (STANDING):  allopurinol 300 milliGRAM(s) Oral daily  atorvastatin 20 milliGRAM(s) Oral at bedtime  bisacodyl Suppository 10 milliGRAM(s) Rectal daily  cholecalciferol 1000 Unit(s) Oral every 24 hours  dextrose 5%. 1000 milliLiter(s) (50 mL/Hr) IV Continuous <Continuous>  dextrose 5%. 1000 milliLiter(s) (75 mL/Hr) IV Continuous <Continuous>  dextrose 50% Injectable 12.5 Gram(s) IV Push once  dextrose 50% Injectable 25 Gram(s) IV Push once  dextrose 50% Injectable 25 Gram(s) IV Push once  DULoxetine 20 milliGRAM(s) Oral two times a day  enoxaparin Injectable 40 milliGRAM(s) SubCutaneous every 24 hours  insulin glargine Injectable (LANTUS) 18 Unit(s) SubCutaneous at bedtime  insulin lispro (HumaLOG) corrective regimen sliding scale   SubCutaneous Before meals and at bedtime  levothyroxine Injectable 100 MICROGram(s) IV Push at bedtime  melatonin 10 milliGRAM(s) Oral at bedtime  OLANZapine 5 milliGRAM(s) Oral at bedtime  pantoprazole    Tablet 40 milliGRAM(s) Oral before breakfast  piperacillin/tazobactam IVPB.. 4.5 Gram(s) IV Intermittent every 6 hours  polyethylene glycol 3350 17 Gram(s) Oral daily  predniSONE   Tablet 40 milliGRAM(s) Oral daily  senna 2 Tablet(s) Oral at bedtime  sodium bicarbonate 650 milliGRAM(s) Oral three times a day  trimethoprim  160 mG/sulfamethoxazole 800 mG 1 Tablet(s) Oral <User Schedule>    MEDICATIONS  (PRN):  acetaminophen   Tablet .. 650 milliGRAM(s) Oral every 6 hours PRN Temp greater or equal to 38C (100.4F)  dextrose 40% Gel 15 Gram(s) Oral once PRN Blood Glucose LESS THAN 70 milliGRAM(s)/deciliter  glucagon  Injectable 1 milliGRAM(s) IntraMuscular once PRN Glucose LESS THAN 70 milligrams/deciliter

## 2020-05-02 NOTE — PROGRESS NOTE ADULT - ASSESSMENT
75M PMHx of DM presented for 1 week of fevers. Patient's wife reported fall 1 week ago hitting head and noted increased fatigue/sleepiness. [facility] COVID[facility] Admitted to [facility] for management of hyponatremia and AHRF 2/2 COVID-19, stepped up to ICU for closer monitoring.     NEUROLOGY: LAURENCE   AAOx3, conversive.  -Patient attempting to get out of bed at nighttime, stating he is going home, no known history of dementia.    #Depression  -c/w home cymbalta  -will consult psych as pt depressed on exam     #Neuropathy  Hx of neuropathy on duloxetine and gabapentin  -c/w home cymbalta    #Delirium  Patient noted to be sundowning and often pulling off mask at nighttime.   - c/w zyprexa, no haldol (prolonged qtc) or ativan   - monitor QTc    CARDIOVASCULAR  #Hypertension  Patient with history of HTN on home losartan and atenolol  - holding losartan in setting of liz and COVID  - holding home norvasc 5 mg    RESPIRATORY   #Acute Hypoxic Respiratory Failure 2/2 COVID-19   Pt with increasing O2 requirements, desaturating on HFNC, requiring BiPAP  - CXR 4/25 unchanged from prior day  - Continue to trend inflammatory markers  - s/p hydroxychloroquine and azithromycin  - Placed on cefpodoxime (4/21) to cover CAP, switched to zosyn (completed on 4/27)  - s/p Toci 4/24  - awake proning/laying on right side   - HFNC during day, BiPAP at night  - Doing well on HFNC, may be 2/2 3 day course of steroids. Will consider continuing steroids tomorrow    INFECTIOUS DISEASE  #COVID-19 Pneumonia   COVID-19 + 4/21.  -f/u procalcitonin to eval for bacterial infection  -f/u LDH, CRP, ferritin, d-dimer, haptoglobin, CK, G6PD, ESR/CRP, fibrinogen, QuantiFeron  -contact/airborne precaution  -tylenol PRN for fever  -Robitussin PRN for cough  -avoid NSAIDs  -no nebulizers, MDI only  - awake proning  - lay on right side     RENAL/METABOLIC   #LIZ   Noted Cr at 1.91, unknown baseline but noted improvement to 1.64 with 3L IVF. Possible COVID component to kidney injury as well.   - urine lytes -> prerenal  - creatinine improving  - Hold home losartan in setting of LIZ  - avoid nephrotoxic agents    #Hypernatremia   Na 153  -started D5W 50cc/hr  -c/w frequent finger stick monitoring  -trend Na    GI:  - Protonix 40mg for prophylaxis  - no bm's, started bowel regimen    #Dysphagia  Failed bedside dysphagia.  - speech and swallow w/ recommendation of puree/nectar thick   - hold PM feed for BiPAP     ENDOCRINE:  #Hypothyroidism  Hx of hypothyroidism on synthroid  -c/w home synthroid dose  -tsh normal    #Type 2 Diabetes   Hx of DM on basal 28U and premeal 6U, also on statin, ARB  - Mod ISS  - c/w Lantus 18 qHS  - A1C 6.6   - finger sticks q4 hr     Heme  #Elevated d-dimer  -rising d-dimer most likely 2/2 covid  -will ordered dopplers lower extremity    PROPHYLACTIC MEASURE:  F: tolerating PO, no IVF  E: replete K<4, Mg<2  N: NPO in setting of BiPAP     VTE Prophylaxis: Lovenox SubQ  C: Full Code  D: COVID-MICU 75M PMHx of DM presented for 1 week of fevers. Patient's wife reported fall 1 week ago hitting head and noted increased fatigue/sleepiness. [facility] Ad[facility] to [facility] for management of hyponatremia and AHRF 2/2 COVID-19, stepped up to ICU for closer monitoring.     NEUROLOGY: LAURENCE   AAOx3, conversive.  -Patient attempting to get out of bed at nighttime, stating he is going home, no known history of dementia.    #Depression  -home cymbalta increased to 20mg per psych recommendation   -will consult psych as pt depressed on exam     #Neuropathy  Hx of neuropathy on duloxetine and gabapentin  -home cymbalta increased to 20mg per psych recommendation     #Delirium  Patient noted to be sundowning and often pulling off mask at nighttime.   - c/w zyprexa, no haldol (prolonged qtc) or ativan   - monitor QTc    CARDIOVASCULAR  #Hypertension  Patient with history of HTN on home losartan and atenolol  - holding losartan in setting of liz and COVID  - holding home norvasc 5 mg    RESPIRATORY   #Acute Hypoxic Respiratory Failure 2/2 COVID-19   Pt with increasing O2 requirements, desaturating on HFNC, requiring BiPAP  - CXR 4/25 unchanged from prior day  - Continue to trend inflammatory markers  - s/p hydroxychloroquine and azithromycin  - Placed on cefpodoxime (4/21) to cover CAP, switched to zosyn (completed on 4/27)  - s/p Toci 4/24  - awake proning/laying on right side   - NRB -> CPAP 6 at bedtime   - steroids 40mg PO qd     INFECTIOUS DISEASE  #COVID-19 Pneumonia   COVID-19 + 4/21.  -f/u procalcitonin to eval for bacterial infection  -f/u LDH, CRP, ferritin, d-dimer, haptoglobin, CK, G6PD, ESR/CRP, fibrinogen, QuantiFeron  -contact/airborne precaution  -tylenol PRN for fever  -Robitussin PRN for cough  -avoid NSAIDs  -no nebulizers, MDI only  - awake proning  - lay on right side     RENAL/METABOLIC   #LIZ   Noted Cr at 1.91, unknown baseline but noted improvement to 1.64 with 3L IVF. Possible COVID component to kidney injury as well.   - urine lytes -> prerenal  - creatinine improving  - Hold home losartan in setting of LIZ  - avoid nephrotoxic agents    #Hypernatremia   Na 153  - D5W 75c/hr  - c/w frequent finger stick monitoring  - trend Na    GI:  - Protonix 40mg for prophylaxis  - no bm's, started bowel regimen    #Dysphagia  Failed bedside dysphagia.  - speech and swallow w/ recommendation of puree/nectar thick   - hold PM feed for BiPAP     ENDOCRINE:  #Hypothyroidism  Hx of hypothyroidism on synthroid  -c/w home synthroid dose  -tsh normal    #Type 2 Diabetes   Hx of DM on basal 28U and premeal 6U, also on statin, ARB  - Mod ISS  - c/w Lantus 18 qHS  - A1C 6.6   - finger sticks q4 hr     Heme  #Elevated d-dimer  -rising d-dimer most likely 2/2 covid  -will ordered dopplers lower extremity    PROPHYLACTIC MEASURE:  F: tolerating PO, no IVF  E: replete K<4, Mg<2  N: NPO in setting of BiPAP     VTE Prophylaxis: Lovenox SubQ  C: Full Code  D: COVID-MICU 75M PMHx of DM presented for 1 week of fevers. Patient's wife reported fall 1 week ago hitting head and noted increased fatigue/sleepiness. [facility] management of hyponatremia and AHRF 2/2 COVID-19, stepped up to ICU for closer monitoring.     NEUROLOGY: LAURENCE   AAOx3, conversive.  -Patient attempting to get out of bed at nighttime, stating he is going home, no known history of dementia.  - frequent re-orientation     #Depression  -home cymbalta increased to 20mg per psych recommendation   - no suicidal ideation        #Neuropathy  Hx of neuropathy on duloxetine and gabapentin  -home cymbalta increased to 20mg per psych recommendation     #Delirium  Patient noted to be sundowning and often pulling off mask at nighttime.   - c/w zyprexa, no haldol (prolonged qtc) or ativan   - monitor QTc    CARDIOVASCULAR  - minimal pressor requirements in setting of sedation    #Hypertension  Patient with history of HTN on home losartan and atenolol  - holding losartan in setting of liz and COVID  - holding home norvasc 5 mg    RESPIRATORY   #Acute Hypoxic Respiratory Failure 2/2 COVID-19   Pt with increasing O2 requirements, desaturating on HFNC, requiring BiPAP  - CXR 4/25 unchanged from prior day  - Continue to trend inflammatory markers  - s/p hydroxychloroquine and azithromycin  - Placed on cefpodoxime (4/21) to cover CAP, switched to zosyn (completed on 4/27)  - s/p Toci 4/24  - awake proning/laying on right side   - NRB -> CPAP 6 at bedtime   - steroids 40mg PO qd     INFECTIOUS DISEASE  #COVID-19 Pneumonia   COVID-19 + 4/21.  -f/u procalcitonin to eval for bacterial infection  -f/u LDH, CRP, ferritin, d-dimer, haptoglobin, CK, G6PD, ESR/CRP, fibrinogen, QuantiFeron  -contact/airborne precaution  -tylenol PRN for fever  -Robitussin PRN for cough  -avoid NSAIDs  -no nebulizers, MDI only  - awake proning  - lay on right side     RENAL/METABOLIC   #LIZ   Noted Cr at 1.91, unknown baseline but noted improvement to 1.64 with 3L IVF. Possible COVID component to kidney injury as well.   - urine lytes -> prerenal  - creatinine improving  - Hold home losartan in setting of LIZ  - avoid nephrotoxic agents    #Hypernatremia   Na 153  - D5W 75c/hr  - c/w frequent finger stick monitoring  - trend Na    GI:  - Protonix 40mg for prophylaxis  - no bm's, started bowel regimen    #Dysphagia  Failed bedside dysphagia.  - speech and swallow w/ recommendation of puree/nectar thick   - hold PM feed for BiPAP     ENDOCRINE:  #Hypothyroidism  Hx of hypothyroidism on synthroid  -c/w home synthroid dose  -tsh normal    #Type 2 Diabetes   Hx of DM on basal 28U and premeal 6U, also on statin, ARB  - Mod ISS  - c/w Lantus 18 qHS  - A1C 6.6   - finger sticks q4 hr     Heme  #Elevated d-dimer  -rising d-dimer most likely 2/2 covid  -will ordered dopplers lower extremity    PROPHYLACTIC MEASURE:  F: tolerating PO, no IVF  E: replete K<4, Mg<2  N: NPO in setting of BiPAP     VTE Prophylaxis: Lovenox SubQ  C: Full Code  D: COVID-MICU

## 2020-05-03 LAB
ALBUMIN SERPL ELPH-MCNC: 2.9 G/DL — LOW (ref 3.3–5)
ALP SERPL-CCNC: 49 U/L — SIGNIFICANT CHANGE UP (ref 40–120)
ALT FLD-CCNC: 51 U/L — HIGH (ref 10–45)
ANION GAP SERPL CALC-SCNC: 10 MMOL/L — SIGNIFICANT CHANGE UP (ref 5–17)
APTT BLD: 33.2 SEC — SIGNIFICANT CHANGE UP (ref 27.5–36.3)
AST SERPL-CCNC: 40 U/L — SIGNIFICANT CHANGE UP (ref 10–40)
BASE EXCESS BLDA CALC-SCNC: 0.5 MMOL/L — SIGNIFICANT CHANGE UP (ref -2–3)
BASOPHILS # BLD AUTO: 0.01 K/UL — SIGNIFICANT CHANGE UP (ref 0–0.2)
BASOPHILS NFR BLD AUTO: 0.1 % — SIGNIFICANT CHANGE UP (ref 0–2)
BILIRUB SERPL-MCNC: 0.5 MG/DL — SIGNIFICANT CHANGE UP (ref 0.2–1.2)
BUN SERPL-MCNC: 41 MG/DL — HIGH (ref 7–23)
CALCIUM SERPL-MCNC: 9 MG/DL — SIGNIFICANT CHANGE UP (ref 8.4–10.5)
CHLORIDE SERPL-SCNC: 113 MMOL/L — HIGH (ref 96–108)
CO2 SERPL-SCNC: 24 MMOL/L — SIGNIFICANT CHANGE UP (ref 22–31)
CREAT SERPL-MCNC: 1.28 MG/DL — SIGNIFICANT CHANGE UP (ref 0.5–1.3)
CRP SERPL-MCNC: 0.44 MG/DL — HIGH (ref 0–0.4)
D DIMER BLD IA.RAPID-MCNC: 608 NG/ML DDU — HIGH
EOSINOPHIL # BLD AUTO: 0 K/UL — SIGNIFICANT CHANGE UP (ref 0–0.5)
EOSINOPHIL NFR BLD AUTO: 0 % — SIGNIFICANT CHANGE UP (ref 0–6)
FERRITIN SERPL-MCNC: 963 NG/ML — HIGH (ref 30–400)
GAS PNL BLDA: SIGNIFICANT CHANGE UP
GLUCOSE SERPL-MCNC: 222 MG/DL — HIGH (ref 70–99)
HCO3 BLDA-SCNC: 25 MMOL/L — SIGNIFICANT CHANGE UP (ref 21–28)
HCT VFR BLD CALC: 45.9 % — SIGNIFICANT CHANGE UP (ref 39–50)
HGB BLD-MCNC: 14.7 G/DL — SIGNIFICANT CHANGE UP (ref 13–17)
IMM GRANULOCYTES NFR BLD AUTO: 0.8 % — SIGNIFICANT CHANGE UP (ref 0–1.5)
INR BLD: 1.24 — HIGH (ref 0.88–1.16)
LYMPHOCYTES # BLD AUTO: 0.32 K/UL — LOW (ref 1–3.3)
LYMPHOCYTES # BLD AUTO: 4.1 % — LOW (ref 13–44)
MAGNESIUM SERPL-MCNC: 2.4 MG/DL — SIGNIFICANT CHANGE UP (ref 1.6–2.6)
MCHC RBC-ENTMCNC: 30.9 PG — SIGNIFICANT CHANGE UP (ref 27–34)
MCHC RBC-ENTMCNC: 32 GM/DL — SIGNIFICANT CHANGE UP (ref 32–36)
MCV RBC AUTO: 96.6 FL — SIGNIFICANT CHANGE UP (ref 80–100)
MONOCYTES # BLD AUTO: 0.14 K/UL — SIGNIFICANT CHANGE UP (ref 0–0.9)
MONOCYTES NFR BLD AUTO: 1.8 % — LOW (ref 2–14)
NEUTROPHILS # BLD AUTO: 7.26 K/UL — SIGNIFICANT CHANGE UP (ref 1.8–7.4)
NEUTROPHILS NFR BLD AUTO: 93.2 % — HIGH (ref 43–77)
NRBC # BLD: 0 /100 WBCS — SIGNIFICANT CHANGE UP (ref 0–0)
PCO2 BLDA: 41 MMHG — SIGNIFICANT CHANGE UP (ref 35–48)
PH BLDA: 7.41 — SIGNIFICANT CHANGE UP (ref 7.35–7.45)
PHOSPHATE SERPL-MCNC: 3.1 MG/DL — SIGNIFICANT CHANGE UP (ref 2.5–4.5)
PLATELET # BLD AUTO: 229 K/UL — SIGNIFICANT CHANGE UP (ref 150–400)
PO2 BLDA: 125 MMHG — HIGH (ref 83–108)
POTASSIUM SERPL-MCNC: 5.2 MMOL/L — SIGNIFICANT CHANGE UP (ref 3.5–5.3)
POTASSIUM SERPL-SCNC: 5.2 MMOL/L — SIGNIFICANT CHANGE UP (ref 3.5–5.3)
PROCALCITONIN SERPL-MCNC: 0.02 NG/ML — SIGNIFICANT CHANGE UP (ref 0.02–0.1)
PROT SERPL-MCNC: 5.7 G/DL — LOW (ref 6–8.3)
PROTHROM AB SERPL-ACNC: 14.2 SEC — HIGH (ref 10–12.9)
RBC # BLD: 4.75 M/UL — SIGNIFICANT CHANGE UP (ref 4.2–5.8)
RBC # FLD: 14.9 % — HIGH (ref 10.3–14.5)
SAO2 % BLDA: 98 % — SIGNIFICANT CHANGE UP (ref 95–100)
SODIUM SERPL-SCNC: 147 MMOL/L — HIGH (ref 135–145)
WBC # BLD: 7.79 K/UL — SIGNIFICANT CHANGE UP (ref 3.8–10.5)
WBC # FLD AUTO: 7.79 K/UL — SIGNIFICANT CHANGE UP (ref 3.8–10.5)

## 2020-05-03 PROCEDURE — 71045 X-RAY EXAM CHEST 1 VIEW: CPT | Mod: 26

## 2020-05-03 PROCEDURE — 99291 CRITICAL CARE FIRST HOUR: CPT | Mod: CS

## 2020-05-03 RX ADMIN — PANTOPRAZOLE SODIUM 40 MILLIGRAM(S): 20 TABLET, DELAYED RELEASE ORAL at 05:52

## 2020-05-03 RX ADMIN — DULOXETINE HYDROCHLORIDE 20 MILLIGRAM(S): 30 CAPSULE, DELAYED RELEASE ORAL at 17:01

## 2020-05-03 RX ADMIN — Medication 2: at 21:46

## 2020-05-03 RX ADMIN — Medication 1000 UNIT(S): at 17:01

## 2020-05-03 RX ADMIN — Medication 100 MICROGRAM(S): at 21:44

## 2020-05-03 RX ADMIN — Medication 2: at 06:13

## 2020-05-03 RX ADMIN — INSULIN GLARGINE 18 UNIT(S): 100 INJECTION, SOLUTION SUBCUTANEOUS at 21:43

## 2020-05-03 RX ADMIN — Medication 40 MILLIGRAM(S): at 05:51

## 2020-05-03 RX ADMIN — ATORVASTATIN CALCIUM 20 MILLIGRAM(S): 80 TABLET, FILM COATED ORAL at 21:43

## 2020-05-03 RX ADMIN — PIPERACILLIN AND TAZOBACTAM 200 GRAM(S): 4; .5 INJECTION, POWDER, LYOPHILIZED, FOR SOLUTION INTRAVENOUS at 23:45

## 2020-05-03 RX ADMIN — PIPERACILLIN AND TAZOBACTAM 200 GRAM(S): 4; .5 INJECTION, POWDER, LYOPHILIZED, FOR SOLUTION INTRAVENOUS at 00:51

## 2020-05-03 RX ADMIN — PIPERACILLIN AND TAZOBACTAM 200 GRAM(S): 4; .5 INJECTION, POWDER, LYOPHILIZED, FOR SOLUTION INTRAVENOUS at 12:09

## 2020-05-03 RX ADMIN — Medication 6: at 11:52

## 2020-05-03 RX ADMIN — Medication 300 MILLIGRAM(S): at 12:09

## 2020-05-03 RX ADMIN — ENOXAPARIN SODIUM 40 MILLIGRAM(S): 100 INJECTION SUBCUTANEOUS at 17:01

## 2020-05-03 RX ADMIN — DULOXETINE HYDROCHLORIDE 20 MILLIGRAM(S): 30 CAPSULE, DELAYED RELEASE ORAL at 05:51

## 2020-05-03 RX ADMIN — POLYETHYLENE GLYCOL 3350 17 GRAM(S): 17 POWDER, FOR SOLUTION ORAL at 12:09

## 2020-05-03 RX ADMIN — PIPERACILLIN AND TAZOBACTAM 200 GRAM(S): 4; .5 INJECTION, POWDER, LYOPHILIZED, FOR SOLUTION INTRAVENOUS at 05:50

## 2020-05-03 RX ADMIN — Medication 10 MILLIGRAM(S): at 09:45

## 2020-05-03 RX ADMIN — PIPERACILLIN AND TAZOBACTAM 200 GRAM(S): 4; .5 INJECTION, POWDER, LYOPHILIZED, FOR SOLUTION INTRAVENOUS at 17:01

## 2020-05-03 RX ADMIN — OLANZAPINE 5 MILLIGRAM(S): 15 TABLET, FILM COATED ORAL at 21:44

## 2020-05-03 RX ADMIN — SENNA PLUS 2 TABLET(S): 8.6 TABLET ORAL at 21:44

## 2020-05-03 RX ADMIN — Medication 2: at 15:53

## 2020-05-03 NOTE — PROGRESS NOTE ADULT - ATTENDING COMMENTS
75 year old male with h/o DM, never smoker with acute hypoxic respiratory failure with COVID pneumonitis with hypernatremia (improving).  Plan:  - Improvement in hypoxia. Patient was able to tolerate nasal cannula today. He did not tolerate CPAP last night.  - Feeding during daytime. Avoid dinner to prevent aspiration with use of CPAP.  - D/c D5W. Continue Lantus for DM.  - Patient has delirium during night time. QT was prolonged. d/c Seroquel. Avoid haldol. Zyprexa to continue.  - Duloxetine 20 mg bid for depression.  - OOB to chair  - Oral prednisone 40 mg daily to continue for now.  - PCP prophylaxis  - D/c zosyn after 7 days

## 2020-05-03 NOTE — PROGRESS NOTE ADULT - ASSESSMENT
75M PMHx male with DM presented for 1 week of fevers. Patient's wife reported fall 1 week ago hitting head and noted increased fatigue/sleepiness. Found to be COVID+, with hyponatremia at [facility]. Admitted to [facility] for management of hyponatremia and AHRF 2/2 COVID-19, stepped up to ICU for closer monitoring.

## 2020-05-03 NOTE — PROGRESS NOTE ADULT - ASSESSMENT
75M PMHx of DM presented for 1 week of fevers. Patient's wife reported fall 1 week ago hitting head and noted increased fatigue/sleepiness. [facility] to be COVID[facility] Ad[facility] to [facility] for management of hyponatremia and AHRF 2/2 COVID-19, stepped up to ICU for closer monitoring.     NEUROLOGY: LAURENCE   AAOx3, conversive.  -Patient attempting to get out of bed at nighttime, stating he is going home, no known history of dementia.  - frequent re-orientation     #Depression  -home cymbalta increased to 20mg per psych recommendation   - no suicidal ideation        #Neuropathy  Hx of neuropathy on duloxetine and gabapentin  -home cymbalta increased to 20mg per psych recommendation     #Delirium  Patient noted to be sundowning and often pulling off mask at nighttime.   - c/w zyprexa, no haldol (prolonged qtc) or ativan   - monitor QTc    CARDIOVASCULAR  - minimal pressor requirements in setting of sedation    #Hypertension  Patient with history of HTN on home losartan and atenolol  - holding losartan in setting of liz and COVID  - holding home norvasc 5 mg    RESPIRATORY   #Acute Hypoxic Respiratory Failure 2/2 COVID-19   Pt with increasing O2 requirements, desaturating on HFNC, requiring BiPAP  - CXR 4/25 unchanged from prior day  - Continue to trend inflammatory markers  - s/p hydroxychloroquine and azithromycin  - Placed on cefpodoxime (4/21) to cover CAP, switched to zosyn (completed on 4/27)  - s/p Toci 4/24  - awake proning/laying on right side   - NRB -> CPAP 6 at bedtime   - steroids 40mg PO qd     INFECTIOUS DISEASE  #COVID-19 Pneumonia   COVID-19 + 4/21.  -f/u procalcitonin to eval for bacterial infection  -f/u LDH, CRP, ferritin, d-dimer, haptoglobin, CK, G6PD, ESR/CRP, fibrinogen, QuantiFeron  -contact/airborne precaution  -tylenol PRN for fever  -Robitussin PRN for cough  -avoid NSAIDs  -no nebulizers, MDI only  - awake proning  - lay on right side     RENAL/METABOLIC   #LIZ   Noted Cr at 1.91, unknown baseline but noted improvement to 1.64 with 3L IVF. Possible COVID component to kidney injury as well.   - urine lytes -> prerenal  - creatinine improving  - Hold home losartan in setting of LIZ  - avoid nephrotoxic agents    #Hypernatremia   Na 153  - D5W 75c/hr  - c/w frequent finger stick monitoring  - trend Na    GI:  - Protonix 40mg for prophylaxis  - no bm's, started bowel regimen    #Dysphagia  Failed bedside dysphagia.  - speech and swallow w/ recommendation of puree/nectar thick   - hold PM feed for BiPAP     ENDOCRINE:  #Hypothyroidism  Hx of hypothyroidism on synthroid  -c/w home synthroid dose  -tsh normal    #Type 2 Diabetes   Hx of DM on basal 28U and premeal 6U, also on statin, ARB  - Mod ISS  - c/w Lantus 18 qHS  - A1C 6.6   - finger sticks q4 hr     Heme  #Elevated d-dimer  -rising d-dimer most likely 2/2 covid  -will ordered dopplers lower extremity    PROPHYLACTIC MEASURE:  F: tolerating PO, no IVF  E: replete K<4, Mg<2  N: NPO in setting of BiPAP     VTE Prophylaxis: Lovenox SubQ  C: Full Code  D: COVID-MICU 75M PMHx of DM presented for 1 week of fevers. Patient's wife reported fall 1 week ago hitting head and noted increased fatigue/sleepiness. [facility] management of hyponatremia and AHRF 2/2 COVID-19, stepped up to ICU for closer monitoring.     NEUROLOGY: LAURENCE   AAOx3, conversive.  -Patient attempting to get out of bed at nighttime, stating he is going home, no known history of dementia.  - frequent re-orientation     #Depression  -home cymbalta increased to 20mg BID per psych recommendation   - no suicidal ideation      #Neuropathy  Hx of neuropathy on duloxetine and gabapentin  - home cymbalta increased to 20mg BID per psych recommendation   - hold off on gabapentin     #Delirium  Patient noted to be sundowning and often pulling off mask at nighttime.   - c/w zyprexa, no haldol (prolonged qtc) or ativan   - monitor QTc    CARDIOVASCULAR  #Hypertension  Patient with history of HTN on home losartan and atenolol  - holding losartan in setting of liz and COVID  - holding home norvasc 5 mg    RESPIRATORY   #Acute Hypoxic Respiratory Failure 2/2 COVID-19   Pt with increasing O2 requirements, desaturating on HFNC, requiring BiPAP  - CXR 4/25 unchanged from prior day  - Continue to trend inflammatory markers  - s/p hydroxychloroquine and azithromycin  - Placed on cefpodoxime (4/21) to cover CAP, switched to zosyn (completed on 4/27)  - s/p Toci 4/24  - awake proning/laying on right side   - NRB -> CPAP 6 at bedtime -> trial on RA (6L)   - steroids 40mg PO qd     INFECTIOUS DISEASE  #COVID-19 Pneumonia   COVID-19 + 4/21.  -f/u procalcitonin to eval for bacterial infection  -f/u LDH, CRP, ferritin, d-dimer, haptoglobin, CK, G6PD, ESR/CRP, fibrinogen, QuantiFeron  -contact/airborne precaution  -tylenol PRN for fever  -Robitussin PRN for cough  -avoid NSAIDs  -no nebulizers, MDI only  - awake proning  - lay on right side     RENAL/METABOLIC   #LIZ   Noted Cr at 1.91, unknown baseline but noted improvement to 1.64 with 3L IVF. Possible COVID component to kidney injury as well.   - urine lytes -> prerenal  - creatinine improving  - Hold home losartan in setting of LIZ  - avoid nephrotoxic agents    #Hypernatremia   Na 153 ->157 -> 149 -> 147   - encourage water intake   - c/w frequent finger stick monitoring (q4hr fingersticks)   - trend Na    GI:  - Protonix 40mg for prophylaxis  - no bm's, started bowel regimen    #Dysphagia  Failed bedside dysphagia.  - speech and swallow w/ recommendation of puree/nectar thick   - hold PM feed for BiPAP     ENDOCRINE:  #Hypothyroidism  Hx of hypothyroidism on synthroid  -c/w home synthroid dose  -tsh normal    #Type 2 Diabetes   Hx of DM on basal 28U and premeal 6U, also on statin, ARB  - Mod ISS  - c/w Lantus 18 qHS  - A1C 6.6   - finger sticks q4 hr     Heme  #Elevated d-dimer  -rising d-dimer most likely 2/2 covid  -will ordered dopplers lower extremity    PROPHYLACTIC MEASURE:  F: tolerating PO, no IVF  E: replete K<4, Mg<2  N: NPO in setting of BiPAP     VTE Prophylaxis: Lovenox SubQ  C: Full Code  D: COVID-MICU 75M PMHx of DM presented for 1 week of fevers. Patient's wife reported fall 1 week ago hitting head and noted increased fatigue/sleepiness. [facility] for management of hyponatremia and AHRF 2/2 COVID-19, stepped up to ICU for closer monitoring.     NEUROLOGY: LAURENCE   AAOx3, conversive.  -Patient attempting to get out of bed at nighttime, stating he is going home, no known history of dementia.  - frequent re-orientation     #Depression  -home cymbalta increased to 20mg BID per psych recommendation   - no suicidal ideation      #Neuropathy  Hx of neuropathy on duloxetine and gabapentin  - home cymbalta increased to 20mg BID per psych recommendation   - hold off on gabapentin     #Delirium  Patient noted to be sundowning and often pulling off mask at nighttime.   - c/w zyprexa, no haldol (prolonged qtc) or ativan   - monitor QTc    CARDIOVASCULAR  #Hypertension  Patient with history of HTN on home losartan and atenolol  - holding losartan in setting of liz and COVID  - holding home norvasc 5 mg    RESPIRATORY   #Acute Hypoxic Respiratory Failure 2/2 COVID-19   Pt with increasing O2 requirements, desaturating on HFNC, requiring BiPAP  - CXR 4/25 unchanged from prior day  - Continue to trend inflammatory markers  - s/p hydroxychloroquine and azithromycin  - Placed on cefpodoxime (4/21) to cover CAP, switched to zosyn (completed on 4/27)  - s/p Toci 4/24  - awake proning/laying on right side   - NRB -> CPAP 6 at bedtime -> trial on RA (6L)   - steroids 40mg PO qd     INFECTIOUS DISEASE  #COVID-19 Pneumonia   COVID-19 + 4/21.  -f/u procalcitonin to eval for bacterial infection  -f/u LDH, CRP, ferritin, d-dimer, haptoglobin, CK, G6PD, ESR/CRP, fibrinogen, QuantiFeron  -contact/airborne precaution  -tylenol PRN for fever  -Robitussin PRN for cough  -avoid NSAIDs  -no nebulizers, MDI only  - awake proning  - lay on right side     RENAL/METABOLIC   #LIZ   Noted Cr at 1.91, unknown baseline but noted improvement to 1.64 with 3L IVF. Possible COVID component to kidney injury as well.   - urine lytes -> prerenal  - creatinine improving  - Hold home losartan in setting of LIZ  - avoid nephrotoxic agents    #Hypernatremia   Na 153 ->157 -> 149 -> 147   - encourage water intake   - c/w frequent finger stick monitoring (q4hr fingersticks)   - trend Na    GI:  - Protonix 40mg for prophylaxis  - no bm's, started bowel regimen    #Dysphagia  Failed bedside dysphagia.  - speech and swallow w/ recommendation of puree/nectar thick   - hold PM feed for BiPAP     ENDOCRINE:  #Hypothyroidism  Hx of hypothyroidism on synthroid  -c/w home synthroid dose  -tsh normal    #Type 2 Diabetes   Hx of DM on basal 28U and premeal 6U, also on statin, ARB  - Mod ISS  - c/w Lantus 18 qHS  - A1C 6.6   - finger sticks q4 hr     Heme  #Elevated d-dimer  -rising d-dimer most likely 2/2 covid  -LE doppler negative for DVT    PROPHYLACTIC MEASURE:  F: tolerating PO, no IVF  E: replete K<4, Mg<2  N: puree-nectar thick; hold dinner in setting of CPAP     VTE Prophylaxis: Lovenox SubQ  C: Full Code  D: COVID-MICU

## 2020-05-03 NOTE — PROGRESS NOTE ADULT - SUBJECTIVE AND OBJECTIVE BOX
**Incomplete Note**    OVERNIGHT EVENTS:    SUBJECTIVE / INTERVAL HPI: Patient seen and examined at bedside.     VITAL SIGNS:  Vital Signs Last 24 Hrs  T(C): 36.2 (03 May 2020 06:00), Max: 36.9 (02 May 2020 11:00)  T(F): 97.1 (03 May 2020 06:00), Max: 98.4 (02 May 2020 11:00)  HR: 64 (03 May 2020 07:00) (60 - 82)  BP: 147/87 (03 May 2020 07:00) (124/75 - 157/83)  BP(mean): 109 (03 May 2020 07:00) (56 - 110)  RR: 16 (03 May 2020 07:00) (13 - 36)  SpO2: 96% (03 May 2020 07:00) (89% - 99%)    PHYSICAL EXAM:    General: WDWN  HEENT: NC/AT; PERRL, anicteric sclera; MMM  Neck: supple  Cardiovascular: +S1/S2; RRR  Respiratory: CTA B/L; no W/R/R  Gastrointestinal: soft, NT/ND; +BSx4  Extremities: WWP; no edema, clubbing or cyanosis  Vascular: 2+ radial, DP/PT pulses B/L  Neurological: AAOx3; no focal deficits    MEDICATIONS:  MEDICATIONS  (STANDING):  allopurinol 300 milliGRAM(s) Oral daily  atorvastatin 20 milliGRAM(s) Oral at bedtime  bisacodyl Suppository 10 milliGRAM(s) Rectal daily  cholecalciferol 1000 Unit(s) Oral every 24 hours  dextrose 5%. 1000 milliLiter(s) (50 mL/Hr) IV Continuous <Continuous>  dextrose 5%. 1000 milliLiter(s) (75 mL/Hr) IV Continuous <Continuous>  dextrose 50% Injectable 12.5 Gram(s) IV Push once  dextrose 50% Injectable 25 Gram(s) IV Push once  dextrose 50% Injectable 25 Gram(s) IV Push once  DULoxetine 20 milliGRAM(s) Oral two times a day  enoxaparin Injectable 40 milliGRAM(s) SubCutaneous every 24 hours  insulin glargine Injectable (LANTUS) 18 Unit(s) SubCutaneous at bedtime  insulin lispro (HumaLOG) corrective regimen sliding scale   SubCutaneous Before meals and at bedtime  levothyroxine Injectable 100 MICROGram(s) IV Push at bedtime  OLANZapine 5 milliGRAM(s) Oral at bedtime  pantoprazole    Tablet 40 milliGRAM(s) Oral before breakfast  piperacillin/tazobactam IVPB.. 4.5 Gram(s) IV Intermittent every 6 hours  polyethylene glycol 3350 17 Gram(s) Oral daily  predniSONE   Tablet 40 milliGRAM(s) Oral daily  senna 2 Tablet(s) Oral at bedtime  trimethoprim  160 mG/sulfamethoxazole 800 mG 1 Tablet(s) Oral <User Schedule>    MEDICATIONS  (PRN):  acetaminophen   Tablet .. 650 milliGRAM(s) Oral every 6 hours PRN Temp greater or equal to 38C (100.4F)  dextrose 40% Gel 15 Gram(s) Oral once PRN Blood Glucose LESS THAN 70 milliGRAM(s)/deciliter  glucagon  Injectable 1 milliGRAM(s) IntraMuscular once PRN Glucose LESS THAN 70 milligrams/deciliter      ALLERGIES:  Allergies    No Known Allergies    Intolerances        LABS:                        14.7   7.79  )-----------( 229      ( 03 May 2020 05:35 )             45.9     05-03    147<H>  |  113<H>  |  41<H>  ----------------------------<  222<H>  5.2   |  24  |  1.28    Ca    9.0      03 May 2020 05:35  Phos  3.1     05-03  Mg     2.4     05-03    TPro  5.7<L>  /  Alb  2.9<L>  /  TBili  0.5  /  DBili  x   /  AST  40  /  ALT  51<H>  /  AlkPhos  49  05-03    PT/INR - ( 03 May 2020 05:35 )   PT: 14.2 sec;   INR: 1.24          PTT - ( 03 May 2020 05:35 )  PTT:33.2 sec    CAPILLARY BLOOD GLUCOSE      POCT Blood Glucose.: 200 mg/dL (03 May 2020 06:03)      RADIOLOGY & ADDITIONAL TESTS: Reviewed.    ASSESSMENT:    PLAN: OVERNIGHT EVENTS: Patient took off CPAP and placed himself on NRB, saturating 91-95% on 15L, comfortable.     SUBJECTIVE / INTERVAL HPI: Patient seen and examined by attending physician     VITAL SIGNS:  Vital Signs Last 24 Hrs  T(C): 36.2 (03 May 2020 06:00), Max: 36.9 (02 May 2020 11:00)  T(F): 97.1 (03 May 2020 06:00), Max: 98.4 (02 May 2020 11:00)  HR: 64 (03 May 2020 07:00) (60 - 82)  BP: 147/87 (03 May 2020 07:00) (124/75 - 157/83)  BP(mean): 109 (03 May 2020 07:00) (56 - 110)  RR: 16 (03 May 2020 07:00) (13 - 36)  SpO2: 96% (03 May 2020 07:00) (89% - 99%)    PHYSICAL EXAM:  Per critical care attending.     MEDICATIONS:  MEDICATIONS  (STANDING):  allopurinol 300 milliGRAM(s) Oral daily  atorvastatin 20 milliGRAM(s) Oral at bedtime  bisacodyl Suppository 10 milliGRAM(s) Rectal daily  cholecalciferol 1000 Unit(s) Oral every 24 hours  dextrose 5%. 1000 milliLiter(s) (50 mL/Hr) IV Continuous <Continuous>  dextrose 5%. 1000 milliLiter(s) (75 mL/Hr) IV Continuous <Continuous>  dextrose 50% Injectable 12.5 Gram(s) IV Push once  dextrose 50% Injectable 25 Gram(s) IV Push once  dextrose 50% Injectable 25 Gram(s) IV Push once  DULoxetine 20 milliGRAM(s) Oral two times a day  enoxaparin Injectable 40 milliGRAM(s) SubCutaneous every 24 hours  insulin glargine Injectable (LANTUS) 18 Unit(s) SubCutaneous at bedtime  insulin lispro (HumaLOG) corrective regimen sliding scale   SubCutaneous Before meals and at bedtime  levothyroxine Injectable 100 MICROGram(s) IV Push at bedtime  OLANZapine 5 milliGRAM(s) Oral at bedtime  pantoprazole    Tablet 40 milliGRAM(s) Oral before breakfast  piperacillin/tazobactam IVPB.. 4.5 Gram(s) IV Intermittent every 6 hours  polyethylene glycol 3350 17 Gram(s) Oral daily  predniSONE   Tablet 40 milliGRAM(s) Oral daily  senna 2 Tablet(s) Oral at bedtime  trimethoprim  160 mG/sulfamethoxazole 800 mG 1 Tablet(s) Oral <User Schedule>    MEDICATIONS  (PRN):  acetaminophen   Tablet .. 650 milliGRAM(s) Oral every 6 hours PRN Temp greater or equal to 38C (100.4F)  dextrose 40% Gel 15 Gram(s) Oral once PRN Blood Glucose LESS THAN 70 milliGRAM(s)/deciliter  glucagon  Injectable 1 milliGRAM(s) IntraMuscular once PRN Glucose LESS THAN 70 milligrams/deciliter      ALLERGIES:  Allergies    No Known Allergies    Intolerances        LABS:                        14.7   7.79  )-----------( 229      ( 03 May 2020 05:35 )             45.9     05-03    147<H>  |  113<H>  |  41<H>  ----------------------------<  222<H>  5.2   |  24  |  1.28    Ca    9.0      03 May 2020 05:35  Phos  3.1     05-03  Mg     2.4     05-03    TPro  5.7<L>  /  Alb  2.9<L>  /  TBili  0.5  /  DBili  x   /  AST  40  /  ALT  51<H>  /  AlkPhos  49  05-03    PT/INR - ( 03 May 2020 05:35 )   PT: 14.2 sec;   INR: 1.24          PTT - ( 03 May 2020 05:35 )  PTT:33.2 sec    CAPILLARY BLOOD GLUCOSE      POCT Blood Glucose.: 200 mg/dL (03 May 2020 06:03)      RADIOLOGY & ADDITIONAL TESTS: Reviewed.    ASSESSMENT:    PLAN:

## 2020-05-03 NOTE — PROGRESS NOTE ADULT - SUBJECTIVE AND OBJECTIVE BOX
CC: AMS      INTERVAL HISTORY:   chart and labs reviewed      ROS: No chest pain, no sob, no abd pain. No n/v/d    PAST MEDICAL & SURGICAL HISTORY:  Type 2 diabetes mellitus      PHYSICAL EXAM:  T(C): 36.2 (05-03-20 @ 06:00), Max: 36.9 (05-02-20 @ 11:00)  HR: 65 (05-03-20 @ 10:00)  BP: 116/66 (05-03-20 @ 10:00) (116/66 - 173/88)  RR: 15 (05-03-20 @ 10:00)  SpO2: 93% (05-03-20 @ 10:00)  Wt(kg): --  I&O's Summary    02 May 2020 07:01  -  03 May 2020 07:00  --------------------------------------------------------  IN: 3065 mL / OUT: 1025 mL / NET: 2040 mL    03 May 2020 07:01  -  03 May 2020 10:37  --------------------------------------------------------  IN: 75 mL / OUT: 0 mL / NET: 75 mL      Weight   General: AAO x 3,  NAD.  HEENT: moist mucous membranes, no pallor/cyanosis.  Neck: no JVD visible.  Cardiac: S1, S2. RRR. No murmurs   Respratory: CTA b/l, no access muscle use.   Abdomen: soft. nontender. nondistended  Skin: no rashes.  Extremities: no LE edema b/l  Access:       DATA:                        14.7   7.79  )-----------( 229      ( 03 May 2020 05:35 )             45.9     Ferritin, Serum: 963 ng/mL <H> (05-03 @ 05:35)      147<H>  |  113<H>  |  41<H>  ----------------------------<  222<H>  Ca:9.0   (03 May 2020 05:35)  5.2     |  24     |  1.28       eGFR if Non : 54 <L>  eGFR if : 63    TPro  5.7<L>  /  Alb  2.9<L>  /  TBili  0.5    /  DBili  x      /  AST  40     /  ALT  51<H>  /  AlkPhos  49     03 May 2020 05:35                    MEDICATIONS  (STANDING):  allopurinol 300 milliGRAM(s) Oral daily  atorvastatin 20 milliGRAM(s) Oral at bedtime  bisacodyl Suppository 10 milliGRAM(s) Rectal daily  cholecalciferol 1000 Unit(s) Oral every 24 hours  dextrose 5%. 1000 milliLiter(s) (50 mL/Hr) IV Continuous <Continuous>  dextrose 50% Injectable 12.5 Gram(s) IV Push once  dextrose 50% Injectable 25 Gram(s) IV Push once  dextrose 50% Injectable 25 Gram(s) IV Push once  DULoxetine 20 milliGRAM(s) Oral two times a day  enoxaparin Injectable 40 milliGRAM(s) SubCutaneous every 24 hours  insulin glargine Injectable (LANTUS) 18 Unit(s) SubCutaneous at bedtime  insulin lispro (HumaLOG) corrective regimen sliding scale   SubCutaneous Before meals and at bedtime  levothyroxine Injectable 100 MICROGram(s) IV Push at bedtime  OLANZapine 5 milliGRAM(s) Oral at bedtime  pantoprazole    Tablet 40 milliGRAM(s) Oral before breakfast  piperacillin/tazobactam IVPB.. 4.5 Gram(s) IV Intermittent every 6 hours  polyethylene glycol 3350 17 Gram(s) Oral daily  predniSONE   Tablet 40 milliGRAM(s) Oral daily  senna 2 Tablet(s) Oral at bedtime  trimethoprim  160 mG/sulfamethoxazole 800 mG 1 Tablet(s) Oral <User Schedule>    MEDICATIONS  (PRN):  acetaminophen   Tablet .. 650 milliGRAM(s) Oral every 6 hours PRN Temp greater or equal to 38C (100.4F)  dextrose 40% Gel 15 Gram(s) Oral once PRN Blood Glucose LESS THAN 70 milliGRAM(s)/deciliter  glucagon  Injectable 1 milliGRAM(s) IntraMuscular once PRN Glucose LESS THAN 70 milligrams/deciliter

## 2020-05-04 LAB
ALBUMIN SERPL ELPH-MCNC: 2.8 G/DL — LOW (ref 3.3–5)
ALP SERPL-CCNC: 47 U/L — SIGNIFICANT CHANGE UP (ref 40–120)
ALT FLD-CCNC: 42 U/L — SIGNIFICANT CHANGE UP (ref 10–45)
ANION GAP SERPL CALC-SCNC: 10 MMOL/L — SIGNIFICANT CHANGE UP (ref 5–17)
AST SERPL-CCNC: 24 U/L — SIGNIFICANT CHANGE UP (ref 10–40)
BASOPHILS # BLD AUTO: 0.01 K/UL — SIGNIFICANT CHANGE UP (ref 0–0.2)
BASOPHILS NFR BLD AUTO: 0.1 % — SIGNIFICANT CHANGE UP (ref 0–2)
BILIRUB SERPL-MCNC: 0.7 MG/DL — SIGNIFICANT CHANGE UP (ref 0.2–1.2)
BUN SERPL-MCNC: 36 MG/DL — HIGH (ref 7–23)
CALCIUM SERPL-MCNC: 9 MG/DL — SIGNIFICANT CHANGE UP (ref 8.4–10.5)
CHLORIDE SERPL-SCNC: 112 MMOL/L — HIGH (ref 96–108)
CO2 SERPL-SCNC: 26 MMOL/L — SIGNIFICANT CHANGE UP (ref 22–31)
CREAT SERPL-MCNC: 1.28 MG/DL — SIGNIFICANT CHANGE UP (ref 0.5–1.3)
CRP SERPL-MCNC: 0.27 MG/DL — SIGNIFICANT CHANGE UP (ref 0–0.4)
D DIMER BLD IA.RAPID-MCNC: 480 NG/ML DDU — HIGH
EOSINOPHIL # BLD AUTO: 0.04 K/UL — SIGNIFICANT CHANGE UP (ref 0–0.5)
EOSINOPHIL NFR BLD AUTO: 0.5 % — SIGNIFICANT CHANGE UP (ref 0–6)
FERRITIN SERPL-MCNC: 1030 NG/ML — HIGH (ref 30–400)
GLUCOSE SERPL-MCNC: 132 MG/DL — HIGH (ref 70–99)
HCT VFR BLD CALC: 45 % — SIGNIFICANT CHANGE UP (ref 39–50)
HGB BLD-MCNC: 14.6 G/DL — SIGNIFICANT CHANGE UP (ref 13–17)
IMM GRANULOCYTES NFR BLD AUTO: 1 % — SIGNIFICANT CHANGE UP (ref 0–1.5)
LYMPHOCYTES # BLD AUTO: 0.59 K/UL — LOW (ref 1–3.3)
LYMPHOCYTES # BLD AUTO: 8 % — LOW (ref 13–44)
MAGNESIUM SERPL-MCNC: 2.4 MG/DL — SIGNIFICANT CHANGE UP (ref 1.6–2.6)
MCHC RBC-ENTMCNC: 31 PG — SIGNIFICANT CHANGE UP (ref 27–34)
MCHC RBC-ENTMCNC: 32.4 GM/DL — SIGNIFICANT CHANGE UP (ref 32–36)
MCV RBC AUTO: 95.5 FL — SIGNIFICANT CHANGE UP (ref 80–100)
MONOCYTES # BLD AUTO: 0.32 K/UL — SIGNIFICANT CHANGE UP (ref 0–0.9)
MONOCYTES NFR BLD AUTO: 4.3 % — SIGNIFICANT CHANGE UP (ref 2–14)
NEUTROPHILS # BLD AUTO: 6.33 K/UL — SIGNIFICANT CHANGE UP (ref 1.8–7.4)
NEUTROPHILS NFR BLD AUTO: 86.1 % — HIGH (ref 43–77)
NRBC # BLD: 0 /100 WBCS — SIGNIFICANT CHANGE UP (ref 0–0)
PHOSPHATE SERPL-MCNC: 3 MG/DL — SIGNIFICANT CHANGE UP (ref 2.5–4.5)
PLATELET # BLD AUTO: 207 K/UL — SIGNIFICANT CHANGE UP (ref 150–400)
POTASSIUM SERPL-MCNC: 4.3 MMOL/L — SIGNIFICANT CHANGE UP (ref 3.5–5.3)
POTASSIUM SERPL-SCNC: 4.3 MMOL/L — SIGNIFICANT CHANGE UP (ref 3.5–5.3)
PROCALCITONIN SERPL-MCNC: 0.04 NG/ML — SIGNIFICANT CHANGE UP (ref 0.02–0.1)
PROT SERPL-MCNC: 5.3 G/DL — LOW (ref 6–8.3)
RBC # BLD: 4.71 M/UL — SIGNIFICANT CHANGE UP (ref 4.2–5.8)
RBC # FLD: 14.6 % — HIGH (ref 10.3–14.5)
SODIUM SERPL-SCNC: 148 MMOL/L — HIGH (ref 135–145)
WBC # BLD: 7.36 K/UL — SIGNIFICANT CHANGE UP (ref 3.8–10.5)
WBC # FLD AUTO: 7.36 K/UL — SIGNIFICANT CHANGE UP (ref 3.8–10.5)

## 2020-05-04 PROCEDURE — 71250 CT THORAX DX C-: CPT | Mod: 26,CS

## 2020-05-04 PROCEDURE — 99291 CRITICAL CARE FIRST HOUR: CPT | Mod: CS

## 2020-05-04 PROCEDURE — 99232 SBSQ HOSP IP/OBS MODERATE 35: CPT | Mod: CS

## 2020-05-04 RX ORDER — ASPIRIN/CALCIUM CARB/MAGNESIUM 324 MG
81 TABLET ORAL DAILY
Refills: 0 | Status: DISCONTINUED | OUTPATIENT
Start: 2020-05-04 | End: 2020-05-11

## 2020-05-04 RX ADMIN — INSULIN GLARGINE 18 UNIT(S): 100 INJECTION, SOLUTION SUBCUTANEOUS at 22:31

## 2020-05-04 RX ADMIN — ATORVASTATIN CALCIUM 20 MILLIGRAM(S): 80 TABLET, FILM COATED ORAL at 22:30

## 2020-05-04 RX ADMIN — DULOXETINE HYDROCHLORIDE 20 MILLIGRAM(S): 30 CAPSULE, DELAYED RELEASE ORAL at 18:11

## 2020-05-04 RX ADMIN — Medication 4: at 11:46

## 2020-05-04 RX ADMIN — Medication 2: at 22:36

## 2020-05-04 RX ADMIN — PIPERACILLIN AND TAZOBACTAM 200 GRAM(S): 4; .5 INJECTION, POWDER, LYOPHILIZED, FOR SOLUTION INTRAVENOUS at 18:11

## 2020-05-04 RX ADMIN — Medication 2: at 16:30

## 2020-05-04 RX ADMIN — Medication 1000 UNIT(S): at 18:12

## 2020-05-04 RX ADMIN — Medication 1 TABLET(S): at 11:37

## 2020-05-04 RX ADMIN — Medication 81 MILLIGRAM(S): at 11:35

## 2020-05-04 RX ADMIN — ENOXAPARIN SODIUM 40 MILLIGRAM(S): 100 INJECTION SUBCUTANEOUS at 18:10

## 2020-05-04 RX ADMIN — PIPERACILLIN AND TAZOBACTAM 200 GRAM(S): 4; .5 INJECTION, POWDER, LYOPHILIZED, FOR SOLUTION INTRAVENOUS at 11:32

## 2020-05-04 RX ADMIN — POLYETHYLENE GLYCOL 3350 17 GRAM(S): 17 POWDER, FOR SOLUTION ORAL at 11:37

## 2020-05-04 RX ADMIN — DULOXETINE HYDROCHLORIDE 20 MILLIGRAM(S): 30 CAPSULE, DELAYED RELEASE ORAL at 05:57

## 2020-05-04 RX ADMIN — Medication 10 MILLIGRAM(S): at 11:36

## 2020-05-04 RX ADMIN — Medication 40 MILLIGRAM(S): at 05:57

## 2020-05-04 RX ADMIN — Medication 100 MICROGRAM(S): at 22:31

## 2020-05-04 RX ADMIN — PANTOPRAZOLE SODIUM 40 MILLIGRAM(S): 20 TABLET, DELAYED RELEASE ORAL at 06:00

## 2020-05-04 RX ADMIN — PIPERACILLIN AND TAZOBACTAM 200 GRAM(S): 4; .5 INJECTION, POWDER, LYOPHILIZED, FOR SOLUTION INTRAVENOUS at 05:58

## 2020-05-04 RX ADMIN — OLANZAPINE 5 MILLIGRAM(S): 15 TABLET, FILM COATED ORAL at 22:31

## 2020-05-04 RX ADMIN — Medication 300 MILLIGRAM(S): at 11:30

## 2020-05-04 RX ADMIN — SENNA PLUS 2 TABLET(S): 8.6 TABLET ORAL at 22:32

## 2020-05-04 NOTE — PROGRESS NOTE ADULT - ASSESSMENT
75M PMHx male with DM presented for 1 week of fevers. Patient's wife reported fall 1 week ago hitting head and noted increased fatigue/sleepiness. [facility] for management of hyponatremia and AHRF 2/2 COVID-19, stepped up to ICU for closer monitoring.    ATN largely resolved Cr stable around 1.2/1.3 may be baseline. Hypernatremia improving as well with free water  Will sign off, please feel free to reconsult should further questions arise

## 2020-05-04 NOTE — PROGRESS NOTE ADULT - ASSESSMENT
75M PMHx of DM presented for 1 week of fevers. Patient's wife reported fall 1 week ago hitting head and noted increased fatigue/sleepiness. Found to be COVID+, with [facility] Admitted to [facility] for management of hyponatremia and AHRF 2/2 COVID-19, stepped up to ICU for closer monitoring.     NEUROLOGY: LAURENCE   AAOx3, conversive.  -Patient attempting to get out of bed at nighttime, stating he is going home, no known history of dementia.  - frequent re-orientation     #Depression  -home cymbalta increased to 20mg BID per psych recommendation   - no suicidal ideation      #Neuropathy  Hx of neuropathy on duloxetine and gabapentin  - home cymbalta increased to 20mg BID per psych recommendation   - hold off on gabapentin     #Delirium  Patient noted to be sundowning and often pulling off mask at nighttime.   - c/w zyprexa, no haldol (prolonged qtc) or ativan   - monitor QTc    CARDIOVASCULAR  #Hypertension  Patient with history of HTN on home losartan and atenolol  - holding losartan in setting of liz and COVID  - holding home norvasc 5 mg    RESPIRATORY   #Acute Hypoxic Respiratory Failure 2/2 COVID-19   Pt with increasing O2 requirements, desaturating on HFNC, requiring BiPAP  - CXR 4/25 unchanged from prior day  - Continue to trend inflammatory markers  - s/p hydroxychloroquine and azithromycin  - Placed on cefpodoxime (4/21) to cover CAP, switched to zosyn (completed on 4/27)  - s/p Toci 4/24  - awake proning/laying on right side   - NRB -> CPAP 6 at bedtime -> trial on RA (6L)   - steroids 40mg PO qd     INFECTIOUS DISEASE  #COVID-19 Pneumonia   COVID-19 + 4/21.  -f/u procalcitonin to eval for bacterial infection  -f/u LDH, CRP, ferritin, d-dimer, haptoglobin, CK, G6PD, ESR/CRP, fibrinogen, QuantiFeron  -contact/airborne precaution  -tylenol PRN for fever  -Robitussin PRN for cough  -avoid NSAIDs  -no nebulizers, MDI only  - awake proning  - lay on right side     RENAL/METABOLIC   #LIZ   Noted Cr at 1.91, unknown baseline but noted improvement to 1.64 with 3L IVF. Possible COVID component to kidney injury as well.   - urine lytes -> prerenal  - creatinine improving  - Hold home losartan in setting of LIZ  - avoid nephrotoxic agents    #Hypernatremia   Na 153 ->157 -> 149 -> 147   - encourage water intake   - c/w frequent finger stick monitoring (q4hr fingersticks)   - trend Na    GI:  - Protonix 40mg for prophylaxis  - no bm's, started bowel regimen    #Dysphagia  Failed bedside dysphagia.  - speech and swallow w/ recommendation of puree/nectar thick   - hold PM feed for BiPAP     ENDOCRINE:  #Hypothyroidism  Hx of hypothyroidism on synthroid  -c/w home synthroid dose  -tsh normal    #Type 2 Diabetes   Hx of DM on basal 28U and premeal 6U, also on statin, ARB  - Mod ISS  - c/w Lantus 18 qHS  - A1C 6.6   - finger sticks q4 hr     Heme  #Elevated d-dimer  -rising d-dimer most likely 2/2 covid  -LE doppler negative for DVT    PROPHYLACTIC MEASURE:  F: tolerating PO, no IVF  E: replete K<4, Mg<2  N: puree-nectar thick; hold dinner in setting of CPAP     VTE Prophylaxis: Lovenox SubQ  C: Full Code  D: COVID-MICU 75M PMHx of DM presented for 1 week of fevers. Patient's wife reported fall 1 week ago hitting head and noted increased fatigue/sleepiness. [facility] Ad[facility] to [facility] for management of hyponatremia and AHRF 2/2 COVID-19, stepped up to ICU for closer monitoring.     NEUROLOGY: LAURENCE   AAOx3, conversive.  -Patient attempting to get out of bed at nighttime, stating he is going home, no known history of dementia.  - frequent re-orientation     #Depression  -home cymbalta increased to 20mg BID per psych recommendation  - no suicidal ideation      #Neuropathy  Hx of neuropathy on duloxetine and gabapentin  - home cymbalta increased to 20mg BID per psych recommendation   - hold off on gabapentin     #Delirium  Patient noted to be sundowning and often pulling off mask at nighttime.   - c/w zyprexa, no haldol (prolonged qtc) or ativan   - monitor QTc    CARDIOVASCULAR  #Hypertension  Patient with history of HTN on home losartan and atenolol  - holding losartan in setting of liz and COVID  - holding home norvasc 5 mg    RESPIRATORY   #Acute Hypoxic Respiratory Failure 2/2 COVID-19   Pt with increasing O2 requirements, desaturating on HFNC, requiring BiPAP  - CXR 4/25 unchanged from prior day  - Continue to trend inflammatory markers  - s/p hydroxychloroquine and azithromycin  - Placed on cefpodoxime (4/21) to cover CAP, switched to zosyn (completed on 4/27)  - s/p Toci 4/24  - awake proning/laying on right side   - NRB -> CPAP 6 at bedtime -> trial on RA (6L)   - steroids 40mg PO qd (will determine taper)  - CT chest (5/4) demonstrating  Interval resolution of groundglass and linear opacities noted predominantly in the right upper lobe with new more extensive ground glass, nodular solid and linear opacities opacities with a bronchovascular and peripheral distribution located within the upper mid and lower lung zones.      INFECTIOUS DISEASE  #COVID-19 Pneumonia   COVID-19 + 4/21.  -f/u procalcitonin to eval for bacterial infection  -f/u LDH, CRP, ferritin, d-dimer, haptoglobin, CK, G6PD, ESR/CRP, fibrinogen, QuantiFeron  -contact/airborne precaution  -tylenol PRN for fever  -Robitussin PRN for cough  -avoid NSAIDs  -no nebulizers, MDI only  - awake proning  - lay on right side     RENAL/METABOLIC   #LIZ   Noted Cr at 1.91, unknown baseline but noted improvement to 1.64 with 3L IVF. Possible COVID component to kidney injury as well.   - urine lytes -> prerenal  - creatinine improving  - Hold home losartan in setting of LIZ  - avoid nephrotoxic agents    #Hypernatremia   Na 153 ->157 -> 149 -> 147 -> 148   - encourage water intake   - c/w frequent finger stick monitoring (q4hr fingersticks)   - trend Na    GI:  - Protonix 40mg for prophylaxis  - no bm's, started bowel regimen    #Dysphagia  Failed bedside dysphagia.  - speech and swallow w/ recommendation of puree/nectar thick; reassessment w/ recommendation of thin liquids, witnessed to be coughing  - speech and swallow to reassess  - continue with dysphagia puree-nectar thick diet   - hold PM feed for BiPAP     ENDOCRINE:  #Hypothyroidism  Hx of hypothyroidism on synthroid  -c/w home synthroid dose  -tsh normal    #Type 2 Diabetes   Hx of DM on basal 28U and premeal 6U, also on statin, ARB  - Mod ISS  - c/w Lantus 18 qHS  - A1C 6.6   - finger sticks q4 hr     Heme  #Elevated d-dimer  -rising d-dimer most likely 2/2 covid  -LE doppler negative for DVT    PROPHYLACTIC MEASURE:  F: tolerating PO, no IVF  E: replete K<4, Mg<2  N: puree-nectar thick; hold dinner in setting of CPAP     VTE Prophylaxis: Lovenox SubQ  C: Full Code  D: COVID-MICU

## 2020-05-04 NOTE — PROGRESS NOTE ADULT - SUBJECTIVE AND OBJECTIVE BOX
CC: AMS      INTERVAL HISTORY:   chart and labs reviewed      ROS: No chest pain, no sob, no abd pain. No n/v/d    PAST MEDICAL & SURGICAL HISTORY:  Type 2 diabetes mellitus      PHYSICAL EXAM:  T(C): 36.2   HR: 65   BP: 116/66   RR: 15  SpO2: 93%  Weight   General: AAO x 3,  NAD.  HEENT: moist mucous membranes, no pallor/cyanosis.  Neck: no JVD visible.  Cardiac: S1, S2. RRR. No murmurs   Respratory: CTA b/l, no access muscle use.   Abdomen: soft. nontender. nondistended  Skin: no rashes.  Extremities: no LE edema b/l  Access:       DATA: reviewed                        14.7   7.79  )-----------( 229      ( 03 May 2020 05:35 )             45.9     Ferritin, Serum: 963 ng/mL <H> (05-03 @ 05:35)      147<H>  |  113<H>  |  41<H>  ----------------------------<  222<H>  Ca:9.0   (03 May 2020 05:35)  5.2     |  24     |  1.28       eGFR if Non : 54 <L>  eGFR if : 63    TPro  5.7<L>  /  Alb  2.9<L>  /  TBili  0.5    /  DBili  x      /  AST  40     /  ALT  51<H>  /  AlkPhos  49     03 May 2020 05:35                    MEDICATIONS  (STANDING):  allopurinol 300 milliGRAM(s) Oral daily  atorvastatin 20 milliGRAM(s) Oral at bedtime  bisacodyl Suppository 10 milliGRAM(s) Rectal daily  cholecalciferol 1000 Unit(s) Oral every 24 hours  dextrose 5%. 1000 milliLiter(s) (50 mL/Hr) IV Continuous <Continuous>  dextrose 50% Injectable 12.5 Gram(s) IV Push once  dextrose 50% Injectable 25 Gram(s) IV Push once  dextrose 50% Injectable 25 Gram(s) IV Push once  DULoxetine 20 milliGRAM(s) Oral two times a day  enoxaparin Injectable 40 milliGRAM(s) SubCutaneous every 24 hours  insulin glargine Injectable (LANTUS) 18 Unit(s) SubCutaneous at bedtime  insulin lispro (HumaLOG) corrective regimen sliding scale   SubCutaneous Before meals and at bedtime  levothyroxine Injectable 100 MICROGram(s) IV Push at bedtime  OLANZapine 5 milliGRAM(s) Oral at bedtime  pantoprazole    Tablet 40 milliGRAM(s) Oral before breakfast  piperacillin/tazobactam IVPB.. 4.5 Gram(s) IV Intermittent every 6 hours  polyethylene glycol 3350 17 Gram(s) Oral daily  predniSONE   Tablet 40 milliGRAM(s) Oral daily  senna 2 Tablet(s) Oral at bedtime  trimethoprim  160 mG/sulfamethoxazole 800 mG 1 Tablet(s) Oral <User Schedule>    MEDICATIONS  (PRN):  acetaminophen   Tablet .. 650 milliGRAM(s) Oral every 6 hours PRN Temp greater or equal to 38C (100.4F)  dextrose 40% Gel 15 Gram(s) Oral once PRN Blood Glucose LESS THAN 70 milliGRAM(s)/deciliter  glucagon  Injectable 1 milliGRAM(s) IntraMuscular once PRN Glucose LESS THAN 70 milligrams/deciliter

## 2020-05-04 NOTE — PROGRESS NOTE BEHAVIORAL HEALTH - NSBHFUPINTERVALHXFT_PSY_A_CORE
Pt seems to be gradually improving from his lymphopenia with a reduction of D-Dimer, better control of FS glucose and kidney function, correction of acid-base disturbances; stable MAPs and SpO2. However, protein and albumin are overall decreasing, which suggests pt might be developing nutritional deficiencies. Also, Na+ levels are still up-trending. These are likely contributing to delirium. CXR with progression of infiltrates, as expected. No repeat EKG available to monitor QTc.

## 2020-05-04 NOTE — PROGRESS NOTE BEHAVIORAL HEALTH - PROBLEM SELECTOR PLAN 1
-Address underlying predisposing factors for delirium, namely c/w treatment for COVID-19, improving free water to tackle hypernatremia, addressing neuropathic pain, tackling nutritional deficiencies    -Consider nutritional assessment to receive additional dietary interventions to reduce inflammation while improving overall absorption of micro/macro elements during critical illness (e.g. glutamine supplementation)  -C/w Cymbalta 20mg BID  -Consider Gabapentin 100mg TID, which may be given 100mg q6h PRN for agitation. Avoid antipsychotics as much as possible due to concerns with QTc. Lurasidone and Abilify have the lowest impact on QTc, and low-dose Seroquel (12.5mg with food) can have sedating effect  -If aggressive analgesia or sedation is advised, may consider Dexmedetomidine, which is a/w lower risk for delirium during weaning and may be tightly controlled within an ICU setting  -C/w orientation measures, including turning lights on during the day and off during night time, having staff talk to him through the day, bringing photographs from family members, having him communicate with family by phone if possible  -C/w Melatonin 10mg qhs orally  -Avoid antipsychotics at this point. If ultimately needed, Lurasidone or Aripiprazole have a lower risk for QTc prolongation, and Seroquel 12.5mg with food may be considered, ultima ratio, for soft sedation  -Call psych for f/u re-consult if new questions arise -Address underlying predisposing factors for delirium, namely c/w treatment for COVID-19, improving free water to tackle hypernatremia, addressing neuropathic pain, tackling nutritional deficiencies    -Consider nutritional assessment to receive additional dietary interventions to reduce inflammation while improving overall absorption of micro/macro elements during critical illness (e.g. glutamine supplementation)  -C/w Cymbalta 20mg BID  -Consider Gabapentin 100mg TID, which may be given 100mg q6h PRN for agitation. Avoid antipsychotics as much as possible due to concerns with QTc. Lurasidone and Abilify have the lowest impact on QTc, and low-dose Seroquel (12.5mg with food) can have sedating effect  -If aggressive analgesia or sedation is advised, may consider Dexmedetomidine, which is a/w lower risk for delirium during weaning and may be tightly controlled within an ICU setting  -C/w orientation measures, including turning lights on during the day and off during night time, having staff talk to him through the day, bringing photographs from family members, having him communicate with family by phone if possible  -C/w Melatonin 10mg qhs orally  -Call psych for f/u re-consult if new questions arise

## 2020-05-04 NOTE — PROGRESS NOTE BEHAVIORAL HEALTH - SUMMARY
75M PMHx of DM2, who came to [facility] due to 1-week long fever s/p fall with increased fatigue/sleepiness. Found to be COVID+ with hyponatremia at [facility]. Admitted to [facility] for management of hyponatremia and AHRF 2/2 COVID-19, stepped up to ICU for closer monitoring.    According to the primary team, the patient is still sundowning and trying to get out of his bed and remove devices (HFNC), which suggests pt is still delirious. There is promising lab data suggesting overall immune improvement during his COVID treatment. His delirium is multifactorial in nature, and there is room for additional improvement. For instance:  -Nutritional support to modulate inflammation (Glutamine, tighter carbohydrate ingestion, enhanced protein intake with adjusted macro/micro elements)  -Addressing hypernatremia  -Waiting 2-3 weeks for improvement of neuropathic pain  -Enhancing adherence to HFNC    Given his long QTc and risk for TdP and negative cardiovascular outcomes, we would still not recommend the use of antipsychotics at this point. Please see recommendations below

## 2020-05-04 NOTE — PROGRESS NOTE BEHAVIORAL HEALTH - NSBHFUPINTERVALCCFT_PSY_A_CORE
Team called asking for additional recommendations in the management of delirium. Discussed impressions and recommendations with Jose.

## 2020-05-04 NOTE — CHART NOTE - NSCHARTNOTEFT_GEN_A_CORE
Curbside advice given to the team (Dr. Garcia)    Interval Changes: Pt seems to be gradually improving from his lymphopenia with a reduction of D-Dimer, better control of FS glucose and kidney function, correction of acid-base disturbances; stable MAPs and SpO2. However, protein and albumin are overall decreasing, which suggests pt might be developing nutritional deficiencies. Also, Na+ levels are still up-trending. These are likely contributing to delirium. CXR with progression of infiltrates, as expected. No repeat EKG available to monitor QTc.    ASSESSMENT OF CURRENT CONDITION:   75M PMHx of DM2, who came to [facility] due to 1-week long fever s/p fall with increased fatigue/sleepiness. Found to be COVID+ with hyponatremia at [facility]. Admitted to [facility] for management of hyponatremia and AHRF 2/2 COVID-19, stepped up to ICU for closer monitoring.    According to the primary team, the patient is still sundowning and trying to get out of his bed and remove devices (HFNC), which suggests pt is still delirious. There is promising lab data suggesting overall immune improvement during his COVID treatment. His delirium is multifactorial in nature, and there is room for additional improvement. For instance:  -Nutritional support to modulate inflammation (Glutamine, tighter carbohydrate ingestion, enhanced protein intake with adjusted macro/micro elements)  -Addressing hypernatremia  -Waiting 2-3 weeks for improvement of neuropathic pain  -Enhancing adherence to HFNC    Given his long QTc and risk for TdP and negative cardiovascular outcomes, we would still not recommend the use of antipsychotics at this point. Please see recommendations below.    Recommendations for Delirium Management:  -Address underlying predisposing factors for delirium, namely c/w treatment for COVID-19, improving free water to tackle hypernatremia, addressing neuropathic pain, tackling nutritional deficiencies  -Consider nutritional assessment to receive additional dietary interventions to reduce inflammation while improving overall absorption of micro/macro elements during critical illness (e.g. glutamine supplementation)  -C/w Cymbalta 20mg BID  -Consider Gabapentin 100mg TID, which may be given 100mg q6h PRN for agitation. Avoid antipsychotics as much as possible due to concerns with QTc. Lurasidone and Abilify have the lowest impact on QTc, and low-dose Seroquel (12.5mg with food) can have sedating effect  -If aggressive analgesia or sedation is advised, may consider Dexmedetomidine, which is a/w lower risk for delirium during weaning and may be tightly controlled within an ICU setting  -C/w orientation measures, including turning lights on during the day and off during night time, having staff talk to him through the day, bringing photographs from family members, having him communicate with family by phone if possible  -C/w Melatonin 10mg qhs orally  -Call psych for f/u re-consult if new questions arise.

## 2020-05-04 NOTE — PROGRESS NOTE ADULT - ATTENDING COMMENTS
75 year old male with h/o DM, never smoker with acute hypoxic respiratory failure with COVID pneumonitis with hypernatremia (improving). Respiratory failure improved. Currently on 6 l/min oxygen.   Plan:.  - PO prednisone (improvement in hypoxia with use of 500 mg daily X 3 days of solumedrol and hence plan for PO prednisone). Plan for slow taper and repeat CT chest in 2 weeks again.  - Pulmonary consult called today for follow up.  - Continue Lantus for DM.  - Patient has delirium during night time. QT was prolonged. d/c Seroquel. Avoid haldol. Zyprexa to continue.  - Duloxetine 20 mg bid for depression.  - OOB to chair.  - PCP prophylaxis  - D/c zosyn after 7 days .

## 2020-05-05 DIAGNOSIS — U07.1 COVID-19: ICD-10-CM

## 2020-05-05 DIAGNOSIS — J96.01 ACUTE RESPIRATORY FAILURE WITH HYPOXIA: ICD-10-CM

## 2020-05-05 LAB
ALBUMIN SERPL ELPH-MCNC: 2.9 G/DL — LOW (ref 3.3–5)
ALP SERPL-CCNC: 48 U/L — SIGNIFICANT CHANGE UP (ref 40–120)
ALT FLD-CCNC: 37 U/L — SIGNIFICANT CHANGE UP (ref 10–45)
ANION GAP SERPL CALC-SCNC: 8 MMOL/L — SIGNIFICANT CHANGE UP (ref 5–17)
AST SERPL-CCNC: 24 U/L — SIGNIFICANT CHANGE UP (ref 10–40)
BASOPHILS # BLD AUTO: 0.01 K/UL — SIGNIFICANT CHANGE UP (ref 0–0.2)
BASOPHILS NFR BLD AUTO: 0.2 % — SIGNIFICANT CHANGE UP (ref 0–2)
BILIRUB SERPL-MCNC: 0.6 MG/DL — SIGNIFICANT CHANGE UP (ref 0.2–1.2)
BUN SERPL-MCNC: 35 MG/DL — HIGH (ref 7–23)
CALCIUM SERPL-MCNC: 8.8 MG/DL — SIGNIFICANT CHANGE UP (ref 8.4–10.5)
CHLORIDE SERPL-SCNC: 111 MMOL/L — HIGH (ref 96–108)
CO2 SERPL-SCNC: 26 MMOL/L — SIGNIFICANT CHANGE UP (ref 22–31)
CREAT SERPL-MCNC: 1.27 MG/DL — SIGNIFICANT CHANGE UP (ref 0.5–1.3)
CRP SERPL-MCNC: 0.19 MG/DL — SIGNIFICANT CHANGE UP (ref 0–0.4)
D DIMER BLD IA.RAPID-MCNC: 397 NG/ML DDU — HIGH
EOSINOPHIL # BLD AUTO: 0.11 K/UL — SIGNIFICANT CHANGE UP (ref 0–0.5)
EOSINOPHIL NFR BLD AUTO: 1.7 % — SIGNIFICANT CHANGE UP (ref 0–6)
FERRITIN SERPL-MCNC: 1079 NG/ML — HIGH (ref 30–400)
GLUCOSE SERPL-MCNC: 100 MG/DL — HIGH (ref 70–99)
HCT VFR BLD CALC: 44.7 % — SIGNIFICANT CHANGE UP (ref 39–50)
HGB BLD-MCNC: 14.3 G/DL — SIGNIFICANT CHANGE UP (ref 13–17)
IMM GRANULOCYTES NFR BLD AUTO: 0.6 % — SIGNIFICANT CHANGE UP (ref 0–1.5)
LYMPHOCYTES # BLD AUTO: 0.62 K/UL — LOW (ref 1–3.3)
LYMPHOCYTES # BLD AUTO: 9.5 % — LOW (ref 13–44)
MAGNESIUM SERPL-MCNC: 2.1 MG/DL — SIGNIFICANT CHANGE UP (ref 1.6–2.6)
MCHC RBC-ENTMCNC: 31 PG — SIGNIFICANT CHANGE UP (ref 27–34)
MCHC RBC-ENTMCNC: 32 GM/DL — SIGNIFICANT CHANGE UP (ref 32–36)
MCV RBC AUTO: 97 FL — SIGNIFICANT CHANGE UP (ref 80–100)
MONOCYTES # BLD AUTO: 0.26 K/UL — SIGNIFICANT CHANGE UP (ref 0–0.9)
MONOCYTES NFR BLD AUTO: 4 % — SIGNIFICANT CHANGE UP (ref 2–14)
NEUTROPHILS # BLD AUTO: 5.52 K/UL — SIGNIFICANT CHANGE UP (ref 1.8–7.4)
NEUTROPHILS NFR BLD AUTO: 84 % — HIGH (ref 43–77)
NRBC # BLD: 0 /100 WBCS — SIGNIFICANT CHANGE UP (ref 0–0)
PHOSPHATE SERPL-MCNC: 3 MG/DL — SIGNIFICANT CHANGE UP (ref 2.5–4.5)
PLATELET # BLD AUTO: 187 K/UL — SIGNIFICANT CHANGE UP (ref 150–400)
POTASSIUM SERPL-MCNC: 4.4 MMOL/L — SIGNIFICANT CHANGE UP (ref 3.5–5.3)
POTASSIUM SERPL-SCNC: 4.4 MMOL/L — SIGNIFICANT CHANGE UP (ref 3.5–5.3)
PROCALCITONIN SERPL-MCNC: 0.04 NG/ML — SIGNIFICANT CHANGE UP (ref 0.02–0.1)
PROT SERPL-MCNC: 5 G/DL — LOW (ref 6–8.3)
RBC # BLD: 4.61 M/UL — SIGNIFICANT CHANGE UP (ref 4.2–5.8)
RBC # FLD: 14.5 % — SIGNIFICANT CHANGE UP (ref 10.3–14.5)
SODIUM SERPL-SCNC: 145 MMOL/L — SIGNIFICANT CHANGE UP (ref 135–145)
WBC # BLD: 6.56 K/UL — SIGNIFICANT CHANGE UP (ref 3.8–10.5)
WBC # FLD AUTO: 6.56 K/UL — SIGNIFICANT CHANGE UP (ref 3.8–10.5)

## 2020-05-05 PROCEDURE — 99233 SBSQ HOSP IP/OBS HIGH 50: CPT | Mod: CS,GC

## 2020-05-05 PROCEDURE — 99233 SBSQ HOSP IP/OBS HIGH 50: CPT | Mod: CS

## 2020-05-05 RX ORDER — LEVOTHYROXINE SODIUM 125 MCG
137 TABLET ORAL EVERY 24 HOURS
Refills: 0 | Status: DISCONTINUED | OUTPATIENT
Start: 2020-05-05 | End: 2020-05-11

## 2020-05-05 RX ORDER — FAMOTIDINE 10 MG/ML
20 INJECTION INTRAVENOUS DAILY
Refills: 0 | Status: DISCONTINUED | OUTPATIENT
Start: 2020-05-05 | End: 2020-05-11

## 2020-05-05 RX ADMIN — Medication 10 MILLIGRAM(S): at 11:58

## 2020-05-05 RX ADMIN — ENOXAPARIN SODIUM 40 MILLIGRAM(S): 100 INJECTION SUBCUTANEOUS at 16:27

## 2020-05-05 RX ADMIN — Medication 81 MILLIGRAM(S): at 11:58

## 2020-05-05 RX ADMIN — Medication 300 MILLIGRAM(S): at 11:58

## 2020-05-05 RX ADMIN — ATORVASTATIN CALCIUM 20 MILLIGRAM(S): 80 TABLET, FILM COATED ORAL at 21:32

## 2020-05-05 RX ADMIN — INSULIN GLARGINE 18 UNIT(S): 100 INJECTION, SOLUTION SUBCUTANEOUS at 21:32

## 2020-05-05 RX ADMIN — PIPERACILLIN AND TAZOBACTAM 200 GRAM(S): 4; .5 INJECTION, POWDER, LYOPHILIZED, FOR SOLUTION INTRAVENOUS at 06:09

## 2020-05-05 RX ADMIN — Medication 40 MILLIGRAM(S): at 06:09

## 2020-05-05 RX ADMIN — POLYETHYLENE GLYCOL 3350 17 GRAM(S): 17 POWDER, FOR SOLUTION ORAL at 11:59

## 2020-05-05 RX ADMIN — Medication 1000 UNIT(S): at 16:28

## 2020-05-05 RX ADMIN — PANTOPRAZOLE SODIUM 40 MILLIGRAM(S): 20 TABLET, DELAYED RELEASE ORAL at 06:09

## 2020-05-05 RX ADMIN — Medication 137 MICROGRAM(S): at 11:28

## 2020-05-05 RX ADMIN — DULOXETINE HYDROCHLORIDE 20 MILLIGRAM(S): 30 CAPSULE, DELAYED RELEASE ORAL at 06:09

## 2020-05-05 RX ADMIN — Medication 2: at 21:32

## 2020-05-05 RX ADMIN — PIPERACILLIN AND TAZOBACTAM 200 GRAM(S): 4; .5 INJECTION, POWDER, LYOPHILIZED, FOR SOLUTION INTRAVENOUS at 00:12

## 2020-05-05 RX ADMIN — DULOXETINE HYDROCHLORIDE 20 MILLIGRAM(S): 30 CAPSULE, DELAYED RELEASE ORAL at 16:28

## 2020-05-05 RX ADMIN — SENNA PLUS 2 TABLET(S): 8.6 TABLET ORAL at 21:32

## 2020-05-05 RX ADMIN — Medication 2: at 16:28

## 2020-05-05 RX ADMIN — OLANZAPINE 5 MILLIGRAM(S): 15 TABLET, FILM COATED ORAL at 21:32

## 2020-05-05 NOTE — CONSULT NOTE ADULT - ASSESSMENT
This is a 74 y/o man with a history of diabetes who presented with acute hypoxemic respiratory failure from COVID. He was treated for CAP and completed a course of ABX. Currently he is s/p pulse dose steroids and is being maintained on 40mg prednisone.

## 2020-05-05 NOTE — PROGRESS NOTE ADULT - ASSESSMENT
75M PMH DM2 hypoxic respiratory failure 2/2 covid PNA on bipap. ccb hyponatremia, and hypernatremia    Neuro: Tylenol, Cymbalta, Melatonin Zyprexa  CV: Norvasc, Statin, asa  Pulm: COVID, NRB awake prone, CPAP @6 qhs, wean to 4L NC  GI/FEN: DM puree nectar (breakfast and lunch only), Protonix hypernatremia:   : Voids  ID: HAP: zosyn (4/22-4/27, 4/28-5/4) PCP px: Bactrim TIW (4/29--) ///dc:  Cefdopoxime/Zosyn (4/22-4/27), Plaquenil (4/21-4/25), Azithro (4/2104/25), Toci 4/23  Endo: ISS, Lantus 18u, Allopurinol, Synthroid, solumedrol 125q6h x 3d (4/28--4/30)   PPx: SQL, Doppler 4/27 neg  Lines: PIV, R Helen (4/28-)   Wounds: None  PT/OT: Not ordered  Dispo: STEP DOWN TODAY, Full Code 75M PMH DM2 hypoxic respiratory failure 2/2 covid PNA on bipap. ccb hyponatremia, and hypernatremia    Neuro: Tylenol, Cymbalta, Melatonin Zyprexa; had some sundowning recently improved with the zyrexa  CV: Norvasc, Statin, asa and he is perfusing well  Pulm: COVID, refusing BIPAP at night;  wean to 4L NC and his breathing is comfortable. He is continuing on prednisone 40 mg daily which is empiric and on which his pulmonary function has improved; plan is for slow taper.  GI/FEN: DM puree nectar (breakfast and lunch only), Protonix hypernatremia is resolving:   : Voids  ID: HAP: zosyn (4/22-4/27, 4/28-5/4) PCP px: Bactrim TIW for PCP prophylaxis while on steroids (4/29--) ///dc:  Cefdopoxime/Zosyn (4/22-4/27), Plaquenil (4/21-4/25), Azithro (4/2104/25), Toci 4/23  Endo: ISS, Lantus 18u, Allopurinol, Synthroid, solumedrol 125q6h x 3d (4/28--4/30)   PPx: SQL, Doppler 4/27 neg  Lines: PIV, R Helen (4/28-)   Wounds: None  PT/OT: Not ordered  Dispo: STEP DOWN TODAY to telelmetry, Full Code

## 2020-05-05 NOTE — CONSULT NOTE ADULT - PROBLEM SELECTOR RECOMMENDATION 9
-CT scan reviewed which shows b/l ground glass opacities diffusely and areas of consolidation. Interestingly when compared to the prior CT scan, there have been areas of resolution and new areas of GGO. Unclear what the significance of this is clinically but it is atypical.   -Clinically the patient appears to be slowly improving. Tolerating nasal cannula well today.     Recommend:  -Would continue with 40mg prednisone daily (slightly lower than the intended 60mg dose however pt has been improving and is a diabetic and thus would keep the same)  -The patient will need to be weaned from steroids likely over a period of weeks, but will recommend a more defined taper based on the patients clinical trajectory over the next several days.   -OOB to chair, Incentive spirometry, PT.  -Will need repeat imaging w/ CT chest as an outpatient in 6-8 weeks. -CT scan reviewed which shows b/l ground glass opacities diffusely and areas of consolidation. Interestingly when compared to the prior CT scan, there have been areas of resolution and new areas of GGO. Unclear what the significance of this is clinically but it is atypical.   -Clinically the patient appears to be slowly improving. Tolerating nasal cannula well today.     Recommend:  -Would continue with 40mg prednisone daily (slightly lower than the intended 60mg dose however pt has been improving and is a diabetic and thus would keep the same)  -The patient will need to be weaned from steroids likely over a period of weeks, but will recommend a more defined taper based on the patient's clinical trajectory over the next several days.   -OOB to chair, Incentive spirometry, PT.  -Will need repeat imaging w/ CT chest as an outpatient in 6-8 weeks.

## 2020-05-05 NOTE — CONSULT NOTE ADULT - SUBJECTIVE AND OBJECTIVE BOX
PULMONARY SERVICE INITIAL CONSULT NOTE    HPI:  75M PMH of DM presented for 1 week fo fevers. Patient's wife noted increase confusion, fatigue. + Fall- tripped and hit head. Noted progressive worsening over last 3-4 days. Difficulty dressing himself, confused with putting things around house. Poor PO intake, no nausea, no vomiting. + diarrhea (3-4/day) for 3 days    Date of onset of fever: 1 week fevers, Taking temps at home 97-103F.   Date of onset of dyspnea: + SOB (reported work up with cardiologist and pulmonologist in past)  Recent Travel: [place] in February  Sick Contacts: No known sick contacts    At [facility], T 98.7F, HR 55-63, /70- 119/71, RR 18,  96-98% on RA. Labs notable for lymphopenia+, CRP>12, [id number], Na at 122. Patient given 3.7L total of NS, CTX and Azithromycin. CTH neg for acute pathology (21 Apr 2020 16:27)      REVIEW OF SYSTEMS:  A 12 point ROS was reviewed with the patient and was negative except for what is mentioned above.     Allergy and Immunologic: No hives or eczema    PAST MEDICAL & SURGICAL HISTORY:  Type 2 diabetes mellitus      FAMILY HISTORY:      SOCIAL HISTORY:  Smoking Status: [ ] Current, [ ] Former, [ ] Never  Pack Years:    MEDICATIONS:  Pulmonary:    Antimicrobials:  trimethoprim  160 mG/sulfamethoxazole 800 mG 1 Tablet(s) Oral <User Schedule>    Anticoagulants:  aspirin  chewable 81 milliGRAM(s) Oral daily  enoxaparin Injectable 40 milliGRAM(s) SubCutaneous every 24 hours    Onc:    GI/:  bisacodyl Suppository 10 milliGRAM(s) Rectal daily  pantoprazole    Tablet 40 milliGRAM(s) Oral before breakfast  polyethylene glycol 3350 17 Gram(s) Oral daily  senna 2 Tablet(s) Oral at bedtime    Endocrine:  allopurinol 300 milliGRAM(s) Oral daily  atorvastatin 20 milliGRAM(s) Oral at bedtime  dextrose 40% Gel 15 Gram(s) Oral once PRN  dextrose 50% Injectable 12.5 Gram(s) IV Push once  dextrose 50% Injectable 25 Gram(s) IV Push once  dextrose 50% Injectable 25 Gram(s) IV Push once  glucagon  Injectable 1 milliGRAM(s) IntraMuscular once PRN  insulin glargine Injectable (LANTUS) 18 Unit(s) SubCutaneous at bedtime  insulin lispro (HumaLOG) corrective regimen sliding scale   SubCutaneous Before meals and at bedtime  levothyroxine 137 MICROGram(s) Oral every 24 hours  predniSONE   Tablet 40 milliGRAM(s) Oral daily    Cardiac:    Other Medications:  acetaminophen   Tablet .. 650 milliGRAM(s) Oral every 6 hours PRN  cholecalciferol 1000 Unit(s) Oral every 24 hours  dextrose 5%. 1000 milliLiter(s) IV Continuous <Continuous>  DULoxetine 20 milliGRAM(s) Oral two times a day  OLANZapine 5 milliGRAM(s) Oral at bedtime      Allergies    No Known Allergies    Intolerances        Vital Signs Last 24 Hrs  T(C): 36.3 (05 May 2020 04:55), Max: 36.9 (04 May 2020 13:58)  T(F): 97.4 (05 May 2020 04:55), Max: 98.4 (04 May 2020 13:58)  HR: 65 (05 May 2020 07:00) (63 - 93)  BP: 119/73 (05 May 2020 07:00) (116/79 - 160/73)  BP(mean): 88 (05 May 2020 07:00) (83 - 106)  RR: 15 (05 May 2020 07:00) (13 - 45)  SpO2: 94% (05 May 2020 07:00) (88% - 97%)    05-04 @ 07:01  -  05-05 @ 07:00  --------------------------------------------------------  IN: 870 mL / OUT: 1100 mL / NET: -230 mL          PHYSICAL EXAM:  Constitutional: WDWN  Head: NC/AT  EENT: PERRL, anicteric sclera; oropharynx clear, MMM  Neck: supple, no appreciable JVD  Respiratory: CTA B/L; no W/R/R  Cardiovascular: +S1/S2, RRR  Gastrointestinal: soft, NT/ND; +BSx4  Extremities: WWP; no edema, clubbing or cyanosis  Vascular: 2+ radial, DP/PT pulses B/L  Neurological: AAOx3; no focal deficits    LABS:      CBC Full  -  ( 05 May 2020 05:44 )  WBC Count : 6.56 K/uL  RBC Count : 4.61 M/uL  Hemoglobin : 14.3 g/dL  Hematocrit : 44.7 %  Platelet Count - Automated : 187 K/uL  Mean Cell Volume : 97.0 fl  Mean Cell Hemoglobin : 31.0 pg  Mean Cell Hemoglobin Concentration : 32.0 gm/dL  Auto Neutrophil # : 5.52 K/uL  Auto Lymphocyte # : 0.62 K/uL  Auto Monocyte # : 0.26 K/uL  Auto Eosinophil # : 0.11 K/uL  Auto Basophil # : 0.01 K/uL  Auto Neutrophil % : 84.0 %  Auto Lymphocyte % : 9.5 %  Auto Monocyte % : 4.0 %  Auto Eosinophil % : 1.7 %  Auto Basophil % : 0.2 %    05-05    145  |  111<H>  |  35<H>  ----------------------------<  100<H>  4.4   |  26  |  1.27    Ca    8.8      05 May 2020 05:44  Phos  3.0     05-05  Mg     2.1     05-05    TPro  5.0<L>  /  Alb  2.9<L>  /  TBili  0.6  /  DBili  x   /  AST  24  /  ALT  37  /  AlkPhos  48  05-05                      RADIOLOGY & ADDITIONAL STUDIES: PULMONARY SERVICE INITIAL CONSULT NOTE    HPI:  75M PMH of DM presented for 1 week fo fevers. Patient's wife noted increase confusion, fatigue. + Fall- tripped and hit head. During the admission the patient had worsening hypoxemic respiratory failure requiring HFNC an admission to the intensive care unit. The patient was treated with Tocilizumab on 4/23/20, and is s/p azithro + Plaquenil. The patient also received a course of ABX for HAP. The patient had persistent ground glass opacities on CT scan and was difficult to wean from oxgyen and thus he was given pulse dose steroids that was completed on 5/1/20 and is currently being maintained on 40mg daily prednisone. The pulmonary service was consult asked to evaluate the patient given his slowly improving respiratory failure. The patient was examined and noted that overall his breathing is improving daily, he denies fever, cough, our malaise. No other acute complaints.       REVIEW OF SYSTEMS:  A 12 point ROS was reviewed with the patient and was negative except for what is mentioned above.     Allergy and Immunologic: No hives or eczema    PAST MEDICAL & SURGICAL HISTORY:  Type 2 diabetes mellitus      FAMILY HISTORY:      SOCIAL HISTORY:  Smoking Status: [ ] Current, [ ] Former, [ X] Never  Pack Years:    MEDICATIONS:  Pulmonary:    Antimicrobials:  trimethoprim  160 mG/sulfamethoxazole 800 mG 1 Tablet(s) Oral <User Schedule>    Anticoagulants:  aspirin  chewable 81 milliGRAM(s) Oral daily  enoxaparin Injectable 40 milliGRAM(s) SubCutaneous every 24 hours    Onc:    GI/:  bisacodyl Suppository 10 milliGRAM(s) Rectal daily  pantoprazole    Tablet 40 milliGRAM(s) Oral before breakfast  polyethylene glycol 3350 17 Gram(s) Oral daily  senna 2 Tablet(s) Oral at bedtime    Endocrine:  allopurinol 300 milliGRAM(s) Oral daily  atorvastatin 20 milliGRAM(s) Oral at bedtime  dextrose 40% Gel 15 Gram(s) Oral once PRN  dextrose 50% Injectable 12.5 Gram(s) IV Push once  dextrose 50% Injectable 25 Gram(s) IV Push once  dextrose 50% Injectable 25 Gram(s) IV Push once  glucagon  Injectable 1 milliGRAM(s) IntraMuscular once PRN  insulin glargine Injectable (LANTUS) 18 Unit(s) SubCutaneous at bedtime  insulin lispro (HumaLOG) corrective regimen sliding scale   SubCutaneous Before meals and at bedtime  levothyroxine 137 MICROGram(s) Oral every 24 hours  predniSONE   Tablet 40 milliGRAM(s) Oral daily    Cardiac:    Other Medications:  acetaminophen   Tablet .. 650 milliGRAM(s) Oral every 6 hours PRN  cholecalciferol 1000 Unit(s) Oral every 24 hours  dextrose 5%. 1000 milliLiter(s) IV Continuous <Continuous>  DULoxetine 20 milliGRAM(s) Oral two times a day  OLANZapine 5 milliGRAM(s) Oral at bedtime      Allergies    No Known Allergies    Intolerances        Vital Signs Last 24 Hrs  T(C): 36.3 (05 May 2020 04:55), Max: 36.9 (04 May 2020 13:58)  T(F): 97.4 (05 May 2020 04:55), Max: 98.4 (04 May 2020 13:58)  HR: 65 (05 May 2020 07:00) (63 - 93)  BP: 119/73 (05 May 2020 07:00) (116/79 - 160/73)  BP(mean): 88 (05 May 2020 07:00) (83 - 106)  RR: 15 (05 May 2020 07:00) (13 - 45)  SpO2: 94% (05 May 2020 07:00) (88% - 97%)    05-04 @ 07:01  -  05-05 @ 07:00  --------------------------------------------------------  IN: 870 mL / OUT: 1100 mL / NET: -230 mL          PHYSICAL EXAM:  Constitutional: WDWN  Head: NC/AT  EENT: PERRL, anicteric sclera; oropharynx clear, MMM  Neck: supple, no appreciable JVD  Respiratory: Decreased breath sounds b/l bases, no wheezes, no rhonchi.   Cardiovascular: +S1/S2, RRR  Gastrointestinal: soft, NT/ND; +BSx4  Extremities: WWP; no edema, clubbing or cyanosis  Vascular: 2+ radial, DP/PT pulses B/L  Neurological: AAOx3; no focal deficits    LABS:      CBC Full  -  ( 05 May 2020 05:44 )  WBC Count : 6.56 K/uL  RBC Count : 4.61 M/uL  Hemoglobin : 14.3 g/dL  Hematocrit : 44.7 %  Platelet Count - Automated : 187 K/uL  Mean Cell Volume : 97.0 fl  Mean Cell Hemoglobin : 31.0 pg  Mean Cell Hemoglobin Concentration : 32.0 gm/dL  Auto Neutrophil # : 5.52 K/uL  Auto Lymphocyte # : 0.62 K/uL  Auto Monocyte # : 0.26 K/uL  Auto Eosinophil # : 0.11 K/uL  Auto Basophil # : 0.01 K/uL  Auto Neutrophil % : 84.0 %  Auto Lymphocyte % : 9.5 %  Auto Monocyte % : 4.0 %  Auto Eosinophil % : 1.7 %  Auto Basophil % : 0.2 %    05-05    145  |  111<H>  |  35<H>  ----------------------------<  100<H>  4.4   |  26  |  1.27    Ca    8.8      05 May 2020 05:44  Phos  3.0     05-05  Mg     2.1     05-05    TPro  5.0<L>  /  Alb  2.9<L>  /  TBili  0.6  /  DBili  x   /  AST  24  /  ALT  37  /  AlkPhos  48  05-05                      RADIOLOGY & ADDITIONAL STUDIES: PULMONARY SERVICE INITIAL CONSULT NOTE    HPI:  75M PMH of DM presented for 1 week fo fevers. Patient's wife noted increase confusion, fatigue. + Fall- tripped and hit head. During the admission the patient had worsening hypoxemic respiratory failure requiring HFNC an admission to the intensive care unit. The patient was treated with Tocilizumab on 4/23/20, and is s/p azithro + Plaquenil. The patient also received a course of ABX for HAP. The patient had persistent ground glass opacities on CT scan and was difficult to wean from oxgyen and thus he was given pulse dose steroids that was completed on 5/1/20 and is currently being maintained on 40mg daily prednisone. The pulmonary service was consult asked to evaluate the patient given his slowly improving respiratory failure. The patient was examined and noted that overall his breathing is improving daily, he denies fever, cough, our malaise. No other acute complaints.       REVIEW OF SYSTEMS:  A 12 point ROS was reviewed with the patient and was negative except for what is mentioned above.     Allergy and Immunologic: No hives or eczema    PAST MEDICAL & SURGICAL HISTORY:  Type 2 diabetes mellitus      FAMILY HISTORY:      SOCIAL HISTORY:  Smoking Status: [ ] Current, [ ] Former, [ X] Never  Pack Years:    MEDICATIONS:  Pulmonary:    Antimicrobials:  trimethoprim  160 mG/sulfamethoxazole 800 mG 1 Tablet(s) Oral <User Schedule>    Anticoagulants:  aspirin  chewable 81 milliGRAM(s) Oral daily  enoxaparin Injectable 40 milliGRAM(s) SubCutaneous every 24 hours    Onc:    GI/:  bisacodyl Suppository 10 milliGRAM(s) Rectal daily  pantoprazole    Tablet 40 milliGRAM(s) Oral before breakfast  polyethylene glycol 3350 17 Gram(s) Oral daily  senna 2 Tablet(s) Oral at bedtime    Endocrine:  allopurinol 300 milliGRAM(s) Oral daily  atorvastatin 20 milliGRAM(s) Oral at bedtime  dextrose 40% Gel 15 Gram(s) Oral once PRN  dextrose 50% Injectable 12.5 Gram(s) IV Push once  dextrose 50% Injectable 25 Gram(s) IV Push once  dextrose 50% Injectable 25 Gram(s) IV Push once  glucagon  Injectable 1 milliGRAM(s) IntraMuscular once PRN  insulin glargine Injectable (LANTUS) 18 Unit(s) SubCutaneous at bedtime  insulin lispro (HumaLOG) corrective regimen sliding scale   SubCutaneous Before meals and at bedtime  levothyroxine 137 MICROGram(s) Oral every 24 hours  predniSONE   Tablet 40 milliGRAM(s) Oral daily    Cardiac:    Other Medications:  acetaminophen   Tablet .. 650 milliGRAM(s) Oral every 6 hours PRN  cholecalciferol 1000 Unit(s) Oral every 24 hours  dextrose 5%. 1000 milliLiter(s) IV Continuous <Continuous>  DULoxetine 20 milliGRAM(s) Oral two times a day  OLANZapine 5 milliGRAM(s) Oral at bedtime      Allergies    No Known Allergies    Intolerances        Vital Signs Last 24 Hrs  T(C): 36.3 (05 May 2020 04:55), Max: 36.9 (04 May 2020 13:58)  T(F): 97.4 (05 May 2020 04:55), Max: 98.4 (04 May 2020 13:58)  HR: 65 (05 May 2020 07:00) (63 - 93)  BP: 119/73 (05 May 2020 07:00) (116/79 - 160/73)  BP(mean): 88 (05 May 2020 07:00) (83 - 106)  RR: 15 (05 May 2020 07:00) (13 - 45)  SpO2: 94% (05 May 2020 07:00) (88% - 97%)    05-04 @ 07:01  -  05-05 @ 07:00  --------------------------------------------------------  IN: 870 mL / OUT: 1100 mL / NET: -230 mL          PHYSICAL EXAM:  Constitutional: WDWN  Head: NC/AT  EENT: PERRL, anicteric sclera; oropharynx clear, MMM  Neck: supple, no appreciable JVD  Respiratory: Decreased breath sounds b/l bases, no wheezes, no rhonchi.   Cardiovascular: +S1/S2, RRR  Gastrointestinal: soft, NT/ND; +BSx4  Extremities: WWP; no edema, clubbing or cyanosis  Vascular: 2+ radial, DP/PT pulses B/L  Neurological: AAOx3; no focal deficits    LABS:      CBC Full  -  ( 05 May 2020 05:44 )  WBC Count : 6.56 K/uL  RBC Count : 4.61 M/uL  Hemoglobin : 14.3 g/dL  Hematocrit : 44.7 %  Platelet Count - Automated : 187 K/uL  Mean Cell Volume : 97.0 fl  Mean Cell Hemoglobin : 31.0 pg  Mean Cell Hemoglobin Concentration : 32.0 gm/dL  Auto Neutrophil # : 5.52 K/uL  Auto Lymphocyte # : 0.62 K/uL  Auto Monocyte # : 0.26 K/uL  Auto Eosinophil # : 0.11 K/uL  Auto Basophil # : 0.01 K/uL  Auto Neutrophil % : 84.0 %  Auto Lymphocyte % : 9.5 %  Auto Monocyte % : 4.0 %  Auto Eosinophil % : 1.7 %  Auto Basophil % : 0.2 %    05-05    145  |  111<H>  |  35<H>  ----------------------------<  100<H>  4.4   |  26  |  1.27    Ca    8.8      05 May 2020 05:44  Phos  3.0     05-05  Mg     2.1     05-05    TPro  5.0<L>  /  Alb  2.9<L>  /  TBili  0.6  /  DBili  x   /  AST  24  /  ALT  37  /  AlkPhos  48  05-05                      RADIOLOGY & ADDITIONAL STUDIES:  Chest X-Ray 5/3  Chest CT 5/4/20 PULMONARY SERVICE INITIAL CONSULT NOTE    HPI:  75M PMH of DM presented for 1 week fo fevers. Patient's wife noted increase confusion, fatigue. + Fall- tripped and hit head. During the admission the patient had worsening hypoxemic respiratory failure requiring HFNC an admission to the intensive care unit. The patient was treated with Tocilizumab on 4/23/20, and is s/p azithro + Plaquenil. The patient also received a course of ABX for HAP. The patient had persistent ground glass opacities on CT scan and was difficult to wean from oxgyen and thus he was given pulse dose steroids that was completed on 5/1/20 and is currently being maintained on 40mg daily prednisone. The pulmonary service was asked to evaluate the patient given his slowly improving respiratory failure. The patient was examined and noted that overall his breathing is improving daily, he denies fever, cough, our malaise. No other acute complaints.       REVIEW OF SYSTEMS:  A 12 point ROS was reviewed with the patient and was negative except for what is mentioned above.     Allergy and Immunologic: No hives or eczema    PAST MEDICAL & SURGICAL HISTORY:  Type 2 diabetes mellitus      FAMILY HISTORY:      SOCIAL HISTORY:  Smoking Status: [ ] Current, [ ] Former, [ X] Never  Pack Years:    MEDICATIONS:  Pulmonary:    Antimicrobials:  trimethoprim  160 mG/sulfamethoxazole 800 mG 1 Tablet(s) Oral <User Schedule>    Anticoagulants:  aspirin  chewable 81 milliGRAM(s) Oral daily  enoxaparin Injectable 40 milliGRAM(s) SubCutaneous every 24 hours    Onc:    GI/:  bisacodyl Suppository 10 milliGRAM(s) Rectal daily  pantoprazole    Tablet 40 milliGRAM(s) Oral before breakfast  polyethylene glycol 3350 17 Gram(s) Oral daily  senna 2 Tablet(s) Oral at bedtime    Endocrine:  allopurinol 300 milliGRAM(s) Oral daily  atorvastatin 20 milliGRAM(s) Oral at bedtime  dextrose 40% Gel 15 Gram(s) Oral once PRN  dextrose 50% Injectable 12.5 Gram(s) IV Push once  dextrose 50% Injectable 25 Gram(s) IV Push once  dextrose 50% Injectable 25 Gram(s) IV Push once  glucagon  Injectable 1 milliGRAM(s) IntraMuscular once PRN  insulin glargine Injectable (LANTUS) 18 Unit(s) SubCutaneous at bedtime  insulin lispro (HumaLOG) corrective regimen sliding scale   SubCutaneous Before meals and at bedtime  levothyroxine 137 MICROGram(s) Oral every 24 hours  predniSONE   Tablet 40 milliGRAM(s) Oral daily    Cardiac:    Other Medications:  acetaminophen   Tablet .. 650 milliGRAM(s) Oral every 6 hours PRN  cholecalciferol 1000 Unit(s) Oral every 24 hours  dextrose 5%. 1000 milliLiter(s) IV Continuous <Continuous>  DULoxetine 20 milliGRAM(s) Oral two times a day  OLANZapine 5 milliGRAM(s) Oral at bedtime      Allergies    No Known Allergies    Intolerances        Vital Signs Last 24 Hrs  T(C): 36.3 (05 May 2020 04:55), Max: 36.9 (04 May 2020 13:58)  T(F): 97.4 (05 May 2020 04:55), Max: 98.4 (04 May 2020 13:58)  HR: 65 (05 May 2020 07:00) (63 - 93)  BP: 119/73 (05 May 2020 07:00) (116/79 - 160/73)  BP(mean): 88 (05 May 2020 07:00) (83 - 106)  RR: 15 (05 May 2020 07:00) (13 - 45)  SpO2: 94% (05 May 2020 07:00) (88% - 97%)    05-04 @ 07:01  -  05-05 @ 07:00  --------------------------------------------------------  IN: 870 mL / OUT: 1100 mL / NET: -230 mL          PHYSICAL EXAM:  Constitutional: WDWN  Head: NC/AT  EENT: PERRL, anicteric sclera; oropharynx clear, MMM  Neck: supple, no appreciable JVD  Respiratory: Decreased breath sounds b/l bases, no wheezes, no rhonchi.   Cardiovascular: +S1/S2, RRR  Gastrointestinal: soft, NT/ND; +BSx4  Extremities: WWP; no edema, clubbing or cyanosis  Vascular: 2+ radial, DP/PT pulses B/L  Neurological: AAOx3; no focal deficits    LABS:      CBC Full  -  ( 05 May 2020 05:44 )  WBC Count : 6.56 K/uL  RBC Count : 4.61 M/uL  Hemoglobin : 14.3 g/dL  Hematocrit : 44.7 %  Platelet Count - Automated : 187 K/uL  Mean Cell Volume : 97.0 fl  Mean Cell Hemoglobin : 31.0 pg  Mean Cell Hemoglobin Concentration : 32.0 gm/dL  Auto Neutrophil # : 5.52 K/uL  Auto Lymphocyte # : 0.62 K/uL  Auto Monocyte # : 0.26 K/uL  Auto Eosinophil # : 0.11 K/uL  Auto Basophil # : 0.01 K/uL  Auto Neutrophil % : 84.0 %  Auto Lymphocyte % : 9.5 %  Auto Monocyte % : 4.0 %  Auto Eosinophil % : 1.7 %  Auto Basophil % : 0.2 %    05-05    145  |  111<H>  |  35<H>  ----------------------------<  100<H>  4.4   |  26  |  1.27    Ca    8.8      05 May 2020 05:44  Phos  3.0     05-05  Mg     2.1     05-05    TPro  5.0<L>  /  Alb  2.9<L>  /  TBili  0.6  /  DBili  x   /  AST  24  /  ALT  37  /  AlkPhos  48  05-05        RADIOLOGY & ADDITIONAL STUDIES:  Chest X-Ray 5/3  Chest CT 5/4/20

## 2020-05-05 NOTE — PROGRESS NOTE ADULT - SUBJECTIVE AND OBJECTIVE BOX
OVERNIGHT EVENTS/INTERVAL HPI: RICHARD    SUBJECTIVE: Patient seen and examined by attending physician.    aspirin  chewable 81 milliGRAM(s) Oral daily  enoxaparin Injectable 40 milliGRAM(s) SubCutaneous every 24 hours  trimethoprim  160 mG/sulfamethoxazole 800 mG 1 Tablet(s) Oral <User Schedule>    MEDICATIONS  (PRN):  acetaminophen   Tablet .. 650 milliGRAM(s) Oral every 6 hours PRN Temp greater or equal to 38C (100.4F)  dextrose 40% Gel 15 Gram(s) Oral once PRN Blood Glucose LESS THAN 70 milliGRAM(s)/deciliter  glucagon  Injectable 1 milliGRAM(s) IntraMuscular once PRN Glucose LESS THAN 70 milligrams/deciliter      I&O's Detail    04 May 2020 07:01  -  05 May 2020 07:00  --------------------------------------------------------  IN:    IV PiggyBack: 400 mL    Oral Fluid: 470 mL  Total IN: 870 mL    OUT:    Voided: 1100 mL  Total OUT: 1100 mL    Total NET: -230 mL          Vital Signs Last 24 Hrs  T(C): 36.3 (05 May 2020 04:55), Max: 36.9 (04 May 2020 13:58)  T(F): 97.4 (05 May 2020 04:55), Max: 98.4 (04 May 2020 13:58)  HR: 65 (05 May 2020 07:00) (63 - 93)  BP: 119/73 (05 May 2020 07:00) (116/79 - 160/73)  BP(mean): 88 (05 May 2020 07:00) (83 - 106)  RR: 15 (05 May 2020 07:00) (13 - 45)  SpO2: 94% (05 May 2020 07:00) (88% - 97%)    Physical Exam: Per ICU attending    LABS:                        14.3   6.56  )-----------( 187      ( 05 May 2020 05:44 )             44.7     05-05    145  |  111<H>  |  35<H>  ----------------------------<  100<H>  4.4   |  26  |  1.27    Ca    8.8      05 May 2020 05:44  Phos  3.0     05-05  Mg     2.1     05-05    TPro  5.0<L>  /  Alb  2.9<L>  /  TBili  0.6  /  DBili  x   /  AST  24  /  ALT  37  /  AlkPhos  48  05-05          RADIOLOGY & ADDITIONAL STUDIES: HOSPITAL COURSE:     76yo M w/ PMH of DMII, ?sleep apnea (on CPAP o/n outpatient) who initially presented 4/21 for subjective fever x 1 wk, found to be COVID+ and admitted to [facility] for viral PNA 2/2 COVID-9; subsequently stepped up to COVID tele 4/25 for acute hypoxic respiratory failure w/ increased WOB on 15L NRB & increasing lethargy. Of note, patient was saturating well on RA on admission, subsequently requiring 4L NC 4/24, 15L NRB 4/25, HFNC + NRB after transfer to ICU. Initiated CPAP o/n 4/26, with poor patient compliance necessitating sedation. Hospital course c/b hyponatremia, since resolved; head CT completed for recent hx mechanical fall prior to adm, neg for acute pathology. GOC d/w patient and wife 4/26 and he is to remain FULL CODE. Continued on Zosyn (4/24-4/27) for HAP coverage given increasing O2 requirements, leukocytosis, L mid-lung consolidation on CXR. Stepped up to COVID ICU 4/27 for increased monitoring and high risk of intubation. AOx3 at baseline, NPO saturating 92% on CPAP at time of transfer.  MICU  Upon arrival to MICU he was restarted on zosyn x7 days and solumedrol x 3 days. He was awake proned 4/28 but stayed on BiPAP until 4/29, at which time he was transitioned to HFNC during the day. He was given plasma therapy on 4/29. Speech and swallow study on 4/30 cleared pt for puree nectar diet. Elevated D-dimer noted on 4/30 and a BLE duplex was obtained which was negative for DVT. PO steroids were started 5/2 and patient was successfully transitioned to nonrebreather. By 5/3 patient was staturating well on 6LNC and was OOB to chair. Diet was advanced to puree thin liquids on 5/4, however patient did not tolerate this and was switched back to nectars. CT chest 5/4 demonstrated interval resolution of ground-glass and linear opacities in RUL, however w/ new more extensive ground-glass and upper mid and lower lung zones, persistent small b/l pleural effusions. He will be stepped down 5/5 to telemetry as his breathing is improving daily and he denies fever, cough, or malaise. Active issues include hypernatremia (improved 145 today) and multifactorial delirium which manifests mostly as sundowning. Psychiatry was consulted and per their recommendations current care plan involves avoidance of antipsychotics given concern for QTc prolongation.         OVERNIGHT EVENTS/INTERVAL HPI: RICHARD    SUBJECTIVE: Patient seen and examined by attending physician.    aspirin  chewable 81 milliGRAM(s) Oral daily  enoxaparin Injectable 40 milliGRAM(s) SubCutaneous every 24 hours  trimethoprim  160 mG/sulfamethoxazole 800 mG 1 Tablet(s) Oral <User Schedule>    MEDICATIONS  (PRN):  acetaminophen   Tablet .. 650 milliGRAM(s) Oral every 6 hours PRN Temp greater or equal to 38C (100.4F)  dextrose 40% Gel 15 Gram(s) Oral once PRN Blood Glucose LESS THAN 70 milliGRAM(s)/deciliter  glucagon  Injectable 1 milliGRAM(s) IntraMuscular once PRN Glucose LESS THAN 70 milligrams/deciliter      I&O's Detail    04 May 2020 07:01  -  05 May 2020 07:00  --------------------------------------------------------  IN:    IV PiggyBack: 400 mL    Oral Fluid: 470 mL  Total IN: 870 mL    OUT:    Voided: 1100 mL  Total OUT: 1100 mL    Total NET: -230 mL          Vital Signs Last 24 Hrs  T(C): 36.3 (05 May 2020 04:55), Max: 36.9 (04 May 2020 13:58)  T(F): 97.4 (05 May 2020 04:55), Max: 98.4 (04 May 2020 13:58)  HR: 65 (05 May 2020 07:00) (63 - 93)  BP: 119/73 (05 May 2020 07:00) (116/79 - 160/73)  BP(mean): 88 (05 May 2020 07:00) (83 - 106)  RR: 15 (05 May 2020 07:00) (13 - 45)  SpO2: 94% (05 May 2020 07:00) (88% - 97%)    Physical Exam: Per ICU attending    LABS:                        14.3   6.56  )-----------( 187      ( 05 May 2020 05:44 )             44.7     05-05    145  |  111<H>  |  35<H>  ----------------------------<  100<H>  4.4   |  26  |  1.27    Ca    8.8      05 May 2020 05:44  Phos  3.0     05-05  Mg     2.1     05-05    TPro  5.0<L>  /  Alb  2.9<L>  /  TBili  0.6  /  DBili  x   /  AST  24  /  ALT  37  /  AlkPhos  48  05-05          RADIOLOGY & ADDITIONAL STUDIES: HOSPITAL COURSE:     76yo M w/ PMH of DMII, ?sleep apnea (on CPAP o/n outpatient) who initially presented 4/21 for subjective fever x 1 wk, found to be COVID+ and admitted to [facility] for viral PNA 2/2 COVID-9; subsequently stepped up to COVID tele 4/25 for acute hypoxic respiratory failure w/ increased WOB on 15L NRB & increasing lethargy. Of note, patient was saturating well on RA on admission, subsequently requiring 4L NC 4/24, 15L NRB 4/25, HFNC + NRB after transfer to ICU. Initiated CPAP o/n 4/26, with poor patient compliance necessitating sedation. Hospital course c/b hyponatremia, since resolved; head CT completed for recent hx mechanical fall prior to adm, neg for acute pathology. GOC d/w patient and wife 4/26 and he is to remain FULL CODE. Continued on Zosyn (4/24-4/27) for HAP coverage given increasing O2 requirements, leukocytosis, L mid-lung consolidation on CXR. Stepped up to COVID ICU 4/27 for increased monitoring and high risk of intubation. AOx3 at baseline, NPO saturating 92% on CPAP at time of transfer.  MICU  Upon arrival to MICU he was restarted on zosyn x7 days and solumedrol x 3 days. He was awake proned 4/28 but stayed on BiPAP until 4/29, at which time he was transitioned to HFNC during the day. He was given plasma therapy on 4/29. Speech and swallow study on 4/30 cleared pt for puree nectar diet. Elevated D-dimer noted on 4/30 and a BLE duplex was obtained which was negative for DVT. PO steroids were started 5/2 and patient was successfully transitioned to nonrebreather. By 5/3 patient was staturating well on 6LNC and was OOB to chair. Diet was advanced to puree thin liquids on 5/4, however patient did not tolerate this and was switched back to nectars. CT chest 5/4 demonstrated interval resolution of ground-glass and linear opacities in RUL, however w/ new more extensive ground-glass and upper mid and lower lung zones, persistent small b/l pleural effusions. He will be stepped down 5/5 to telemetry as his breathing is improving daily and he denies fever, cough, or malaise. Active issues include hypernatremia (improved 145 today) and multifactorial delirium which manifests mostly as sundowning. Psychiatry was consulted and per their recommendations current care plan involves avoidance of antipsychotics given concern for QTc prolongation.         OVERNIGHT EVENTS/INTERVAL HPI: RICHARD    SUBJECTIVE: Patient seen and examined by attending physician.    aspirin  chewable 81 milliGRAM(s) Oral daily  enoxaparin Injectable 40 milliGRAM(s) SubCutaneous every 24 hours  trimethoprim  160 mG/sulfamethoxazole 800 mG 1 Tablet(s) Oral <User Schedule>    MEDICATIONS  (PRN):  acetaminophen   Tablet .. 650 milliGRAM(s) Oral every 6 hours PRN Temp greater or equal to 38C (100.4F)  dextrose 40% Gel 15 Gram(s) Oral once PRN Blood Glucose LESS THAN 70 milliGRAM(s)/deciliter  glucagon  Injectable 1 milliGRAM(s) IntraMuscular once PRN Glucose LESS THAN 70 milligrams/deciliter      I&O's Detail    04 May 2020 07:01  -  05 May 2020 07:00  --------------------------------------------------------  IN:    IV PiggyBack: 400 mL    Oral Fluid: 470 mL  Total IN: 870 mL    OUT:    Voided: 1100 mL  Total OUT: 1100 mL    Total NET: -230 mL          Vital Signs Last 24 Hrs  T(C): 36.3 (05 May 2020 04:55), Max: 36.9 (04 May 2020 13:58)  T(F): 97.4 (05 May 2020 04:55), Max: 98.4 (04 May 2020 13:58)  HR: 65 (05 May 2020 07:00) (63 - 93)  BP: 119/73 (05 May 2020 07:00) (116/79 - 160/73)  BP(mean): 88 (05 May 2020 07:00) (83 - 106)  RR: 15 (05 May 2020 07:00) (13 - 45)  SpO2: 94% (05 May 2020 07:00) (88% - 97%)    Physical Exam:   awake and interactive with prompting  HEENT: PERRL, EOMI, MMD  neck supple  lungs clear  RRR S1S2 no murmur  abd soft NT ND +BS  ext no CCE  skin: no rash   normal male  psych: somewhat withdrawn but responds appropriately  LABS:                        14.3   6.56  )-----------( 187      ( 05 May 2020 05:44 )             44.7     05-05    145  |  111<H>  |  35<H>  ----------------------------<  100<H>  4.4   |  26  |  1.27    Ca    8.8      05 May 2020 05:44  Phos  3.0     05-05  Mg     2.1     05-05    TPro  5.0<L>  /  Alb  2.9<L>  /  TBili  0.6  /  DBili  x   /  AST  24  /  ALT  37  /  AlkPhos  48  05-05          RADIOLOGY & ADDITIONAL STUDIES:

## 2020-05-06 PROCEDURE — 99233 SBSQ HOSP IP/OBS HIGH 50: CPT | Mod: CS

## 2020-05-06 RX ADMIN — SENNA PLUS 2 TABLET(S): 8.6 TABLET ORAL at 23:05

## 2020-05-06 RX ADMIN — Medication 137 MICROGRAM(S): at 08:39

## 2020-05-06 RX ADMIN — FAMOTIDINE 20 MILLIGRAM(S): 10 INJECTION INTRAVENOUS at 13:02

## 2020-05-06 RX ADMIN — DULOXETINE HYDROCHLORIDE 20 MILLIGRAM(S): 30 CAPSULE, DELAYED RELEASE ORAL at 18:15

## 2020-05-06 RX ADMIN — ATORVASTATIN CALCIUM 20 MILLIGRAM(S): 80 TABLET, FILM COATED ORAL at 23:05

## 2020-05-06 RX ADMIN — ENOXAPARIN SODIUM 40 MILLIGRAM(S): 100 INJECTION SUBCUTANEOUS at 18:15

## 2020-05-06 RX ADMIN — INSULIN GLARGINE 18 UNIT(S): 100 INJECTION, SOLUTION SUBCUTANEOUS at 23:06

## 2020-05-06 RX ADMIN — Medication 81 MILLIGRAM(S): at 13:02

## 2020-05-06 RX ADMIN — DULOXETINE HYDROCHLORIDE 20 MILLIGRAM(S): 30 CAPSULE, DELAYED RELEASE ORAL at 04:25

## 2020-05-06 RX ADMIN — Medication 4: at 13:05

## 2020-05-06 RX ADMIN — OLANZAPINE 5 MILLIGRAM(S): 15 TABLET, FILM COATED ORAL at 23:06

## 2020-05-06 RX ADMIN — Medication 1000 UNIT(S): at 18:14

## 2020-05-06 RX ADMIN — POLYETHYLENE GLYCOL 3350 17 GRAM(S): 17 POWDER, FOR SOLUTION ORAL at 13:02

## 2020-05-06 RX ADMIN — Medication 1 TABLET(S): at 13:02

## 2020-05-06 RX ADMIN — Medication 40 MILLIGRAM(S): at 04:26

## 2020-05-06 RX ADMIN — Medication 300 MILLIGRAM(S): at 13:01

## 2020-05-06 NOTE — CHART NOTE - NSCHARTNOTEFT_GEN_A_CORE
Admitting Diagnosis:   Patient is a 75y old  Male who presents with a chief complaint of covid (01 May 2020 08:21)      PAST MEDICAL & SURGICAL HISTORY:  Type 2 diabetes mellitus      Current Nutrition Order:  Puree NTL 5/4     PO Intake: Excellent (%) [   ]  Good (50-75%) [   ]  Fair (25-50%) [   ]  Poor (<25%) [ X ]  Please see below     GI Issues:  RN denies NVDC  Flow sheets: +BM 5/ (medium soft brown stool)   Meds noted     Pain:  None per flow sheets     Skin Integrity:  1+ generalized  no pressure ulcers     Labs:       145  |  111<H>  |  35<H>  ----------------------------<  100<H>  4.4   |  26  |  1.27    Ca    8.8      05 May 2020 05:44  Phos  3.0     -  Mg     2.1     -    TPro  5.0<L>  /  Alb  2.9<L>  /  TBili  0.6  /  DBili  x   /  AST  24  /  ALT  37  /  AlkPhos  48  -    CAPILLARY BLOOD GLUCOSE      POCT Blood Glucose.: 104 mg/dL (06 May 2020 06:03)  POCT Blood Glucose.: 151 mg/dL (05 May 2020 20:57)  POCT Blood Glucose.: 195 mg/dL (05 May 2020 16:16)  POCT Blood Glucose.: 143 mg/dL (05 May 2020 11:55)      Medications:  MEDICATIONS  (STANDING):  allopurinol 300 milliGRAM(s) Oral daily  aspirin  chewable 81 milliGRAM(s) Oral daily  atorvastatin 20 milliGRAM(s) Oral at bedtime  bisacodyl Suppository 10 milliGRAM(s) Rectal daily  cholecalciferol 1000 Unit(s) Oral every 24 hours  dextrose 5%. 1000 milliLiter(s) (50 mL/Hr) IV Continuous <Continuous>  dextrose 50% Injectable 12.5 Gram(s) IV Push once  dextrose 50% Injectable 25 Gram(s) IV Push once  dextrose 50% Injectable 25 Gram(s) IV Push once  DULoxetine 20 milliGRAM(s) Oral two times a day  enoxaparin Injectable 40 milliGRAM(s) SubCutaneous every 24 hours  famotidine    Tablet 20 milliGRAM(s) Oral daily  insulin glargine Injectable (LANTUS) 18 Unit(s) SubCutaneous at bedtime  insulin lispro (HumaLOG) corrective regimen sliding scale   SubCutaneous Before meals and at bedtime  levothyroxine 137 MICROGram(s) Oral every 24 hours  OLANZapine 5 milliGRAM(s) Oral at bedtime  polyethylene glycol 3350 17 Gram(s) Oral daily  predniSONE   Tablet 40 milliGRAM(s) Oral daily  senna 2 Tablet(s) Oral at bedtime  trimethoprim  160 mG/sulfamethoxazole 800 mG 1 Tablet(s) Oral <User Schedule>    MEDICATIONS  (PRN):  acetaminophen   Tablet .. 650 milliGRAM(s) Oral every 6 hours PRN Temp greater or equal to 38C (100.4F)  dextrose 40% Gel 15 Gram(s) Oral once PRN Blood Glucose LESS THAN 70 milliGRAM(s)/deciliter  glucagon  Injectable 1 milliGRAM(s) IntraMuscular once PRN Glucose LESS THAN 70 milligrams/deciliter      6'0''  IBW:178 pounds +/-10%,   190 pounds   BMI 25.8, 107% of IBW  Wt Change: No no EMR wts at this time. Stated per wife patients  pounds and was weighing about 182 pounds PTA    Nutrition Focused Physical Exam: Completed [   ]  Unable to complete [  x ]  Unable to complete due to isolation demands and unable to do a face to face, However per visual exam pt noted with moderate/severe wasting to clavicle region. Please see malnutrition note placed .     Estimated energy needs  Based on ABW due to between % of IBW  Adjusted for Age based on COVID vital infection, adjusted for suspected malnutrition, fluids per team   25-30kcal: 2150kcal-2580kcal/day  1.2-1.4gmprotein/k-113gmprotein/day -- trend renal indices     Subjective:  76y/o male with history of T2DM (A1c 6.6%) admitted with change in mental status and hyponatremia s/p fall and found to have COVID-19+ (Consented for Plasma). +Awake pron-ing. Pt given plasma therapy . Pt with Toxic Metabolic Encephalopathy 2/2 hyponatremia, however CT head negative. Noted Delirium / sundowning - Psychiatry was consulted. Pt stepped up to ICU for closer monitoring. Noted LIZ- Possible COVID component to kidney injury as well (BUN 35, Cr WDL), however ATN largely resolved. Pt Switched from NRB to 6L NC 5/3, however flow sheets indicate use of venturi mask . CT chest  with interval resolution of ground-glass and linear opacities RUL + new more extensive ground-glass upper/mid/lower lung zones, persistent small b/l pleural effusions; pt slowly improving and now is s/p pulse dose steroids, noted will need to be weaned from steroids likely over a period of weeks. Last 3 POCT 104 151 195 - assume d/t steroid use given decreased PO intake.   Pt is s/p Swallow Eval  with recs for Puree/nectar-thick liquid + noted will require careful hand-feeding with modified texture diet to reduce aspiration risk. Most recent SLP visit  with recs for puree/thin liquids, however pt ordered for puree/NTL - per chart changed back as pt did not tolerated. Spoke with RN/PCA, reports limited PO intake today during breakfast, consumed ~25% of meal (did not eat protein provided), however did tolerate.   Please see below for nutritions recommendations. Pending order placed - Paged Team for Recs.     Prior Nutrition Diagnosis: Increased nutrient needs r/t increased demand for protein and nutrients AEB: COVID-19+ hypermetabolic demands  ON GOING AT THIS TIME   Prior Nutrition Diagnosis: Inadequate oral Intake r/t decreased ability for meals 2/2 mental status AEB 0% PO intake today   Inadequate oral is ON GOING AT THIS TIME as pt remains with poor PO intake    Goal: Meet >75% of EER consistently via feasible route     Recommendations:  1. Question need for SLP f/u visit given noted issues with tolerance to thin liquids s/p diet upgrade  ** Please note however RN reports pt is tolerating puree diet with NTL (prior recommended diet) at this time with limited PO intake. Should team not feel SLP f/u visit needed at this time d/t reported tolerance to current diet and PO diet to remains would recommend use of oral nutrition supplements given poor PO intake, suggest adding Glucerna x3/day (220kcal/10gm prot per 1 can).    2. While PO diet safe/feasible, recommend to Maintain ASP Precautions at all times. Appreciate assistance during meals PRN. Monitor for %PO and diet tolerance.   3. Monitor Labs, Wts, Skin, GI distress, GOC.  4. MVI daily.   5. RD to remain available for additional nutrition interventions as needed.     Education: NA   Risk Level: High [ X ] Moderate [   ] Low [   ].

## 2020-05-06 NOTE — PROGRESS NOTE ADULT - SUBJECTIVE AND OBJECTIVE BOX
**Incomplete Note**    INTERVAL HPI/OVERNIGHT EVENTS:  Patient was seen and examined at bedside. As per nurse and patient, no o/n events, patient resting comfortably. No complaints at this time. Patient denies: fever, chills, dizziness, weakness, HA, Changes in vision, CP, palpitations, SOB, cough, N/V/D/C, dysuria, changes in bowel movements, LE edema. ROS otherwise negative.    VITAL SIGNS:  T(F): 97.4 (05-06-20 @ 06:00)  HR: 60 (05-06-20 @ 04:15)  BP: 125/84 (05-06-20 @ 04:15)  RR: 22 (05-06-20 @ 04:15)  SpO2: 89% (05-06-20 @ 04:15)  Wt(kg): --      05-04-20 @ 07:01  -  05-05-20 @ 07:00  --------------------------------------------------------  IN: 870 mL / OUT: 1100 mL / NET: -230 mL    05-05-20 @ 07:01  -  05-06-20 @ 06:55  --------------------------------------------------------  IN: 0 mL / OUT: 700 mL / NET: -700 mL        PHYSICAL EXAM:    Constitutional: WDWN resting comfortably in bed; NAD  HEENT: NC/AT, PERRL, EOMI, anicteric sclera, no nasal discharge; uvula midline, no oropharyngeal erythema or exudates; MMM  Neck: supple; no JVD or thyromegaly  Respiratory: CTA B/L; no W/R/R, no retractions  Cardiac: +S1/S2; RRR; no M/R/G; PMI non-displaced  Gastrointestinal: soft, NT/ND; no rebound or guarding; +BSx4  Genitourinary: normal external genitalia  Back: spine midline, no bony tenderness or step-offs; no CVAT B/L  Extremities: WWP, no clubbing or cyanosis; no peripheral edema  Musculoskeletal: NROM x4; no joint swelling, tenderness or erythema  Vascular: 2+ radial, DP/PT pulses B/L  Dermatologic: skin warm, dry and intact; no rashes, wounds, or scars  Lymphatic: no submandibular or cervical LAD  Neurologic: AAOx3; CNII-XII grossly intact; no focal deficits  Psychiatric: affect and characteristics of appearance, verbalizations, behaviors are appropriate, denies SI/HI/AH/VH    MEDICATIONS  (STANDING):  allopurinol 300 milliGRAM(s) Oral daily  aspirin  chewable 81 milliGRAM(s) Oral daily  atorvastatin 20 milliGRAM(s) Oral at bedtime  bisacodyl Suppository 10 milliGRAM(s) Rectal daily  cholecalciferol 1000 Unit(s) Oral every 24 hours  dextrose 5%. 1000 milliLiter(s) (50 mL/Hr) IV Continuous <Continuous>  dextrose 50% Injectable 12.5 Gram(s) IV Push once  dextrose 50% Injectable 25 Gram(s) IV Push once  dextrose 50% Injectable 25 Gram(s) IV Push once  DULoxetine 20 milliGRAM(s) Oral two times a day  enoxaparin Injectable 40 milliGRAM(s) SubCutaneous every 24 hours  famotidine    Tablet 20 milliGRAM(s) Oral daily  insulin glargine Injectable (LANTUS) 18 Unit(s) SubCutaneous at bedtime  insulin lispro (HumaLOG) corrective regimen sliding scale   SubCutaneous Before meals and at bedtime  levothyroxine 137 MICROGram(s) Oral every 24 hours  OLANZapine 5 milliGRAM(s) Oral at bedtime  polyethylene glycol 3350 17 Gram(s) Oral daily  predniSONE   Tablet 40 milliGRAM(s) Oral daily  senna 2 Tablet(s) Oral at bedtime  trimethoprim  160 mG/sulfamethoxazole 800 mG 1 Tablet(s) Oral <User Schedule>    MEDICATIONS  (PRN):  acetaminophen   Tablet .. 650 milliGRAM(s) Oral every 6 hours PRN Temp greater or equal to 38C (100.4F)  dextrose 40% Gel 15 Gram(s) Oral once PRN Blood Glucose LESS THAN 70 milliGRAM(s)/deciliter  glucagon  Injectable 1 milliGRAM(s) IntraMuscular once PRN Glucose LESS THAN 70 milligrams/deciliter      Allergies    No Known Allergies    Intolerances        LABS:                        14.3   6.56  )-----------( 187      ( 05 May 2020 05:44 )             44.7     05-05    145  |  111<H>  |  35<H>  ----------------------------<  100<H>  4.4   |  26  |  1.27    Ca    8.8      05 May 2020 05:44  Phos  3.0     05-05  Mg     2.1     05-05    TPro  5.0<L>  /  Alb  2.9<L>  /  TBili  0.6  /  DBili  x   /  AST  24  /  ALT  37  /  AlkPhos  48  05-05          RADIOLOGY & ADDITIONAL TESTS:  Reviewed INTERVAL HPI/OVERNIGHT EVENTS:  Patient was seen and examined at bedside. As per nurse and night team, RICHARD, patient resting comfortably. No complaints at this time. Cannot obtain ROS d/t mental status of pt.     VITAL SIGNS:  T(F): 97.4 (05-06-20 @ 06:00)  HR: 60 (05-06-20 @ 04:15)  BP: 125/84 (05-06-20 @ 04:15)  RR: 22 (05-06-20 @ 04:15)  SpO2: 89% (05-06-20 @ 04:15)  Wt(kg): --      05-04-20 @ 07:01  -  05-05-20 @ 07:00  --------------------------------------------------------  IN: 870 mL / OUT: 1100 mL / NET: -230 mL    05-05-20 @ 07:01  -  05-06-20 @ 06:55  --------------------------------------------------------  IN: 0 mL / OUT: 700 mL / NET: -700 mL        PHYSICAL EXAM:    Constitutional: Elderly gentleman resting comfortably in bed; NAD on 6L   HEENT: NC/AT, PERRL, EOMI, anicteric sclera, no nasal discharge; uvula midline, no oropharyngeal erythema or exudates; MMM  Neck: supple; no JVD or thyromegaly  Respiratory: CTA B/L; no W/R/R, no retractions  Cardiac: +S1/S2; RRR; no M/R/G; PMI non-displaced  Gastrointestinal: soft, NT/ND; no rebound or guarding; +BSx4  Extremities: WWP, no clubbing or cyanosis; no peripheral edema  Musculoskeletal: NROM x4; no joint swelling, tenderness or erythema  Vascular: 2+ radial, DP/PT pulses B/L  Dermatologic: skin warm, dry and intact; no rashes, wounds, or scars  Lymphatic: no submandibular or cervical LAD  Neurologic: AAOx3; CNII-XII grossly intact; no focal deficits  Psychiatric: flat affect     MEDICATIONS  (STANDING):  allopurinol 300 milliGRAM(s) Oral daily  aspirin  chewable 81 milliGRAM(s) Oral daily  atorvastatin 20 milliGRAM(s) Oral at bedtime  bisacodyl Suppository 10 milliGRAM(s) Rectal daily  cholecalciferol 1000 Unit(s) Oral every 24 hours  dextrose 5%. 1000 milliLiter(s) (50 mL/Hr) IV Continuous <Continuous>  dextrose 50% Injectable 12.5 Gram(s) IV Push once  dextrose 50% Injectable 25 Gram(s) IV Push once  dextrose 50% Injectable 25 Gram(s) IV Push once  DULoxetine 20 milliGRAM(s) Oral two times a day  enoxaparin Injectable 40 milliGRAM(s) SubCutaneous every 24 hours  famotidine    Tablet 20 milliGRAM(s) Oral daily  insulin glargine Injectable (LANTUS) 18 Unit(s) SubCutaneous at bedtime  insulin lispro (HumaLOG) corrective regimen sliding scale   SubCutaneous Before meals and at bedtime  levothyroxine 137 MICROGram(s) Oral every 24 hours  OLANZapine 5 milliGRAM(s) Oral at bedtime  polyethylene glycol 3350 17 Gram(s) Oral daily  predniSONE   Tablet 40 milliGRAM(s) Oral daily  senna 2 Tablet(s) Oral at bedtime  trimethoprim  160 mG/sulfamethoxazole 800 mG 1 Tablet(s) Oral <User Schedule>    MEDICATIONS  (PRN):  acetaminophen   Tablet .. 650 milliGRAM(s) Oral every 6 hours PRN Temp greater or equal to 38C (100.4F)  dextrose 40% Gel 15 Gram(s) Oral once PRN Blood Glucose LESS THAN 70 milliGRAM(s)/deciliter  glucagon  Injectable 1 milliGRAM(s) IntraMuscular once PRN Glucose LESS THAN 70 milligrams/deciliter      Allergies    No Known Allergies    Intolerances        LABS:                        14.3   6.56  )-----------( 187      ( 05 May 2020 05:44 )             44.7     05-05    145  |  111<H>  |  35<H>  ----------------------------<  100<H>  4.4   |  26  |  1.27    Ca    8.8      05 May 2020 05:44  Phos  3.0     05-05  Mg     2.1     05-05    TPro  5.0<L>  /  Alb  2.9<L>  /  TBili  0.6  /  DBili  x   /  AST  24  /  ALT  37  /  AlkPhos  48  05-05          RADIOLOGY & ADDITIONAL TESTS:  Reviewed

## 2020-05-06 NOTE — PROGRESS NOTE ADULT - ASSESSMENT
75M PMH DM2 hypoxic respiratory failure 2/2 covid PNA on bipap. ccb hyponatremia, and hypernatremia    Neuro:      CVS:    Hemodynamics:    Pulm:  #Acute hypoxemic Respiratory failure      #COVID pneumonia        GI:  -ppx      /Renal:  #Hyponatremia      Endo:      ID:      Hematology:      MSK    Other:  F- none  E-replete as needed  N-   C- FC  DVt ppx: lovenox     Access:    Dispo: MICU 75M PMH DM2 hypoxic respiratory failure 2/2 covid PNA on bipap. ccb hyponatremia, and hypernatremia    Neuro:      CVS:  #HTN    #HLD    Hemodynamics:    Pulm:  #Acute hypoxemic Respiratory failure      #COVID pneumonia      #HAP        GI:  -ppx      /Renal:  #Hyponatremia      Endo:      ID:      Hematology:      MSK    Other:  F- none  E-replete as needed  N-   C- FC  DVt ppx: lovenox     Access:    Dispo: MICU 75M PMH DM2 hypoxic respiratory failure 2/2 covid PNA on bipap. ccb hyponatremia, and hypernatremia    Neuro:    #Hyperactive delirium- marked sundowning, improving on therapy  -c/w zyprexa 5mg qd and cymbalta 20mg BID    CVS:  RICHARD    #HLD  -c/w atorvastatin 20mg    Hemodynamics:    Pulm:  #Acute hypoxemic Respiratory failure      #COVID pneumonia      #HAP        GI:  -ppx      /Renal:  #Hyponatremia      Endo:      ID:      Hematology:      MSK    Other:  F- none  E-replete as needed  N-   C- FC  DVt ppx: lovenox     Access:    Dispo: MICU 75M PMH DM2 hypoxic respiratory failure 2/2 covid PNA on bipap. ccb hyponatremia, and hypernatremia now stepped down from 8Ea    Neuro:    #Hyperactive delirium- marked sundowning, improving on therapy  -c/w Zyprexa 5mg qd and Cymbalta 20mg BID    CVS:  RICHARD    #HLD  -c/wn ASA 81mg and  atorvastatin 20mg    Hemodynamics:  Perfusion: warm and well perfused  Volume status: euvolemic     Pulm:  #Acute hypoxemic Respiratory failure- requiring NRB then bipap  -CT chest 5/4 demonstrated interval resolution of ground-glass and linear opacities in RUL, however w/ new more extensive ground-glass and upper mid and lower lung zones, persistent small b/l pleural effusion  -on 6L NC, titrate to maintain sp02 above 90-92%  -OOBTC, IS  -s/p solumedrol 125q6h x 3d (4/28--4/30  -40mg prednisone w/eventual out pt taper and PCP ppx    #HAP- s/p Cefdopoxime/Zosyn (4/22-4/27)  -non toxic, no leucocytosis  -continue to monitor     #COVID pneumonia- s/p Cefdopoxime/Zosyn (4/22-4/27), Plaquenil (4/21-4/25), Azithro (4/2104/25), Toci 4/23      GI:  -DM puree nectar (breakfast and lunch only)  -Protonix ppx      /Renal:  #Hypernatremia- likely 2/2 to poor PO intake (RESOLVED)      Endo:  #NIDDM  -ISS, Lantus 18u, Allopurinol    #Hypothyroidism- Synthroid, )       ID:  RICHARD    Hematology:  RICHARD    Other:  F- none  E-replete as needed  N- pureed nectar   C- FC  DVt ppx: lovenox     Access:    Dispo: MICU

## 2020-05-07 LAB
24R-OH-CALCIDIOL SERPL-MCNC: 39 NG/ML — SIGNIFICANT CHANGE UP (ref 30–80)
ALBUMIN SERPL ELPH-MCNC: 2.9 G/DL — LOW (ref 3.3–5)
ALP SERPL-CCNC: 53 U/L — SIGNIFICANT CHANGE UP (ref 40–120)
ALT FLD-CCNC: 51 U/L — HIGH (ref 10–45)
ANION GAP SERPL CALC-SCNC: 12 MMOL/L — SIGNIFICANT CHANGE UP (ref 5–17)
AST SERPL-CCNC: 35 U/L — SIGNIFICANT CHANGE UP (ref 10–40)
BASOPHILS # BLD AUTO: 0.01 K/UL — SIGNIFICANT CHANGE UP (ref 0–0.2)
BASOPHILS NFR BLD AUTO: 0.1 % — SIGNIFICANT CHANGE UP (ref 0–2)
BILIRUB SERPL-MCNC: 0.7 MG/DL — SIGNIFICANT CHANGE UP (ref 0.2–1.2)
BUN SERPL-MCNC: 30 MG/DL — HIGH (ref 7–23)
CALCIUM SERPL-MCNC: 9.3 MG/DL — SIGNIFICANT CHANGE UP (ref 8.4–10.5)
CHLORIDE SERPL-SCNC: 106 MMOL/L — SIGNIFICANT CHANGE UP (ref 96–108)
CO2 SERPL-SCNC: 22 MMOL/L — SIGNIFICANT CHANGE UP (ref 22–31)
CREAT SERPL-MCNC: 1.11 MG/DL — SIGNIFICANT CHANGE UP (ref 0.5–1.3)
CRP SERPL-MCNC: 0.13 MG/DL — SIGNIFICANT CHANGE UP (ref 0–0.4)
D DIMER BLD IA.RAPID-MCNC: 250 NG/ML DDU — HIGH
EOSINOPHIL # BLD AUTO: 0.14 K/UL — SIGNIFICANT CHANGE UP (ref 0–0.5)
EOSINOPHIL NFR BLD AUTO: 2.1 % — SIGNIFICANT CHANGE UP (ref 0–6)
FERRITIN SERPL-MCNC: 1523 NG/ML — HIGH (ref 30–400)
GLUCOSE SERPL-MCNC: 141 MG/DL — HIGH (ref 70–99)
HCT VFR BLD CALC: 46.9 % — SIGNIFICANT CHANGE UP (ref 39–50)
HGB BLD-MCNC: 15.5 G/DL — SIGNIFICANT CHANGE UP (ref 13–17)
IMM GRANULOCYTES NFR BLD AUTO: 0.3 % — SIGNIFICANT CHANGE UP (ref 0–1.5)
LYMPHOCYTES # BLD AUTO: 1 K/UL — SIGNIFICANT CHANGE UP (ref 1–3.3)
LYMPHOCYTES # BLD AUTO: 14.7 % — SIGNIFICANT CHANGE UP (ref 13–44)
MAGNESIUM SERPL-MCNC: 1.9 MG/DL — SIGNIFICANT CHANGE UP (ref 1.6–2.6)
MCHC RBC-ENTMCNC: 30.8 PG — SIGNIFICANT CHANGE UP (ref 27–34)
MCHC RBC-ENTMCNC: 33 GM/DL — SIGNIFICANT CHANGE UP (ref 32–36)
MCV RBC AUTO: 93.2 FL — SIGNIFICANT CHANGE UP (ref 80–100)
MONOCYTES # BLD AUTO: 0.54 K/UL — SIGNIFICANT CHANGE UP (ref 0–0.9)
MONOCYTES NFR BLD AUTO: 7.9 % — SIGNIFICANT CHANGE UP (ref 2–14)
NEUTROPHILS # BLD AUTO: 5.09 K/UL — SIGNIFICANT CHANGE UP (ref 1.8–7.4)
NEUTROPHILS NFR BLD AUTO: 74.9 % — SIGNIFICANT CHANGE UP (ref 43–77)
NRBC # BLD: 0 /100 WBCS — SIGNIFICANT CHANGE UP (ref 0–0)
PHOSPHATE SERPL-MCNC: 2.8 MG/DL — SIGNIFICANT CHANGE UP (ref 2.5–4.5)
PLATELET # BLD AUTO: 156 K/UL — SIGNIFICANT CHANGE UP (ref 150–400)
POTASSIUM SERPL-MCNC: 4.4 MMOL/L — SIGNIFICANT CHANGE UP (ref 3.5–5.3)
POTASSIUM SERPL-SCNC: 4.4 MMOL/L — SIGNIFICANT CHANGE UP (ref 3.5–5.3)
PROT SERPL-MCNC: 5.3 G/DL — LOW (ref 6–8.3)
RBC # BLD: 5.03 M/UL — SIGNIFICANT CHANGE UP (ref 4.2–5.8)
RBC # FLD: 14.1 % — SIGNIFICANT CHANGE UP (ref 10.3–14.5)
SODIUM SERPL-SCNC: 140 MMOL/L — SIGNIFICANT CHANGE UP (ref 135–145)
WBC # BLD: 6.8 K/UL — SIGNIFICANT CHANGE UP (ref 3.8–10.5)
WBC # FLD AUTO: 6.8 K/UL — SIGNIFICANT CHANGE UP (ref 3.8–10.5)

## 2020-05-07 PROCEDURE — 99233 SBSQ HOSP IP/OBS HIGH 50: CPT | Mod: CS

## 2020-05-07 RX ADMIN — OLANZAPINE 5 MILLIGRAM(S): 15 TABLET, FILM COATED ORAL at 22:21

## 2020-05-07 RX ADMIN — Medication 4: at 11:10

## 2020-05-07 RX ADMIN — ENOXAPARIN SODIUM 40 MILLIGRAM(S): 100 INJECTION SUBCUTANEOUS at 17:09

## 2020-05-07 RX ADMIN — Medication 300 MILLIGRAM(S): at 09:02

## 2020-05-07 RX ADMIN — Medication 137 MICROGRAM(S): at 05:59

## 2020-05-07 RX ADMIN — Medication 2: at 22:20

## 2020-05-07 RX ADMIN — DULOXETINE HYDROCHLORIDE 20 MILLIGRAM(S): 30 CAPSULE, DELAYED RELEASE ORAL at 17:10

## 2020-05-07 RX ADMIN — Medication 40 MILLIGRAM(S): at 05:59

## 2020-05-07 RX ADMIN — Medication 81 MILLIGRAM(S): at 09:02

## 2020-05-07 RX ADMIN — FAMOTIDINE 20 MILLIGRAM(S): 10 INJECTION INTRAVENOUS at 09:01

## 2020-05-07 RX ADMIN — Medication 4: at 17:08

## 2020-05-07 RX ADMIN — INSULIN GLARGINE 18 UNIT(S): 100 INJECTION, SOLUTION SUBCUTANEOUS at 22:20

## 2020-05-07 RX ADMIN — DULOXETINE HYDROCHLORIDE 20 MILLIGRAM(S): 30 CAPSULE, DELAYED RELEASE ORAL at 05:59

## 2020-05-07 RX ADMIN — Medication 1000 UNIT(S): at 17:09

## 2020-05-07 NOTE — PROGRESS NOTE ADULT - ASSESSMENT
75M PMH DM2 hypoxic respiratory failure 2/2 covid PNA on bipap. ccb hyponatremia, and hypernatremia now stepped down from 8Ea    Neuro:    #Hyperactive delirium- marked sundowning, improving on therapy  -c/w Zyprexa 5mg qd and Cymbalta 20mg BID    CVS:  RICHARD    #HLD  -c/wn ASA 81mg and  atorvastatin 20mg    Hemodynamics:  Perfusion: warm and well perfused  Volume status: euvolemic     Pulm:  #Acute hypoxemic Respiratory failure- requiring NRB then bipap  -CT chest 5/4 demonstrated interval resolution of ground-glass and linear opacities in RUL, however w/ new more extensive ground-glass and upper mid and lower lung zones, persistent small b/l pleural effusion  -on 6L NC, titrate to maintain sp02 above 90-92%  -OOBTC, IS  -s/p solumedrol 125q6h x 3d (4/28--4/30  -40mg prednisone w/eventual out pt taper and PCP ppx    #HAP- s/p Cefdopoxime/Zosyn (4/22-4/27)  -non toxic, no leucocytosis  -continue to monitor     #COVID pneumonia- s/p Cefdopoxime/Zosyn (4/22-4/27), Plaquenil (4/21-4/25), Azithro (4/2104/25), Toci 4/23      GI:  -DM puree nectar (breakfast and lunch only)  -Protonix ppx      /Renal:  #Hypernatremia- likely 2/2 to poor PO intake (RESOLVED)      Endo:  #NIDDM  -ISS, Lantus 18u, Allopurinol    #Hypothyroidism- Synthroid, )       ID:  RICHARD    Hematology:  RICHARD    Other:  F- none  E-replete as needed  N- pureed nectar   C- FC  DVt ppx: lovenox     Access:    Dispo: MICU

## 2020-05-07 NOTE — PROGRESS NOTE ADULT - SUBJECTIVE AND OBJECTIVE BOX
**Incomplete Note**    INTERVAL HPI/OVERNIGHT EVENTS:  Patient was seen and examined at bedside. As per nurse and patient, no o/n events, patient resting comfortably. No complaints at this time. Patient denies: fever, chills, dizziness, weakness, HA, Changes in vision, CP, palpitations, SOB, cough, N/V/D/C, dysuria, changes in bowel movements, LE edema. ROS otherwise negative.    VITAL SIGNS:  T(F): 96 (05-07-20 @ 05:00)  HR: 76 (05-06-20 @ 17:56)  BP: 147/80 (05-06-20 @ 17:56)  RR: 20 (05-06-20 @ 17:56)  SpO2: 92% (05-06-20 @ 17:56)  Wt(kg): --      05-05-20 @ 07:01  -  05-06-20 @ 07:00  --------------------------------------------------------  IN: 0 mL / OUT: 1000 mL / NET: -1000 mL    05-06-20 @ 07:01  -  05-07-20 @ 06:58  --------------------------------------------------------  IN: 0 mL / OUT: 400 mL / NET: -400 mL        PHYSICAL EXAM:    Constitutional: WDWN resting comfortably in bed; NAD  HEENT: NC/AT, PERRL, EOMI, anicteric sclera, no nasal discharge; uvula midline, no oropharyngeal erythema or exudates; MMM  Neck: supple; no JVD or thyromegaly  Respiratory: CTA B/L; no W/R/R, no retractions  Cardiac: +S1/S2; RRR; no M/R/G; PMI non-displaced  Gastrointestinal: soft, NT/ND; no rebound or guarding; +BSx4  Genitourinary: normal external genitalia  Back: spine midline, no bony tenderness or step-offs; no CVAT B/L  Extremities: WWP, no clubbing or cyanosis; no peripheral edema  Musculoskeletal: NROM x4; no joint swelling, tenderness or erythema  Vascular: 2+ radial, DP/PT pulses B/L  Dermatologic: skin warm, dry and intact; no rashes, wounds, or scars  Lymphatic: no submandibular or cervical LAD  Neurologic: AAOx3; CNII-XII grossly intact; no focal deficits  Psychiatric: affect and characteristics of appearance, verbalizations, behaviors are appropriate, denies SI/HI/AH/VH    MEDICATIONS  (STANDING):  allopurinol 300 milliGRAM(s) Oral daily  aspirin  chewable 81 milliGRAM(s) Oral daily  atorvastatin 20 milliGRAM(s) Oral at bedtime  bisacodyl Suppository 10 milliGRAM(s) Rectal daily  cholecalciferol 1000 Unit(s) Oral every 24 hours  dextrose 5%. 1000 milliLiter(s) (50 mL/Hr) IV Continuous <Continuous>  dextrose 50% Injectable 12.5 Gram(s) IV Push once  dextrose 50% Injectable 25 Gram(s) IV Push once  dextrose 50% Injectable 25 Gram(s) IV Push once  DULoxetine 20 milliGRAM(s) Oral two times a day  enoxaparin Injectable 40 milliGRAM(s) SubCutaneous every 24 hours  famotidine    Tablet 20 milliGRAM(s) Oral daily  insulin glargine Injectable (LANTUS) 18 Unit(s) SubCutaneous at bedtime  insulin lispro (HumaLOG) corrective regimen sliding scale   SubCutaneous Before meals and at bedtime  levothyroxine 137 MICROGram(s) Oral every 24 hours  OLANZapine 5 milliGRAM(s) Oral at bedtime  polyethylene glycol 3350 17 Gram(s) Oral daily  predniSONE   Tablet 40 milliGRAM(s) Oral daily  senna 2 Tablet(s) Oral at bedtime  trimethoprim  160 mG/sulfamethoxazole 800 mG 1 Tablet(s) Oral <User Schedule>    MEDICATIONS  (PRN):  acetaminophen   Tablet .. 650 milliGRAM(s) Oral every 6 hours PRN Temp greater or equal to 38C (100.4F)  dextrose 40% Gel 15 Gram(s) Oral once PRN Blood Glucose LESS THAN 70 milliGRAM(s)/deciliter  glucagon  Injectable 1 milliGRAM(s) IntraMuscular once PRN Glucose LESS THAN 70 milligrams/deciliter      Allergies    No Known Allergies    Intolerances        LABS:                        15.5   6.80  )-----------( 156      ( 07 May 2020 06:40 )             46.9                 RADIOLOGY & ADDITIONAL TESTS:  Reviewed INTERVAL HPI/OVERNIGHT EVENTS:  Patient was seen and examined at bedside. As per nurse and patient, no o/n events, patient resting comfortably. No complaints at this time. Patient denies: fever, chills, dizziness, weakness, HA, Changes in vision, CP, palpitations, SOB, cough, N/V/D/C, dysuria, changes in bowel movements, LE edema. ROS otherwise negative.    VITAL SIGNS:  T(F): 96 (05-07-20 @ 05:00)  HR: 76 (05-06-20 @ 17:56)  BP: 147/80 (05-06-20 @ 17:56)  RR: 20 (05-06-20 @ 17:56)  SpO2: 92% (05-06-20 @ 17:56)  Wt(kg): --      05-05-20 @ 07:01  -  05-06-20 @ 07:00  --------------------------------------------------------  IN: 0 mL / OUT: 1000 mL / NET: -1000 mL    05-06-20 @ 07:01  -  05-07-20 @ 06:58  --------------------------------------------------------  IN: 0 mL / OUT: 400 mL / NET: -400 mL      PHYSICAL EXAM:    Constitutional: Elderly gentleman resting comfortably in bed; NAD on 6L   HEENT: NC/AT, PERRL, EOMI, anicteric sclera, no nasal discharge; uvula midline, no oropharyngeal erythema or exudates; MMM  Neck: supple; no JVD or thyromegaly  Respiratory: CTA B/L; no W/R/R, no retractions  Cardiac: +S1/S2; RRR; no M/R/G; PMI non-displaced  Gastrointestinal: soft, NT/ND; no rebound or guarding; +BSx4  Extremities: WWP, no clubbing or cyanosis; no peripheral edema  Musculoskeletal: NROM x4; no joint swelling, tenderness or erythema  Vascular: 2+ radial, DP/PT pulses B/L  Dermatologic: skin warm, dry and intact; no rashes, wounds, or scars  Lymphatic: no submandibular or cervical LAD  Neurologic: AAOx3; CNII-XII grossly intact; no focal deficits  Psychiatric: flat affect       MEDICATIONS  (STANDING):  allopurinol 300 milliGRAM(s) Oral daily  aspirin  chewable 81 milliGRAM(s) Oral daily  atorvastatin 20 milliGRAM(s) Oral at bedtime  bisacodyl Suppository 10 milliGRAM(s) Rectal daily  cholecalciferol 1000 Unit(s) Oral every 24 hours  dextrose 5%. 1000 milliLiter(s) (50 mL/Hr) IV Continuous <Continuous>  dextrose 50% Injectable 12.5 Gram(s) IV Push once  dextrose 50% Injectable 25 Gram(s) IV Push once  dextrose 50% Injectable 25 Gram(s) IV Push once  DULoxetine 20 milliGRAM(s) Oral two times a day  enoxaparin Injectable 40 milliGRAM(s) SubCutaneous every 24 hours  famotidine    Tablet 20 milliGRAM(s) Oral daily  insulin glargine Injectable (LANTUS) 18 Unit(s) SubCutaneous at bedtime  insulin lispro (HumaLOG) corrective regimen sliding scale   SubCutaneous Before meals and at bedtime  levothyroxine 137 MICROGram(s) Oral every 24 hours  OLANZapine 5 milliGRAM(s) Oral at bedtime  polyethylene glycol 3350 17 Gram(s) Oral daily  predniSONE   Tablet 40 milliGRAM(s) Oral daily  senna 2 Tablet(s) Oral at bedtime  trimethoprim  160 mG/sulfamethoxazole 800 mG 1 Tablet(s) Oral <User Schedule>    MEDICATIONS  (PRN):  acetaminophen   Tablet .. 650 milliGRAM(s) Oral every 6 hours PRN Temp greater or equal to 38C (100.4F)  dextrose 40% Gel 15 Gram(s) Oral once PRN Blood Glucose LESS THAN 70 milliGRAM(s)/deciliter  glucagon  Injectable 1 milliGRAM(s) IntraMuscular once PRN Glucose LESS THAN 70 milligrams/deciliter      Allergies    No Known Allergies    Intolerances        LABS:                        15.5   6.80  )-----------( 156      ( 07 May 2020 06:40 )             46.9                 RADIOLOGY & ADDITIONAL TESTS:  Reviewed

## 2020-05-08 LAB
24R-OH-CALCIDIOL SERPL-MCNC: 41.4 NG/ML — SIGNIFICANT CHANGE UP (ref 30–80)
ALBUMIN SERPL ELPH-MCNC: 2.9 G/DL — LOW (ref 3.3–5)
ALP SERPL-CCNC: 55 U/L — SIGNIFICANT CHANGE UP (ref 40–120)
ALT FLD-CCNC: 59 U/L — HIGH (ref 10–45)
ANION GAP SERPL CALC-SCNC: 10 MMOL/L — SIGNIFICANT CHANGE UP (ref 5–17)
AST SERPL-CCNC: 33 U/L — SIGNIFICANT CHANGE UP (ref 10–40)
BASOPHILS # BLD AUTO: 0 K/UL — SIGNIFICANT CHANGE UP (ref 0–0.2)
BASOPHILS NFR BLD AUTO: 0 % — SIGNIFICANT CHANGE UP (ref 0–2)
BILIRUB SERPL-MCNC: 0.7 MG/DL — SIGNIFICANT CHANGE UP (ref 0.2–1.2)
BUN SERPL-MCNC: 28 MG/DL — HIGH (ref 7–23)
CALCIUM SERPL-MCNC: 9.2 MG/DL — SIGNIFICANT CHANGE UP (ref 8.4–10.5)
CHLORIDE SERPL-SCNC: 104 MMOL/L — SIGNIFICANT CHANGE UP (ref 96–108)
CO2 SERPL-SCNC: 24 MMOL/L — SIGNIFICANT CHANGE UP (ref 22–31)
CREAT SERPL-MCNC: 1.2 MG/DL — SIGNIFICANT CHANGE UP (ref 0.5–1.3)
CRP SERPL-MCNC: 0.08 MG/DL — SIGNIFICANT CHANGE UP (ref 0–0.4)
D DIMER BLD IA.RAPID-MCNC: 272 NG/ML DDU — HIGH
EOSINOPHIL # BLD AUTO: 0.14 K/UL — SIGNIFICANT CHANGE UP (ref 0–0.5)
EOSINOPHIL NFR BLD AUTO: 2.2 % — SIGNIFICANT CHANGE UP (ref 0–6)
FERRITIN SERPL-MCNC: 1456 NG/ML — HIGH (ref 30–400)
GLUCOSE BLDC GLUCOMTR-MCNC: 194 MG/DL — HIGH (ref 70–99)
GLUCOSE BLDC GLUCOMTR-MCNC: 276 MG/DL — HIGH (ref 70–99)
GLUCOSE BLDC GLUCOMTR-MCNC: 300 MG/DL — HIGH (ref 70–99)
GLUCOSE SERPL-MCNC: 123 MG/DL — HIGH (ref 70–99)
HCT VFR BLD CALC: 46.5 % — SIGNIFICANT CHANGE UP (ref 39–50)
HGB BLD-MCNC: 15.5 G/DL — SIGNIFICANT CHANGE UP (ref 13–17)
IMM GRANULOCYTES NFR BLD AUTO: 0.2 % — SIGNIFICANT CHANGE UP (ref 0–1.5)
LYMPHOCYTES # BLD AUTO: 1.16 K/UL — SIGNIFICANT CHANGE UP (ref 1–3.3)
LYMPHOCYTES # BLD AUTO: 18 % — SIGNIFICANT CHANGE UP (ref 13–44)
MAGNESIUM SERPL-MCNC: 1.8 MG/DL — SIGNIFICANT CHANGE UP (ref 1.6–2.6)
MCHC RBC-ENTMCNC: 30.6 PG — SIGNIFICANT CHANGE UP (ref 27–34)
MCHC RBC-ENTMCNC: 33.3 GM/DL — SIGNIFICANT CHANGE UP (ref 32–36)
MCV RBC AUTO: 91.9 FL — SIGNIFICANT CHANGE UP (ref 80–100)
MONOCYTES # BLD AUTO: 0.63 K/UL — SIGNIFICANT CHANGE UP (ref 0–0.9)
MONOCYTES NFR BLD AUTO: 9.8 % — SIGNIFICANT CHANGE UP (ref 2–14)
NEUTROPHILS # BLD AUTO: 4.49 K/UL — SIGNIFICANT CHANGE UP (ref 1.8–7.4)
NEUTROPHILS NFR BLD AUTO: 69.8 % — SIGNIFICANT CHANGE UP (ref 43–77)
NRBC # BLD: 0 /100 WBCS — SIGNIFICANT CHANGE UP (ref 0–0)
PHOSPHATE SERPL-MCNC: 2.7 MG/DL — SIGNIFICANT CHANGE UP (ref 2.5–4.5)
PLATELET # BLD AUTO: 158 K/UL — SIGNIFICANT CHANGE UP (ref 150–400)
POTASSIUM SERPL-MCNC: 4 MMOL/L — SIGNIFICANT CHANGE UP (ref 3.5–5.3)
POTASSIUM SERPL-SCNC: 4 MMOL/L — SIGNIFICANT CHANGE UP (ref 3.5–5.3)
PROT SERPL-MCNC: 5.1 G/DL — LOW (ref 6–8.3)
RBC # BLD: 5.06 M/UL — SIGNIFICANT CHANGE UP (ref 4.2–5.8)
RBC # FLD: 14 % — SIGNIFICANT CHANGE UP (ref 10.3–14.5)
SODIUM SERPL-SCNC: 138 MMOL/L — SIGNIFICANT CHANGE UP (ref 135–145)
WBC # BLD: 6.43 K/UL — SIGNIFICANT CHANGE UP (ref 3.8–10.5)
WBC # FLD AUTO: 6.43 K/UL — SIGNIFICANT CHANGE UP (ref 3.8–10.5)

## 2020-05-08 RX ORDER — MAGNESIUM SULFATE 500 MG/ML
2 VIAL (ML) INJECTION ONCE
Refills: 0 | Status: COMPLETED | OUTPATIENT
Start: 2020-05-08 | End: 2020-05-08

## 2020-05-08 RX ADMIN — OLANZAPINE 5 MILLIGRAM(S): 15 TABLET, FILM COATED ORAL at 21:49

## 2020-05-08 RX ADMIN — DULOXETINE HYDROCHLORIDE 20 MILLIGRAM(S): 30 CAPSULE, DELAYED RELEASE ORAL at 21:49

## 2020-05-08 RX ADMIN — Medication 40 MILLIGRAM(S): at 06:32

## 2020-05-08 RX ADMIN — ATORVASTATIN CALCIUM 20 MILLIGRAM(S): 80 TABLET, FILM COATED ORAL at 21:48

## 2020-05-08 RX ADMIN — Medication 81 MILLIGRAM(S): at 09:24

## 2020-05-08 RX ADMIN — FAMOTIDINE 20 MILLIGRAM(S): 10 INJECTION INTRAVENOUS at 09:24

## 2020-05-08 RX ADMIN — Medication 6: at 18:14

## 2020-05-08 RX ADMIN — Medication 50 GRAM(S): at 09:24

## 2020-05-08 RX ADMIN — SENNA PLUS 2 TABLET(S): 8.6 TABLET ORAL at 21:48

## 2020-05-08 RX ADMIN — Medication 1 TABLET(S): at 10:59

## 2020-05-08 RX ADMIN — Medication 6: at 21:21

## 2020-05-08 RX ADMIN — DULOXETINE HYDROCHLORIDE 20 MILLIGRAM(S): 30 CAPSULE, DELAYED RELEASE ORAL at 06:32

## 2020-05-08 RX ADMIN — Medication 4: at 11:27

## 2020-05-08 RX ADMIN — ENOXAPARIN SODIUM 40 MILLIGRAM(S): 100 INJECTION SUBCUTANEOUS at 17:31

## 2020-05-08 RX ADMIN — INSULIN GLARGINE 18 UNIT(S): 100 INJECTION, SOLUTION SUBCUTANEOUS at 21:48

## 2020-05-08 RX ADMIN — Medication 300 MILLIGRAM(S): at 09:24

## 2020-05-08 RX ADMIN — Medication 137 MICROGRAM(S): at 06:32

## 2020-05-08 NOTE — PROGRESS NOTE ADULT - ASSESSMENT
75M PMH DM2 hypoxic respiratory failure 2/2 covid PNA on bipap. ccb hyponatremia, and hypernatremia now stepped down from 8Ea    Neuro:    #Hyperactive delirium- marked sundowning, improving on therapy  -c/w Zyprexa 5mg qd and Cymbalta 20mg BID    CVS:  RICHARD    #HLD  -c/wn ASA 81mg and  atorvastatin 20mg    Hemodynamics:  Perfusion: warm and well perfused  Volume status: euvolemic     Pulm:  #Acute hypoxemic Respiratory failure- requiring NRB then bipap  -CT chest 5/4 demonstrated interval resolution of ground-glass and linear opacities in RUL, however w/ new more extensive ground-glass and upper mid and lower lung zones, persistent small b/l pleural effusion  -on 6L NC, titrate to maintain sp02 above 90-92%  -OOBTC, IS  -s/p solumedrol 125q6h x 3d (4/28--4/30  -40mg prednisone w/eventual out pt taper and PCP ppx    #HAP- s/p Cefdopoxime/Zosyn (4/22-4/27)  -non toxic, no leucocytosis  -continue to monitor     #COVID pneumonia- s/p Cefdopoxime/Zosyn (4/22-4/27), Plaquenil (4/21-4/25), Azithro (4/2104/25), Toci 4/23      GI:  -DM puree nectar (breakfast and lunch only)  -Protonix ppx      /Renal:  #Hypernatremia- likely 2/2 to poor PO intake (RESOLVED)      Endo:  #NIDDM  -ISS, Lantus 18u, Allopurinol    #Hypothyroidism- Synthroid, )       ID:  RICHARD    Hematology:  RICHARD    Other:  F- none  E-replete as needed  N- pureed nectar   C- FC  DVt ppx: lovenox     Access:    Dispo: MICU 75M PMH DM2 hypoxic respiratory failure 2/2 covid PNA on bipap. ccb hyponatremia, and hypernatremia now stepped down from 8Ea    Neuro:    #Hyperactive delirium- marked sundowning, improving on therapy  -c/w Zyprexa 5mg qd and Cymbalta 20mg BID    CVS:  RICHARD    #HLD  -c/wn ASA 81mg and  atorvastatin 20mg    Hemodynamics:  Perfusion: warm and well perfused  Volume status: euvolemic     Pulm:  #Acute hypoxemic Respiratory failure- requiring NRB then bipap  -CT chest 5/4 demonstrated interval resolution of ground-glass and linear opacities in RUL, however w/ new more extensive ground-glass and upper mid and lower lung zones, persistent small b/l pleural effusion  -on RA, saturating 93% sp02   -OOBTC, IS  -s/p solumedrol 125q6h x 3d (4/28--4/30  -40mg prednisone w/eventual out pt taper and PCP ppx    #HAP- s/p Cefdopoxime/Zosyn (4/22-4/27)  -non toxic, no leucocytosis  -continue to monitor     #COVID pneumonia- s/p Cefdopoxime/Zosyn (4/22-4/27), Plaquenil (4/21-4/25), Azithro (4/2104/25), Toci 4/23      GI:  -DM puree nectar (breakfast and lunch only)  -Protonix ppx      /Renal:  #Hypernatremia- likely 2/2 to poor PO intake (RESOLVED)      Endo:  #NIDDM  -ISS, Lantus 18u, Allopurinol    #Hypothyroidism- Synthroid, )       ID:  RICHARD    Hematology:  RICHARD    Other:  F- none  E-replete as needed  N- pureed nectar   C- FC  DVt ppx: lovenox     Access:    Dispo: MICU

## 2020-05-08 NOTE — PROGRESS NOTE ADULT - SUBJECTIVE AND OBJECTIVE BOX
**Incomplete Note**    INTERVAL HPI/OVERNIGHT EVENTS:  Patient was seen and examined at bedside. As per nurse and patient, no o/n events, patient resting comfortably. No complaints at this time. Patient denies: fever, chills, dizziness, weakness, HA, Changes in vision, CP, palpitations, SOB, cough, N/V/D/C, dysuria, changes in bowel movements, LE edema. ROS otherwise negative.    VITAL SIGNS:  T(F): 96.6 (05-08-20 @ 05:00)  HR: 76 (05-08-20 @ 06:03)  BP: 118/72 (05-08-20 @ 06:03)  RR: 22 (05-08-20 @ 06:03)  SpO2: 91% (05-08-20 @ 06:03)  Wt(kg): --      05-06-20 @ 07:01  -  05-07-20 @ 07:00  --------------------------------------------------------  IN: 0 mL / OUT: 750 mL / NET: -750 mL    05-07-20 @ 07:01  -  05-08-20 @ 06:55  --------------------------------------------------------  IN: 237 mL / OUT: 1100 mL / NET: -863 mL        PHYSICAL EXAM:    Constitutional: WDWN resting comfortably in bed; NAD  HEENT: NC/AT, PERRL, EOMI, anicteric sclera, no nasal discharge; uvula midline, no oropharyngeal erythema or exudates; MMM  Neck: supple; no JVD or thyromegaly  Respiratory: CTA B/L; no W/R/R, no retractions  Cardiac: +S1/S2; RRR; no M/R/G; PMI non-displaced  Gastrointestinal: soft, NT/ND; no rebound or guarding; +BSx4  Genitourinary: normal external genitalia  Back: spine midline, no bony tenderness or step-offs; no CVAT B/L  Extremities: WWP, no clubbing or cyanosis; no peripheral edema  Musculoskeletal: NROM x4; no joint swelling, tenderness or erythema  Vascular: 2+ radial, DP/PT pulses B/L  Dermatologic: skin warm, dry and intact; no rashes, wounds, or scars  Lymphatic: no submandibular or cervical LAD  Neurologic: AAOx3; CNII-XII grossly intact; no focal deficits  Psychiatric: affect and characteristics of appearance, verbalizations, behaviors are appropriate, denies SI/HI/AH/VH    MEDICATIONS  (STANDING):  allopurinol 300 milliGRAM(s) Oral daily  aspirin  chewable 81 milliGRAM(s) Oral daily  atorvastatin 20 milliGRAM(s) Oral at bedtime  bisacodyl Suppository 10 milliGRAM(s) Rectal daily  cholecalciferol 1000 Unit(s) Oral every 24 hours  dextrose 5%. 1000 milliLiter(s) (50 mL/Hr) IV Continuous <Continuous>  dextrose 50% Injectable 12.5 Gram(s) IV Push once  dextrose 50% Injectable 25 Gram(s) IV Push once  dextrose 50% Injectable 25 Gram(s) IV Push once  DULoxetine 20 milliGRAM(s) Oral two times a day  enoxaparin Injectable 40 milliGRAM(s) SubCutaneous every 24 hours  famotidine    Tablet 20 milliGRAM(s) Oral daily  insulin glargine Injectable (LANTUS) 18 Unit(s) SubCutaneous at bedtime  insulin lispro (HumaLOG) corrective regimen sliding scale   SubCutaneous Before meals and at bedtime  levothyroxine 137 MICROGram(s) Oral every 24 hours  OLANZapine 5 milliGRAM(s) Oral at bedtime  polyethylene glycol 3350 17 Gram(s) Oral daily  predniSONE   Tablet 40 milliGRAM(s) Oral daily  senna 2 Tablet(s) Oral at bedtime  trimethoprim  160 mG/sulfamethoxazole 800 mG 1 Tablet(s) Oral <User Schedule>    MEDICATIONS  (PRN):  acetaminophen   Tablet .. 650 milliGRAM(s) Oral every 6 hours PRN Temp greater or equal to 38C (100.4F)  dextrose 40% Gel 15 Gram(s) Oral once PRN Blood Glucose LESS THAN 70 milliGRAM(s)/deciliter  glucagon  Injectable 1 milliGRAM(s) IntraMuscular once PRN Glucose LESS THAN 70 milligrams/deciliter      Allergies    No Known Allergies    Intolerances        LABS:                        15.5   6.43  )-----------( 158      ( 08 May 2020 06:05 )             46.5     05-08    138  |  104  |  28<H>  ----------------------------<  123<H>  4.0   |  24  |  1.20    Ca    9.2      08 May 2020 06:05  Phos  2.7     05-08  Mg     1.8     05-08    TPro  5.1<L>  /  Alb  2.9<L>  /  TBili  0.7  /  DBili  x   /  AST  33  /  ALT  59<H>  /  AlkPhos  55  05-08          RADIOLOGY & ADDITIONAL TESTS:  Reviewed **Incomplete Note**    Hospital Course: 74yo M w/ PMH of DMII, ?sleep apnea (on CPAP o/n outpatient) who initially presented 4/21 for subjective fever x 1 wk, found to be COVID+ and admitted to [facility] for viral PNA 2/2 COVID-9; subsequently stepped up to COVID tele 4/25 for acute hypoxic respiratory failure w/ increased WOB on 15L NRB & increasing lethargy. Of note, patient was saturating well on RA on admission, subsequently requiring 4L NC 4/24, 15L NRB 4/25, HFNC + NRB after transfer to ICU. Initiated CPAP o/n 4/26, with poor patient compliance necessitating sedation. Hospital course c/b hyponatremia, since resolved; head CT completed for recent hx mechanical fall prior to adm, neg for acute pathology. GOC d/w patient and wife 4/26 and he is to remain FULL CODE. Continued on Zosyn (4/24-4/27) for HAP coverage given increasing O2 requirements, leukocytosis, L mid-lung consolidation on CXR. Stepped up to COVID ICU 4/27 for increased monitoring and high risk of intubation. AOx3 at baseline, NPO saturating 92% on CPAP at time of transfer. Oxygen requirements have improved since transfer from CPAP -> NRB -> Venti mask -> NC, to currently RA. Tapering prednisone since 5/2. Sundowning improving w/Zyprexa and Cymbalta.    INTERVAL HPI/OVERNIGHT EVENTS:  Patient was seen and examined at bedside. As per nurse and patient, no o/n events, patient resting comfortably. No complaints at this time. Patient denies: fever, chills, dizziness, weakness, HA, Changes in vision, CP, palpitations, SOB, cough, N/V/D/C, dysuria, changes in bowel movements, LE edema. ROS otherwise negative.    VITAL SIGNS:  T(F): 96.6 (05-08-20 @ 05:00)  HR: 76 (05-08-20 @ 06:03)  BP: 118/72 (05-08-20 @ 06:03)  RR: 22 (05-08-20 @ 06:03)  SpO2: 91% (05-08-20 @ 06:03)  Wt(kg): --      05-06-20 @ 07:01  -  05-07-20 @ 07:00  --------------------------------------------------------  IN: 0 mL / OUT: 750 mL / NET: -750 mL    05-07-20 @ 07:01  -  05-08-20 @ 06:55  --------------------------------------------------------  IN: 237 mL / OUT: 1100 mL / NET: -863 mL        PHYSICAL EXAM:    Constitutional: WDWN resting comfortably in bed; NAD  HEENT: NC/AT, PERRL, EOMI, anicteric sclera, no nasal discharge; uvula midline, no oropharyngeal erythema or exudates; MMM  Neck: supple; no JVD or thyromegaly  Respiratory: CTA B/L; no W/R/R, no retractions  Cardiac: +S1/S2; RRR; no M/R/G; PMI non-displaced  Gastrointestinal: soft, NT/ND; no rebound or guarding; +BSx4  Genitourinary: normal external genitalia  Back: spine midline, no bony tenderness or step-offs; no CVAT B/L  Extremities: WWP, no clubbing or cyanosis; no peripheral edema  Musculoskeletal: NROM x4; no joint swelling, tenderness or erythema  Vascular: 2+ radial, DP/PT pulses B/L  Dermatologic: skin warm, dry and intact; no rashes, wounds, or scars  Lymphatic: no submandibular or cervical LAD  Neurologic: AAOx3; CNII-XII grossly intact; no focal deficits  Psychiatric: affect and characteristics of appearance, verbalizations, behaviors are appropriate, denies SI/HI/AH/VH    MEDICATIONS  (STANDING):  allopurinol 300 milliGRAM(s) Oral daily  aspirin  chewable 81 milliGRAM(s) Oral daily  atorvastatin 20 milliGRAM(s) Oral at bedtime  bisacodyl Suppository 10 milliGRAM(s) Rectal daily  cholecalciferol 1000 Unit(s) Oral every 24 hours  dextrose 5%. 1000 milliLiter(s) (50 mL/Hr) IV Continuous <Continuous>  dextrose 50% Injectable 12.5 Gram(s) IV Push once  dextrose 50% Injectable 25 Gram(s) IV Push once  dextrose 50% Injectable 25 Gram(s) IV Push once  DULoxetine 20 milliGRAM(s) Oral two times a day  enoxaparin Injectable 40 milliGRAM(s) SubCutaneous every 24 hours  famotidine    Tablet 20 milliGRAM(s) Oral daily  insulin glargine Injectable (LANTUS) 18 Unit(s) SubCutaneous at bedtime  insulin lispro (HumaLOG) corrective regimen sliding scale   SubCutaneous Before meals and at bedtime  levothyroxine 137 MICROGram(s) Oral every 24 hours  OLANZapine 5 milliGRAM(s) Oral at bedtime  polyethylene glycol 3350 17 Gram(s) Oral daily  predniSONE   Tablet 40 milliGRAM(s) Oral daily  senna 2 Tablet(s) Oral at bedtime  trimethoprim  160 mG/sulfamethoxazole 800 mG 1 Tablet(s) Oral <User Schedule>    MEDICATIONS  (PRN):  acetaminophen   Tablet .. 650 milliGRAM(s) Oral every 6 hours PRN Temp greater or equal to 38C (100.4F)  dextrose 40% Gel 15 Gram(s) Oral once PRN Blood Glucose LESS THAN 70 milliGRAM(s)/deciliter  glucagon  Injectable 1 milliGRAM(s) IntraMuscular once PRN Glucose LESS THAN 70 milligrams/deciliter      Allergies    No Known Allergies    Intolerances        LABS:                        15.5   6.43  )-----------( 158      ( 08 May 2020 06:05 )             46.5     05-08    138  |  104  |  28<H>  ----------------------------<  123<H>  4.0   |  24  |  1.20    Ca    9.2      08 May 2020 06:05  Phos  2.7     05-08  Mg     1.8     05-08    TPro  5.1<L>  /  Alb  2.9<L>  /  TBili  0.7  /  DBili  x   /  AST  33  /  ALT  59<H>  /  AlkPhos  55  05-08          RADIOLOGY & ADDITIONAL TESTS:  Reviewed Transfer from [facility] to [facility]  Hospital Course: 76yo M w/ PMH of DMII, ?sleep apnea (on CPAP o/n outpatient) who initially presented 4/21 for subjective fever x 1 wk, found to be COVID+ and admitted to [facility] for viral PNA 2/2 COVID-9; subsequently stepped up to [facility] tele 4/25 for acute hypoxic respiratory failure w/ increased WOB on 15L NRB & increasing lethargy. Of note, patient was saturating well on RA on admission, subsequently requiring 4L NC 4/24, 15L NRB 4/25, HFNC + NRB after transfer to ICU. Initiated CPAP o/n 4/26, with poor patient compliance necessitating sedation. Hospital course c/b hyponatremia, since resolved; head CT completed for recent hx mechanical fall prior to adm, neg for acute pathology. GOC d/w patient and wife 4/26 and he is to remain FULL CODE. Continued on Zosyn (4/24-4/27) for HAP coverage given increasing O2 requirements, leukocytosis, L mid-lung consolidation on CXR. Stepped up to [facility] ICU 4/27 for increased monitoring and high risk of intubation. AOx3 at baseline, NPO saturating 92% on CPAP at time of transfer. Oxygen requirements have improved since transfer from CPAP -> NRB -> Venti mask -> NC, to currently RA. Tapering prednisone since 5/2. Sundowning improving w/Zyprexa and Cymbalta. Pt is deemed medically stable for stepdown to [facility].    INTERVAL HPI/OVERNIGHT EVENTS:  Patient was seen and examined at bedside. As per nurse and patient, no o/n events, patient resting comfortably. No complaints at this time. Patient denies: fever, chills, dizziness, weakness, HA, Changes in vision, CP, palpitations, SOB, cough, N/V/D/C, dysuria, changes in bowel movements, LE edema. ROS otherwise negative.    VITAL SIGNS:  T(F): 96.6 (05-08-20 @ 05:00)  HR: 76 (05-08-20 @ 06:03)  BP: 118/72 (05-08-20 @ 06:03)  RR: 22 (05-08-20 @ 06:03)  SpO2: 91% (05-08-20 @ 06:03)  Wt(kg): --      05-06-20 @ 07:01  -  05-07-20 @ 07:00  --------------------------------------------------------  IN: 0 mL / OUT: 750 mL / NET: -750 mL    05-07-20 @ 07:01  -  05-08-20 @ 06:55  --------------------------------------------------------  IN: 237 mL / OUT: 1100 mL / NET: -863 mL        PHYSICAL EXAM:    Constitutional: Elderly gentleman resting comfortably in bed; NAD on RA  HEENT: NC/AT, PERRL, EOMI, anicteric sclera, no nasal discharge; uvula midline, no oropharyngeal erythema or exudates; MMM  Neck: supple; no JVD or thyromegaly  Respiratory: CTA B/L; no W/R/R, no retractions  Cardiac: +S1/S2; RRR; no M/R/G; PMI non-displaced  Gastrointestinal: soft, NT/ND; no rebound or guarding; +BSx4  Extremities: WWP, no clubbing or cyanosis; no peripheral edema  Musculoskeletal: NROM x4; no joint swelling, tenderness or erythema  Vascular: 2+ radial, DP/PT pulses B/L  Dermatologic: skin warm, dry and intact; no rashes, wounds, or scars  Lymphatic: no submandibular or cervical LAD  Neurologic: AAOx3; CNII-XII grossly intact; no focal deficits  Psychiatric: flat affect     MEDICATIONS  (STANDING):  allopurinol 300 milliGRAM(s) Oral daily  aspirin  chewable 81 milliGRAM(s) Oral daily  atorvastatin 20 milliGRAM(s) Oral at bedtime  bisacodyl Suppository 10 milliGRAM(s) Rectal daily  cholecalciferol 1000 Unit(s) Oral every 24 hours  dextrose 5%. 1000 milliLiter(s) (50 mL/Hr) IV Continuous <Continuous>  dextrose 50% Injectable 12.5 Gram(s) IV Push once  dextrose 50% Injectable 25 Gram(s) IV Push once  dextrose 50% Injectable 25 Gram(s) IV Push once  DULoxetine 20 milliGRAM(s) Oral two times a day  enoxaparin Injectable 40 milliGRAM(s) SubCutaneous every 24 hours  famotidine    Tablet 20 milliGRAM(s) Oral daily  insulin glargine Injectable (LANTUS) 18 Unit(s) SubCutaneous at bedtime  insulin lispro (HumaLOG) corrective regimen sliding scale   SubCutaneous Before meals and at bedtime  levothyroxine 137 MICROGram(s) Oral every 24 hours  OLANZapine 5 milliGRAM(s) Oral at bedtime  polyethylene glycol 3350 17 Gram(s) Oral daily  predniSONE   Tablet 40 milliGRAM(s) Oral daily  senna 2 Tablet(s) Oral at bedtime  trimethoprim  160 mG/sulfamethoxazole 800 mG 1 Tablet(s) Oral <User Schedule>    MEDICATIONS  (PRN):  acetaminophen   Tablet .. 650 milliGRAM(s) Oral every 6 hours PRN Temp greater or equal to 38C (100.4F)  dextrose 40% Gel 15 Gram(s) Oral once PRN Blood Glucose LESS THAN 70 milliGRAM(s)/deciliter  glucagon  Injectable 1 milliGRAM(s) IntraMuscular once PRN Glucose LESS THAN 70 milligrams/deciliter      Allergies    No Known Allergies    Intolerances        LABS:                        15.5   6.43  )-----------( 158      ( 08 May 2020 06:05 )             46.5     05-08    138  |  104  |  28<H>  ----------------------------<  123<H>  4.0   |  24  |  1.20    Ca    9.2      08 May 2020 06:05  Phos  2.7     05-08  Mg     1.8     05-08    TPro  5.1<L>  /  Alb  2.9<L>  /  TBili  0.7  /  DBili  x   /  AST  33  /  ALT  59<H>  /  AlkPhos  55  05-08          RADIOLOGY & ADDITIONAL TESTS:  Reviewed

## 2020-05-09 ENCOUNTER — TRANSCRIPTION ENCOUNTER (OUTPATIENT)
Age: 75
End: 2020-05-09

## 2020-05-09 DIAGNOSIS — E11.69 TYPE 2 DIABETES MELLITUS WITH OTHER SPECIFIED COMPLICATION: ICD-10-CM

## 2020-05-09 DIAGNOSIS — J18.9 PNEUMONIA, UNSPECIFIED ORGANISM: ICD-10-CM

## 2020-05-09 DIAGNOSIS — J96.01 ACUTE RESPIRATORY FAILURE WITH HYPOXIA: ICD-10-CM

## 2020-05-09 DIAGNOSIS — E03.9 HYPOTHYROIDISM, UNSPECIFIED: ICD-10-CM

## 2020-05-09 DIAGNOSIS — Z29.9 ENCOUNTER FOR PROPHYLACTIC MEASURES, UNSPECIFIED: ICD-10-CM

## 2020-05-09 DIAGNOSIS — E78.5 HYPERLIPIDEMIA, UNSPECIFIED: ICD-10-CM

## 2020-05-09 LAB
ALBUMIN SERPL ELPH-MCNC: 3 G/DL — LOW (ref 3.3–5)
ALP SERPL-CCNC: 58 U/L — SIGNIFICANT CHANGE UP (ref 40–120)
ALT FLD-CCNC: 58 U/L — HIGH (ref 10–45)
ANION GAP SERPL CALC-SCNC: 12 MMOL/L — SIGNIFICANT CHANGE UP (ref 5–17)
AST SERPL-CCNC: 32 U/L — SIGNIFICANT CHANGE UP (ref 10–40)
BASOPHILS # BLD AUTO: 0.01 K/UL — SIGNIFICANT CHANGE UP (ref 0–0.2)
BASOPHILS NFR BLD AUTO: 0.1 % — SIGNIFICANT CHANGE UP (ref 0–2)
BILIRUB SERPL-MCNC: 0.7 MG/DL — SIGNIFICANT CHANGE UP (ref 0.2–1.2)
BUN SERPL-MCNC: 25 MG/DL — HIGH (ref 7–23)
CALCIUM SERPL-MCNC: 8.8 MG/DL — SIGNIFICANT CHANGE UP (ref 8.4–10.5)
CHLORIDE SERPL-SCNC: 104 MMOL/L — SIGNIFICANT CHANGE UP (ref 96–108)
CO2 SERPL-SCNC: 22 MMOL/L — SIGNIFICANT CHANGE UP (ref 22–31)
CREAT SERPL-MCNC: 1.22 MG/DL — SIGNIFICANT CHANGE UP (ref 0.5–1.3)
CRP SERPL-MCNC: 0.07 MG/DL — SIGNIFICANT CHANGE UP (ref 0–0.4)
D DIMER BLD IA.RAPID-MCNC: 168 NG/ML DDU — SIGNIFICANT CHANGE UP
EOSINOPHIL # BLD AUTO: 0.1 K/UL — SIGNIFICANT CHANGE UP (ref 0–0.5)
EOSINOPHIL NFR BLD AUTO: 1.2 % — SIGNIFICANT CHANGE UP (ref 0–6)
FERRITIN SERPL-MCNC: 1223 NG/ML — HIGH (ref 30–400)
GLUCOSE BLDC GLUCOMTR-MCNC: 202 MG/DL — HIGH (ref 70–99)
GLUCOSE BLDC GLUCOMTR-MCNC: 228 MG/DL — HIGH (ref 70–99)
GLUCOSE BLDC GLUCOMTR-MCNC: 235 MG/DL — HIGH (ref 70–99)
GLUCOSE BLDC GLUCOMTR-MCNC: 85 MG/DL — SIGNIFICANT CHANGE UP (ref 70–99)
GLUCOSE SERPL-MCNC: 68 MG/DL — LOW (ref 70–99)
HCT VFR BLD CALC: 44.6 % — SIGNIFICANT CHANGE UP (ref 39–50)
HGB BLD-MCNC: 15 G/DL — SIGNIFICANT CHANGE UP (ref 13–17)
IMM GRANULOCYTES NFR BLD AUTO: 0.4 % — SIGNIFICANT CHANGE UP (ref 0–1.5)
LYMPHOCYTES # BLD AUTO: 1.4 K/UL — SIGNIFICANT CHANGE UP (ref 1–3.3)
LYMPHOCYTES # BLD AUTO: 16.9 % — SIGNIFICANT CHANGE UP (ref 13–44)
MAGNESIUM SERPL-MCNC: 1.8 MG/DL — SIGNIFICANT CHANGE UP (ref 1.6–2.6)
MCHC RBC-ENTMCNC: 31 PG — SIGNIFICANT CHANGE UP (ref 27–34)
MCHC RBC-ENTMCNC: 33.6 GM/DL — SIGNIFICANT CHANGE UP (ref 32–36)
MCV RBC AUTO: 92.1 FL — SIGNIFICANT CHANGE UP (ref 80–100)
MONOCYTES # BLD AUTO: 1.03 K/UL — HIGH (ref 0–0.9)
MONOCYTES NFR BLD AUTO: 12.5 % — SIGNIFICANT CHANGE UP (ref 2–14)
NEUTROPHILS # BLD AUTO: 5.7 K/UL — SIGNIFICANT CHANGE UP (ref 1.8–7.4)
NEUTROPHILS NFR BLD AUTO: 68.9 % — SIGNIFICANT CHANGE UP (ref 43–77)
NRBC # BLD: 0 /100 WBCS — SIGNIFICANT CHANGE UP (ref 0–0)
PHOSPHATE SERPL-MCNC: 3.1 MG/DL — SIGNIFICANT CHANGE UP (ref 2.5–4.5)
PLATELET # BLD AUTO: 149 K/UL — LOW (ref 150–400)
POTASSIUM SERPL-MCNC: 3.6 MMOL/L — SIGNIFICANT CHANGE UP (ref 3.5–5.3)
POTASSIUM SERPL-SCNC: 3.6 MMOL/L — SIGNIFICANT CHANGE UP (ref 3.5–5.3)
PROT SERPL-MCNC: 5 G/DL — LOW (ref 6–8.3)
RBC # BLD: 4.84 M/UL — SIGNIFICANT CHANGE UP (ref 4.2–5.8)
RBC # FLD: 14.1 % — SIGNIFICANT CHANGE UP (ref 10.3–14.5)
SODIUM SERPL-SCNC: 138 MMOL/L — SIGNIFICANT CHANGE UP (ref 135–145)
WBC # BLD: 8.27 K/UL — SIGNIFICANT CHANGE UP (ref 3.8–10.5)
WBC # FLD AUTO: 8.27 K/UL — SIGNIFICANT CHANGE UP (ref 3.8–10.5)

## 2020-05-09 PROCEDURE — 99233 SBSQ HOSP IP/OBS HIGH 50: CPT | Mod: CS,GC

## 2020-05-09 RX ORDER — OLANZAPINE 15 MG/1
2.5 TABLET, FILM COATED ORAL AT BEDTIME
Refills: 0 | Status: DISCONTINUED | OUTPATIENT
Start: 2020-05-09 | End: 2020-05-11

## 2020-05-09 RX ORDER — MAGNESIUM SULFATE 500 MG/ML
1 VIAL (ML) INJECTION ONCE
Refills: 0 | Status: COMPLETED | OUTPATIENT
Start: 2020-05-09 | End: 2020-05-09

## 2020-05-09 RX ORDER — POTASSIUM CHLORIDE 20 MEQ
20 PACKET (EA) ORAL ONCE
Refills: 0 | Status: COMPLETED | OUTPATIENT
Start: 2020-05-09 | End: 2020-05-09

## 2020-05-09 RX ORDER — INSULIN LISPRO 100/ML
3 VIAL (ML) SUBCUTANEOUS
Refills: 0 | Status: DISCONTINUED | OUTPATIENT
Start: 2020-05-09 | End: 2020-05-10

## 2020-05-09 RX ORDER — INSULIN GLARGINE 100 [IU]/ML
14 INJECTION, SOLUTION SUBCUTANEOUS AT BEDTIME
Refills: 0 | Status: DISCONTINUED | OUTPATIENT
Start: 2020-05-09 | End: 2020-05-10

## 2020-05-09 RX ADMIN — Medication 81 MILLIGRAM(S): at 11:22

## 2020-05-09 RX ADMIN — POLYETHYLENE GLYCOL 3350 17 GRAM(S): 17 POWDER, FOR SOLUTION ORAL at 11:22

## 2020-05-09 RX ADMIN — Medication 4: at 22:24

## 2020-05-09 RX ADMIN — Medication 20 MILLIEQUIVALENT(S): at 18:17

## 2020-05-09 RX ADMIN — Medication 300 MILLIGRAM(S): at 11:22

## 2020-05-09 RX ADMIN — Medication 4: at 12:05

## 2020-05-09 RX ADMIN — DULOXETINE HYDROCHLORIDE 20 MILLIGRAM(S): 30 CAPSULE, DELAYED RELEASE ORAL at 05:46

## 2020-05-09 RX ADMIN — ENOXAPARIN SODIUM 40 MILLIGRAM(S): 100 INJECTION SUBCUTANEOUS at 17:25

## 2020-05-09 RX ADMIN — INSULIN GLARGINE 14 UNIT(S): 100 INJECTION, SOLUTION SUBCUTANEOUS at 22:23

## 2020-05-09 RX ADMIN — Medication 3 UNIT(S): at 17:22

## 2020-05-09 RX ADMIN — DULOXETINE HYDROCHLORIDE 20 MILLIGRAM(S): 30 CAPSULE, DELAYED RELEASE ORAL at 17:26

## 2020-05-09 RX ADMIN — Medication 10 MILLIGRAM(S): at 11:22

## 2020-05-09 RX ADMIN — Medication 40 MILLIGRAM(S): at 05:46

## 2020-05-09 RX ADMIN — ATORVASTATIN CALCIUM 20 MILLIGRAM(S): 80 TABLET, FILM COATED ORAL at 22:22

## 2020-05-09 RX ADMIN — Medication 100 GRAM(S): at 18:17

## 2020-05-09 RX ADMIN — Medication 1000 UNIT(S): at 17:26

## 2020-05-09 RX ADMIN — SENNA PLUS 2 TABLET(S): 8.6 TABLET ORAL at 22:23

## 2020-05-09 RX ADMIN — Medication 4: at 17:22

## 2020-05-09 RX ADMIN — OLANZAPINE 2.5 MILLIGRAM(S): 15 TABLET, FILM COATED ORAL at 22:23

## 2020-05-09 RX ADMIN — Medication 137 MICROGRAM(S): at 05:46

## 2020-05-09 RX ADMIN — FAMOTIDINE 20 MILLIGRAM(S): 10 INJECTION INTRAVENOUS at 11:22

## 2020-05-09 NOTE — PROGRESS NOTE ADULT - ASSESSMENT
75M PMH DM2 admitted for hypoxic respiratory failure 2/2 covid PNA on bipap. ccb hyponatremia, and hypernatremia now stepped down from [facility]    Neuro:    #Hyperactive delirium- marked sundowning, improving on therapy  -c/w Zyprexa 5mg qd and Cymbalta 20mg BID    CVS:  RICHARD    #HLD  -c/wn ASA 81mg and  atorvastatin 20mg    Hemodynamics:  Perfusion: warm and well perfused  Volume status: euvolemic     Pulm:  #Acute hypoxemic Respiratory failure- requiring NRB then bipap  -CT chest 5/4 demonstrated interval resolution of ground-glass and linear opacities in RUL, however w/ new more extensive ground-glass and upper mid and lower lung zones, persistent small b/l pleural effusion  -on RA, saturating 93% sp02   -OOBTC, IS  -s/p solumedrol 125q6h x 3d (4/28--4/30  -40mg prednisone w/eventual out pt taper and PCP ppx    #HAP- s/p Cefdopoxime/Zosyn (4/22-4/27)  -non toxic, no leucocytosis  -continue to monitor     #COVID pneumonia- s/p Cefdopoxime/Zosyn (4/22-4/27), Plaquenil (4/21-4/25), Azithro (4/2104/25), Toci 4/23      GI:  -DM puree nectar (breakfast and lunch only)  -Protonix ppx      /Renal:  #Hypernatremia- likely 2/2 to poor PO intake (RESOLVED)      Endo:  #NIDDM  -ISS, Lantus 18u, Allopurinol    #Hypothyroidism- Synthroid, )       ID:  RICHARD    Hematology:  RICHARD    Other:  F- none  E-replete as needed  N- pureed nectar   C- FC  DVt ppx: lovenox     Access:    Dispo: MICU 75M PMH DM2 admitted for hypoxic respiratory failure 2/2 covid PNA.     Neuro:        CVS:  RICHARD    #HLD      Hemodynamics:  Perfusion: warm and well perfused  Volume status: euvolemic     Pulm:  #Acute hypoxemic Respiratory failure- requiring NRB then bipap  -CT chest 5/4 demonstrated interval resolution of ground-glass and linear opacities in RUL, however w/ new more extensive ground-glass and upper mid and lower lung zones, persistent small b/l pleural effusion  -on RA, saturating 93% sp02   -OOBTC, IS  -s/p solumedrol 125q6h x 3d (4/28--4/30  -40mg prednisone w/eventual out pt taper and PCP ppx    #HAP- s/p Cefdopoxime/Zosyn (4/22-4/27)  -non toxic, no leucocytosis  -continue to monitor     #COVID pneumonia- s/p Cefdopoxime/Zosyn (4/22-4/27), Plaquenil (4/21-4/25), Azithro (4/2104/25), Toci 4/23      GI:  -DM puree nectar (breakfast and lunch only)  -Protonix ppx      /Renal:  #Hypernatremia- likely 2/2 to poor PO intake (RESOLVED)      Endo:  #NIDDM  -ISS, Lantus 18u, Allopurinol    #Hypothyroidism- Synthroid, )       ID:  RICHARD    Hematology:  RICHARD    Other:  F- none  E-replete as needed  N- pureed nectar   C- FC  DVt ppx: lovenox     Access:    Dispo: MICU 75M PMH DM2 admitted for hypoxic respiratory failure 2/2 covid PNA.

## 2020-05-09 NOTE — PROGRESS NOTE ADULT - ATTENDING COMMENTS
75M w DM on insulin, HTN, HLD, Gout, Hypothyroidism who p/w fevers and falls found to have COVID pna (s/p HCQ/Azithro/Steroids, Toci x1 4/23) course c/b ATN now improved and encephalopathy thought to be sundowning also improving, Transferred to [facility] now on RA.  Pt asking when can diet be advanced and timeline for going home. Otherwise hoping to be more active. Denies chest pain, dyspnea, N/V/Abd pain. Exam shows male in NAD, RRR, no mumurs, MMM, CTAB wo wheezing/ronchi/rales, soft abd wo tenderness, alert to self/LHH/mm/yyyy, strength 4/5 in BLE; 5/5 in BUE, CN II-XI Grossly intact.  Overall pt improving  #COVID pna - on room air  #DM - pt previously on 28u toujeo and 5u AC shoft acting w metformin  #Encephalopathy - multifactorial from acute illness/hospitalization vs COVID. Appears significantly improved  #HTN - at target. Home agents have been held  --Discuss w pulmonary regarding steroid taper duration  --Decrease basal insulin; increase premeal to 4u AC (12-13u of SSI/24h) and in setting of elevated daytime BGs  --Decrease zyprexa to 2.5 HS; c/w home cymbalta  --Advance diet  --Continued working w PT  Dispo: Home w home PT (assessment on 5/7) pending establishment of home services, continued stability of mental status, - anticipate 24-48h  F/U: Pulmonary, ppx xarelto,

## 2020-05-09 NOTE — PROGRESS NOTE ADULT - SUBJECTIVE AND OBJECTIVE BOX
INTERVAL HPI/OVERNIGHT EVENTS: RICHARD    SUBJECTIVE: Patient seen and examined at bedside. Pt would like to know when he's being released. Has good appetite. No fever, chills, SOB, CP, palpitations, n/v or diarrhea.      OBJECTIVE:    VITAL SIGNS:  ICU Vital Signs Last 24 Hrs  T(C): 36.6 (09 May 2020 06:41), Max: 36.6 (08 May 2020 21:03)  T(F): 97.8 (09 May 2020 06:41), Max: 97.8 (08 May 2020 21:03)  HR: 76 (09 May 2020 06:41) (72 - 76)  BP: 123/82 (09 May 2020 06:41) (121/77 - 123/82)  BP(mean): --  ABP: --  ABP(mean): --  RR: 19 (09 May 2020 06:41) (19 - 20)  SpO2: 93% (09 May 2020 06:41) (92% - 93%)        05-08 @ 07:01  -  05-09 @ 07:00  --------------------------------------------------------  IN: 337 mL / OUT: 575 mL / NET: -238 mL      CAPILLARY BLOOD GLUCOSE      POCT Blood Glucose.: 228 mg/dL (09 May 2020 11:59)      PHYSICAL EXAM:    General: NAD  HEENT: NC/AT; PERRL, clear conjunctiva  Neck: supple  Respiratory: CTA b/l  Cardiovascular: +S1/S2; RRR  Abdomen: soft, NT/ND; +BS x4  Extremities: WWP, 2+ peripheral pulses b/l; no LE edema  Skin: normal color and turgor; no rash  Neurological: AAOx3, no focal deficits    MEDICATIONS:  MEDICATIONS  (STANDING):  allopurinol 300 milliGRAM(s) Oral daily  aspirin  chewable 81 milliGRAM(s) Oral daily  atorvastatin 20 milliGRAM(s) Oral at bedtime  bisacodyl Suppository 10 milliGRAM(s) Rectal daily  cholecalciferol 1000 Unit(s) Oral every 24 hours  dextrose 5%. 1000 milliLiter(s) (50 mL/Hr) IV Continuous <Continuous>  dextrose 50% Injectable 12.5 Gram(s) IV Push once  dextrose 50% Injectable 25 Gram(s) IV Push once  dextrose 50% Injectable 25 Gram(s) IV Push once  DULoxetine 20 milliGRAM(s) Oral two times a day  enoxaparin Injectable 40 milliGRAM(s) SubCutaneous every 24 hours  famotidine    Tablet 20 milliGRAM(s) Oral daily  insulin glargine Injectable (LANTUS) 14 Unit(s) SubCutaneous at bedtime  insulin lispro (HumaLOG) corrective regimen sliding scale   SubCutaneous Before meals and at bedtime  levothyroxine 137 MICROGram(s) Oral every 24 hours  OLANZapine 5 milliGRAM(s) Oral at bedtime  polyethylene glycol 3350 17 Gram(s) Oral daily  predniSONE   Tablet 40 milliGRAM(s) Oral daily  senna 2 Tablet(s) Oral at bedtime  trimethoprim  160 mG/sulfamethoxazole 800 mG 1 Tablet(s) Oral <User Schedule>    MEDICATIONS  (PRN):  acetaminophen   Tablet .. 650 milliGRAM(s) Oral every 6 hours PRN Temp greater or equal to 38C (100.4F)  dextrose 40% Gel 15 Gram(s) Oral once PRN Blood Glucose LESS THAN 70 milliGRAM(s)/deciliter  glucagon  Injectable 1 milliGRAM(s) IntraMuscular once PRN Glucose LESS THAN 70 milligrams/deciliter      ALLERGIES:  Allergies    No Known Allergies    Intolerances        LABS:                        15.0   8.27  )-----------( 149      ( 09 May 2020 07:20 )             44.6     05-09    138  |  104  |  25<H>  ----------------------------<  68<L>  3.6   |  22  |  1.22    Ca    8.8      09 May 2020 07:20  Phos  3.1     05-09  Mg     1.8     05-09    TPro  5.0<L>  /  Alb  3.0<L>  /  TBili  0.7  /  DBili  x   /  AST  32  /  ALT  58<H>  /  AlkPhos  58  05-09          RADIOLOGY & ADDITIONAL TESTS: Reviewed.

## 2020-05-09 NOTE — PROGRESS NOTE ADULT - PROBLEM SELECTOR PLAN 5
-c/w synthroid 137qd ([facility]) -hypoglycemic 68, decreased lantus 18 to 14  -added humalog 3u TID premeal  -c/w ISS

## 2020-05-09 NOTE — DISCHARGE NOTE NURSING/CASE MANAGEMENT/SOCIAL WORK - PATIENT PORTAL LINK FT
You can access the FollowMyHealth Patient Portal offered by [facility] by registering at the following website: [email or web address]. By joining [employer]’s FollowMyHealth portal, you will also be able to view your health information using other applications (apps) compatible with our system.

## 2020-05-09 NOTE — PROGRESS NOTE ADULT - PROBLEM SELECTOR PROBLEM 5
Hypothyroidism, unspecified type Type 2 diabetes mellitus with other specified complication, unspecified whether long term insulin use

## 2020-05-09 NOTE — PROGRESS NOTE ADULT - PROBLEM SELECTOR PLAN 1
CT chest 5/4 demonstrated interval resolution of ground-glass and linear opacities in RUL, however w/ new more extensive ground-glass and upper mid and lower lung zones, persistent small b/l pleural effusion  -on RA, saturating 93% sp02   -OOBTC, IS  -s/p solumedrol 125q6h x 3d (4/28--4/30  -c/w 40mg prednisone: slow taper per pulm  -c/w bactrim for PCP ppx -CT chest 5/4 demonstrated interval resolution of ground-glass and linear opacities in RUL, however w/ new more extensive ground-glass and upper mid and lower lung zones, persistent small b/l pleural effusion  -on RA, saturating 93% sp02   -OOBTC, IS  -s/p solumedrol 125q6h x 3d (4/28--4/30  -tapered from 40mg prednisone to 30mg on 5/9: slow taper per pulm  -c/w bactrim for PCP ppx

## 2020-05-09 NOTE — PROGRESS NOTE ADULT - PROBLEM SELECTOR PLAN 3
-Hyperactive delirium- marked sundowning, improving on therapy  -c/w Zyprexa 5mg qd and Cymbalta 20mg BID -Hyperactive delirium- marked sundowning, improving on therapy  -decreased Zyprexa 2.5mg on 5/9   -c/w Cymbalta 20mg BID

## 2020-05-10 ENCOUNTER — TRANSCRIPTION ENCOUNTER (OUTPATIENT)
Age: 75
End: 2020-05-10

## 2020-05-10 DIAGNOSIS — Z76.89 PERSONS ENCOUNTERING HEALTH SERVICES IN OTHER SPECIFIED CIRCUMSTANCES: ICD-10-CM

## 2020-05-10 LAB
GLUCOSE BLDC GLUCOMTR-MCNC: 106 MG/DL — HIGH (ref 70–99)
GLUCOSE BLDC GLUCOMTR-MCNC: 112 MG/DL — HIGH (ref 70–99)
GLUCOSE BLDC GLUCOMTR-MCNC: 150 MG/DL — HIGH (ref 70–99)
GLUCOSE BLDC GLUCOMTR-MCNC: 206 MG/DL — HIGH (ref 70–99)
GLUCOSE BLDC GLUCOMTR-MCNC: 67 MG/DL — LOW (ref 70–99)

## 2020-05-10 PROCEDURE — 99232 SBSQ HOSP IP/OBS MODERATE 35: CPT | Mod: CS,GC

## 2020-05-10 RX ORDER — INSULIN GLARGINE 100 [IU]/ML
16 INJECTION, SOLUTION SUBCUTANEOUS AT BEDTIME
Refills: 0 | Status: DISCONTINUED | OUTPATIENT
Start: 2020-05-10 | End: 2020-05-10

## 2020-05-10 RX ORDER — INSULIN GLARGINE 100 [IU]/ML
14 INJECTION, SOLUTION SUBCUTANEOUS AT BEDTIME
Refills: 0 | Status: DISCONTINUED | OUTPATIENT
Start: 2020-05-10 | End: 2020-05-11

## 2020-05-10 RX ORDER — HYDROCORTISONE 1 %
1 OINTMENT (GRAM) TOPICAL EVERY 12 HOURS
Refills: 0 | Status: DISCONTINUED | OUTPATIENT
Start: 2020-05-10 | End: 2020-05-11

## 2020-05-10 RX ORDER — INSULIN LISPRO 100/ML
5 VIAL (ML) SUBCUTANEOUS
Refills: 0 | Status: DISCONTINUED | OUTPATIENT
Start: 2020-05-10 | End: 2020-05-11

## 2020-05-10 RX ADMIN — Medication 81 MILLIGRAM(S): at 11:01

## 2020-05-10 RX ADMIN — Medication 300 MILLIGRAM(S): at 11:01

## 2020-05-10 RX ADMIN — ENOXAPARIN SODIUM 40 MILLIGRAM(S): 100 INJECTION SUBCUTANEOUS at 17:30

## 2020-05-10 RX ADMIN — Medication 137 MICROGRAM(S): at 06:26

## 2020-05-10 RX ADMIN — INSULIN GLARGINE 14 UNIT(S): 100 INJECTION, SOLUTION SUBCUTANEOUS at 22:42

## 2020-05-10 RX ADMIN — Medication 1000 UNIT(S): at 17:31

## 2020-05-10 RX ADMIN — DULOXETINE HYDROCHLORIDE 20 MILLIGRAM(S): 30 CAPSULE, DELAYED RELEASE ORAL at 06:26

## 2020-05-10 RX ADMIN — OLANZAPINE 2.5 MILLIGRAM(S): 15 TABLET, FILM COATED ORAL at 22:18

## 2020-05-10 RX ADMIN — Medication 3 UNIT(S): at 12:31

## 2020-05-10 RX ADMIN — Medication 5 UNIT(S): at 17:31

## 2020-05-10 RX ADMIN — Medication 3 UNIT(S): at 08:16

## 2020-05-10 RX ADMIN — Medication 4: at 12:31

## 2020-05-10 RX ADMIN — Medication 30 MILLIGRAM(S): at 06:32

## 2020-05-10 RX ADMIN — DULOXETINE HYDROCHLORIDE 20 MILLIGRAM(S): 30 CAPSULE, DELAYED RELEASE ORAL at 17:30

## 2020-05-10 RX ADMIN — SENNA PLUS 2 TABLET(S): 8.6 TABLET ORAL at 22:18

## 2020-05-10 RX ADMIN — ATORVASTATIN CALCIUM 20 MILLIGRAM(S): 80 TABLET, FILM COATED ORAL at 22:19

## 2020-05-10 RX ADMIN — POLYETHYLENE GLYCOL 3350 17 GRAM(S): 17 POWDER, FOR SOLUTION ORAL at 11:02

## 2020-05-10 RX ADMIN — FAMOTIDINE 20 MILLIGRAM(S): 10 INJECTION INTRAVENOUS at 11:04

## 2020-05-10 NOTE — DISCHARGE NOTE PROVIDER - NSDCMRMEDTOKEN_GEN_ALL_CORE_FT
allopurinol 300 mg oral tablet: 1 tab(s) orally once a day  aspirin 81 mg oral tablet: 1 tab(s) orally once a day  atenolol 25 mg oral tablet: 1 tab(s) orally once a day  Co Q-10 100 mg oral capsule: 1 cap(s) orally once a day  DULoxetine 20 mg oral delayed release capsule: 1 cap(s) orally once a day  fenofibric acid 135 mg oral delayed release capsule: 1 cap(s) orally once a day  gabapentin 300 mg oral tablet: 2 tab(s) orally once a day (at bedtime)  HumaLOG 100 units/mL subcutaneous solution: 6 unit(s) subcutaneous 3 times a day (before meals)  levothyroxine 137 mcg (0.137 mg) oral tablet: 1 tab(s) orally once a day  metFORMIN 500 mg oral tablet, extended release: 2 tab(s) orally once a day  potassium chloride 10 mEq oral capsule, extended release: 1 cap(s) orally once a day  rosuvastatin 5 mg oral tablet: 1 tab(s) orally once a day  Toujeo SoloStar 300 units/mL subcutaneous solution: 28 unit(s) subcutaneous once a day (at bedtime)  Vitamin D3 2000 intl units (50 mcg) oral tablet: 1 tab(s) orally once a day allopurinol 300 mg oral tablet: 1 tab(s) orally once a day  aspirin 81 mg oral tablet: 1 tab(s) orally once a day  atenolol 25 mg oral tablet: 1 tab(s) orally once a day  Co Q-10 100 mg oral capsule: 1 cap(s) orally once a day  DULoxetine 20 mg oral delayed release capsule: 1 cap(s) orally 2 times a day  fenofibric acid 135 mg oral delayed release capsule: 1 cap(s) orally once a day  gabapentin 300 mg oral tablet: 2 tab(s) orally once a day (at bedtime)  HumaLOG 100 units/mL subcutaneous solution: 6 unit(s) subcutaneous 3 times a day (before meals)  hydrocortisone 2.5% topical ointment: 1 application topically every 12 hours  levothyroxine 137 mcg (0.137 mg) oral tablet: 1 tab(s) orally once a day  metFORMIN 500 mg oral tablet, extended release: 2 tab(s) orally once a day  OLANZapine 2.5 mg oral tablet: 1 tab(s) orally once a day (at bedtime)  potassium chloride 10 mEq oral capsule, extended release: 1 cap(s) orally once a day  predniSONE 20 mg oral tablet: 2 tab(s) orally every 24 hours  Protonix 40 mg oral delayed release tablet: 1 tab(s) orally once a day   rosuvastatin 5 mg oral tablet: 1 tab(s) orally once a day  sulfamethoxazole-trimethoprim 800 mg-160 mg oral tablet: 1 tab(s) orally once a day   Toujeo SoloStar 300 units/mL subcutaneous solution: 28 unit(s) subcutaneous once a day (at bedtime)  Vitamin D3 2000 intl units (50 mcg) oral tablet: 1 tab(s) orally once a day allopurinol 300 mg oral tablet: 1 tab(s) orally once a day  aspirin 81 mg oral tablet: 1 tab(s) orally once a day  atenolol 25 mg oral tablet: 1 tab(s) orally once a day  Co Q-10 100 mg oral capsule: 1 cap(s) orally once a day  DULoxetine 20 mg oral delayed release capsule: 1 cap(s) orally 2 times a day  fenofibric acid 135 mg oral delayed release capsule: 1 cap(s) orally once a day  gabapentin 300 mg oral tablet: 2 tab(s) orally once a day (at bedtime)  HumaLOG 100 units/mL subcutaneous solution: 6 unit(s) subcutaneous 3 times a day (before meals)  hydrocortisone 2.5% topical ointment: 1 application topically every 12 hours  levothyroxine 137 mcg (0.137 mg) oral tablet: 1 tab(s) orally once a day  metFORMIN 500 mg oral tablet, extended release: 2 tab(s) orally once a day  OLANZapine 2.5 mg oral tablet: 1 tab(s) orally once a day (at bedtime)  potassium chloride 10 mEq oral capsule, extended release: 1 cap(s) orally once a day  predniSONE 20 mg oral tablet: 2 tab(s) orally every 24 hours  Protonix 40 mg oral delayed release tablet: 1 tab(s) orally once a day   rosuvastatin 5 mg oral tablet: 1 tab(s) orally once a day  sulfamethoxazole-trimethoprim 800 mg-160 mg oral tablet: 1 tab(s) orally once a day   Toujeo SoloStar 300 units/mL subcutaneous solution: 28 unit(s) subcutaneous once a day (at bedtime)  Vitamin D3 2000 intl units (50 mcg) oral tablet: 1 tab(s) orally once a day  Xarelto 10 mg oral tablet: 1 tab(s) orally once a day

## 2020-05-10 NOTE — PROGRESS NOTE ADULT - ASSESSMENT
75M PMH DM2 admitted for hypoxic respiratory failure 2/2 covid PNA. 75M PMH DM2 admitted for hypoxic respiratory failure 2/2 COVID pneumonia managed in ICU with HFNC and awake proning, patient with decreasing oxygen requirements on RMF pending discharge with home O2.

## 2020-05-10 NOTE — PROGRESS NOTE ADULT - PROBLEM SELECTOR PROBLEM 1
Acute respiratory failure with hypoxia
Coronavirus infection
Coronavirus infection
Hypernatremia
Hyponatremia
Acute respiratory failure with hypoxia
Hyponatremia

## 2020-05-10 NOTE — PROGRESS NOTE ADULT - PROBLEM SELECTOR PROBLEM 8
Need for prophylactic measure
Prophylactic measure
Need for prophylactic measure
Prophylactic measure

## 2020-05-10 NOTE — PROGRESS NOTE ADULT - PROBLEM SELECTOR PROBLEM 4
LIZ (acute kidney injury)
HAP (hospital-acquired pneumonia)
LIZ (acute kidney injury)
HAP (hospital-acquired pneumonia)
LIZ (acute kidney injury)

## 2020-05-10 NOTE — DISCHARGE NOTE PROVIDER - NSDCFUADDAPPT_GEN_ALL_CORE_FT
Dr. Smith's office will call you with an appointment.    Dr. Jordan was notified of your progress during this admission and on discharge, please schedule an appointment to follow up with him outpatient.

## 2020-05-10 NOTE — PROGRESS NOTE ADULT - PROBLEM SELECTOR PLAN 2
-s/p Plaquenil (4/21-4/25), Azithro (4/2104/25), Toci 4/23 RESOLVED   Patient with acute hypoxic resp failure 2/2 COVID-19 pneumonia, with inc O2 requirements requiring HFNC. Improvement with awake proning. Patient successfully weaned to NC and RA.  -s/p Plaquenil (4/21-4/25), Azithro (4/2104/25), Toci 4/23

## 2020-05-10 NOTE — PROGRESS NOTE ADULT - PROBLEM SELECTOR PROBLEM 9
Nutrition, metabolism, and development symptoms
Encounter for support and coordination of transition of care

## 2020-05-10 NOTE — DISCHARGE NOTE PROVIDER - CARE PROVIDERS DIRECT ADDRESSES
[email or web address],DirectAddress_Unknown

## 2020-05-10 NOTE — DISCHARGE NOTE PROVIDER - CARE PROVIDER_API CALL
Chari Smith)  Critical Care Medicine; Internal Medicine; Pulmonary Disease  [address] [place] [address]  Phone: [phone]  Fax: [phone]  Follow Up Time: Chari Smith)  Critical Care Medicine; Internal Medicine; Pulmonary Disease  [address], [place] [address]  Phone: [phone]  Fax: [phone]  Follow Up Time: Chari Smith)  Critical Care Medicine; Internal Medicine; Pulmonary Disease  [address] [place] [address]  Phone: [phone]  Fax: [phone]  Follow Up Time:     Mykel Jordan  INTERNAL MEDICINE  [address] [address]  Phone: [phone]  Fax: [phone]  Established Patient  Follow Up Time: Chari Smith)  Critical Care Medicine; Internal Medicine; Pulmonary Disease  [address] [place] [address]  Phone: [phone]  Fax: [phone]  Follow Up Time:     Mykel Jordan  INTERNAL MEDICINE  [address] [address]  Phone: [phone]  Fax: [phone]  Established Patient  Scheduled Appointment: 05/25/2020 10:30 AM

## 2020-05-10 NOTE — PROGRESS NOTE ADULT - SUBJECTIVE AND OBJECTIVE BOX
**Incomplete Note**    OVERNIGHT EVENTS:    SUBJECTIVE / INTERVAL HPI: Patient seen and examined at bedside.     VITAL SIGNS:  Vital Signs Last 24 Hrs  T(C): 36.3 (10 May 2020 12:10), Max: 36.4 (09 May 2020 21:20)  T(F): 97.4 (10 May 2020 12:10), Max: 97.6 (09 May 2020 21:20)  HR: 83 (10 May 2020 12:27) (68 - 87)  BP: 147/87 (10 May 2020 12:27) (121/71 - 147/87)  BP(mean): --  RR: 18 (10 May 2020 12:10) (18 - 20)  SpO2: 92% (10 May 2020 12:27) (92% - 95%)    PHYSICAL EXAM:    General: WDWN  HEENT: NC/AT; PERRL, anicteric sclera; MMM  Neck: supple  Cardiovascular: +S1/S2; RRR  Respiratory: CTA B/L; no W/R/R  Gastrointestinal: soft, NT/ND; +BSx4  Extremities: WWP; no edema, clubbing or cyanosis  Vascular: 2+ radial, DP/PT pulses B/L  Neurological: AAOx3; no focal deficits    MEDICATIONS:  MEDICATIONS  (STANDING):  allopurinol 300 milliGRAM(s) Oral daily  aspirin  chewable 81 milliGRAM(s) Oral daily  atorvastatin 20 milliGRAM(s) Oral at bedtime  cholecalciferol 1000 Unit(s) Oral every 24 hours  dextrose 5%. 1000 milliLiter(s) (50 mL/Hr) IV Continuous <Continuous>  dextrose 50% Injectable 12.5 Gram(s) IV Push once  dextrose 50% Injectable 25 Gram(s) IV Push once  dextrose 50% Injectable 25 Gram(s) IV Push once  DULoxetine 20 milliGRAM(s) Oral two times a day  enoxaparin Injectable 40 milliGRAM(s) SubCutaneous every 24 hours  famotidine    Tablet 20 milliGRAM(s) Oral daily  insulin glargine Injectable (LANTUS) 14 Unit(s) SubCutaneous at bedtime  insulin lispro (HumaLOG) corrective regimen sliding scale   SubCutaneous Before meals and at bedtime  insulin lispro Injectable (HumaLOG) 5 Unit(s) SubCutaneous three times a day before meals  levothyroxine 137 MICROGram(s) Oral every 24 hours  OLANZapine 2.5 milliGRAM(s) Oral at bedtime  polyethylene glycol 3350 17 Gram(s) Oral daily  senna 2 Tablet(s) Oral at bedtime  trimethoprim  160 mG/sulfamethoxazole 800 mG 1 Tablet(s) Oral <User Schedule>    MEDICATIONS  (PRN):  acetaminophen   Tablet .. 650 milliGRAM(s) Oral every 6 hours PRN Temp greater or equal to 38C (100.4F)  bisacodyl Suppository 10 milliGRAM(s) Rectal daily PRN Constipation  dextrose 40% Gel 15 Gram(s) Oral once PRN Blood Glucose LESS THAN 70 milliGRAM(s)/deciliter  glucagon  Injectable 1 milliGRAM(s) IntraMuscular once PRN Glucose LESS THAN 70 milligrams/deciliter      ALLERGIES:  Allergies    No Known Allergies    Intolerances        LABS:                        15.0   8.27  )-----------( 149      ( 09 May 2020 07:20 )             44.6     05-09    138  |  104  |  25<H>  ----------------------------<  68<L>  3.6   |  22  |  1.22    Ca    8.8      09 May 2020 07:20  Phos  3.1     05-09  Mg     1.8     05-09    TPro  5.0<L>  /  Alb  3.0<L>  /  TBili  0.7  /  DBili  x   /  AST  32  /  ALT  58<H>  /  AlkPhos  58  05-09        CAPILLARY BLOOD GLUCOSE      POCT Blood Glucose.: 206 mg/dL (10 May 2020 11:57)      RADIOLOGY & ADDITIONAL TESTS: Reviewed.    ASSESSMENT:    PLAN: OVERNIGHT EVENTS: No acute events overnight. Afebrile. VSS. HD stable.     SUBJECTIVE / INTERVAL HPI: Patient seen and examined at bedside this AM. Patient complaining of itchiness all over his back, requesting application of lotion.     VITAL SIGNS:  Vital Signs Last 24 Hrs  T(C): 36.3 (10 May 2020 12:10), Max: 36.4 (09 May 2020 21:20)  T(F): 97.4 (10 May 2020 12:10), Max: 97.6 (09 May 2020 21:20)  HR: 83 (10 May 2020 12:27) (68 - 87)  BP: 147/87 (10 May 2020 12:27) (121/71 - 147/87)  BP(mean): --  RR: 18 (10 May 2020 12:10) (18 - 20)  SpO2: 92% (10 May 2020 12:27) (92% - 95%)    PHYSICAL EXAM:    General: elderly male resting comfortably in bed  HEENT: NC/AT; PERRL, anicteric sclera; MMM  Neck: supple; no JVD   Cardiovascular: +S1/S2; RRR  Respiratory: CTA B/L; no W/R/R  Gastrointestinal: soft, NT/ND; +BSx4  Extremities: WWP; no peripheral edema  Derm: back without evidence of erythema   Vascular: 2+ radial, DP/PT pulses B/L  Neurological: AAOx3; no focal deficits    MEDICATIONS:  MEDICATIONS  (STANDING):  allopurinol 300 milliGRAM(s) Oral daily  aspirin  chewable 81 milliGRAM(s) Oral daily  atorvastatin 20 milliGRAM(s) Oral at bedtime  cholecalciferol 1000 Unit(s) Oral every 24 hours  dextrose 5%. 1000 milliLiter(s) (50 mL/Hr) IV Continuous <Continuous>  dextrose 50% Injectable 12.5 Gram(s) IV Push once  dextrose 50% Injectable 25 Gram(s) IV Push once  dextrose 50% Injectable 25 Gram(s) IV Push once  DULoxetine 20 milliGRAM(s) Oral two times a day  enoxaparin Injectable 40 milliGRAM(s) SubCutaneous every 24 hours  famotidine    Tablet 20 milliGRAM(s) Oral daily  insulin glargine Injectable (LANTUS) 14 Unit(s) SubCutaneous at bedtime  insulin lispro (HumaLOG) corrective regimen sliding scale   SubCutaneous Before meals and at bedtime  insulin lispro Injectable (HumaLOG) 5 Unit(s) SubCutaneous three times a day before meals  levothyroxine 137 MICROGram(s) Oral every 24 hours  OLANZapine 2.5 milliGRAM(s) Oral at bedtime  polyethylene glycol 3350 17 Gram(s) Oral daily  senna 2 Tablet(s) Oral at bedtime  trimethoprim  160 mG/sulfamethoxazole 800 mG 1 Tablet(s) Oral <User Schedule>    MEDICATIONS  (PRN):  acetaminophen   Tablet .. 650 milliGRAM(s) Oral every 6 hours PRN Temp greater or equal to 38C (100.4F)  bisacodyl Suppository 10 milliGRAM(s) Rectal daily PRN Constipation  dextrose 40% Gel 15 Gram(s) Oral once PRN Blood Glucose LESS THAN 70 milliGRAM(s)/deciliter  glucagon  Injectable 1 milliGRAM(s) IntraMuscular once PRN Glucose LESS THAN 70 milligrams/deciliter      ALLERGIES:  Allergies    No Known Allergies    Intolerances        LABS:                        15.0   8.27  )-----------( 149      ( 09 May 2020 07:20 )             44.6     05-09    138  |  104  |  25<H>  ----------------------------<  68<L>  3.6   |  22  |  1.22    Ca    8.8      09 May 2020 07:20  Phos  3.1     05-09  Mg     1.8     05-09    TPro  5.0<L>  /  Alb  3.0<L>  /  TBili  0.7  /  DBili  x   /  AST  32  /  ALT  58<H>  /  AlkPhos  58  05-09        CAPILLARY BLOOD GLUCOSE      POCT Blood Glucose.: 206 mg/dL (10 May 2020 11:57)      RADIOLOGY & ADDITIONAL TESTS: Reviewed.    ASSESSMENT:    PLAN:

## 2020-05-10 NOTE — PROGRESS NOTE ADULT - PROBLEM SELECTOR PLAN 8
VTE: Lovenox   GI ppx: Famotidine 20mg   FULL CODE    Dispo: 5U COVID Tele
DVT: Lovenox  GI: pepcid  Dispo: pending PT eval
VTE: Lovenox   GI ppx: Famotidine 20mg   FULL CODE    Dispo: 5U COVID Tele
VTE: Lovenox   GI ppx: Famotidine 20mg   FULL CODE    Dispo: Admit to F
DVT: Lovenox  GI: pepcid  Dispo: pending PT eval

## 2020-05-10 NOTE — DISCHARGE NOTE PROVIDER - INSTRUCTIONS
Heart healthy diet: low salt (less than 2 grams per day), low cholesterol, low fat, low carbohydrate.

## 2020-05-10 NOTE — PROGRESS NOTE ADULT - PROBLEM SELECTOR PROBLEM 2
Hyponatremia
CKD (chronic kidney disease)
Hyponatremia
Metabolic encephalopathy
Pneumonia due to COVID-19 virus
Pneumonia due to COVID-19 virus
Metabolic encephalopathy

## 2020-05-10 NOTE — DISCHARGE NOTE PROVIDER - NSDCCPCAREPLAN_GEN_ALL_CORE_FT
PRINCIPAL DISCHARGE DIAGNOSIS  Diagnosis: Coronavirus infection  Assessment and Plan of Treatment:       SECONDARY DISCHARGE DIAGNOSES  Diagnosis: Hyponatremia  Assessment and Plan of Treatment:     Diagnosis: Pneumonia  Assessment and Plan of Treatment:     Diagnosis: Hyperlipidemia, unspecified hyperlipidemia type  Assessment and Plan of Treatment: Hyperlipidemia, unspecified hyperlipidemia type PRINCIPAL DISCHARGE DIAGNOSIS  Diagnosis: Coronavirus infection  Assessment and Plan of Treatment:       SECONDARY DISCHARGE DIAGNOSES  Diagnosis: On prednisone therapy  Assessment and Plan of Treatment:     Diagnosis: Hypothyroid  Assessment and Plan of Treatment:     Diagnosis: Hyperlipidemia, unspecified hyperlipidemia type  Assessment and Plan of Treatment: Hyperlipidemia, unspecified hyperlipidemia type    Diagnosis: Pneumonia  Assessment and Plan of Treatment: PRINCIPAL DISCHARGE DIAGNOSIS  Diagnosis: Coronavirus infection  Assessment and Plan of Treatment: You were found to have the novel new coronavirus, also known as COVID-19. This test resulted from a nasal swab that was done earlier in your admission to [facility]. Your symptoms improved dramatically during your hospital stay. You were managed with tociluzumab and steroids Should your test result be positive, the treatment is supportive care, which means: rest, hydration, and maintaining a good healthy diet. You may take Tylenol if you experience any fevers and Robitussin for cough.   If you start experiencing extreme shortness of breath, difficulty breathing resulting in the inability to even walk, please visit an urgent care.  Wear a mask at all times should you have to be outdoors briefly - and avoid crowds, public spaces, and mass transit at all times during this quarantine. Continue to wash your hands thoroughly and frequently. Please see the supplemented written instructions for further use.        SECONDARY DISCHARGE DIAGNOSES  Diagnosis: On prednisone therapy  Assessment and Plan of Treatment: You receievd high dose steroids while inpatient. You are discharged on prednisone 40mg daily for an extended period of time. Please follow up with Dr. Smith to deteremine the appropriate taper of these antibiotics.   You were started on Bactrim (an antibiotic) because long term steroids can weaken your immune system and this is to prevent PCP pneumnonia.   Please monitor your fingersticks while on steroids.    Diagnosis: Hypothyroid  Assessment and Plan of Treatment: You were previously diagnosed with hypothyroidism. Please continue to take your synthroid as prescribed and follow up with your PCP for further monitoring of your thyroid levels.      Diagnosis: Hyperlipidemia, unspecified hyperlipidemia type  Assessment and Plan of Treatment: You were previously diagnosed with high cholesterol. Please continue on your home medications at this time and follow up with your PMD for further surveillance and management of your high cholesterol.      Diagnosis: Pneumonia  Assessment and Plan of Treatment: You developed pneumnoa during your hospitalization and were treated with 5 days of antibiotics. Pneumonia is an infection of the lungs. Pneumonia may be caused by bacteria, viruses, or funguses. Symptoms include coughing, fever, chest pain when breathing deeply or coughing, shortness of breath, fatigue, or muscle aches. Pneumonia can be diagnosed with a medical history and physical exam, as well as other tests which may include a chest X-ray. If you were prescribed an antibiotic medicine, take it as told by your health care provider and do not stop taking the antibiotic even if you start to feel better. Do not use tobacco products, including cigarettes, chewing tobacco, and e-cigarettes.  SEEK IMMEDIATE MEDICAL CARE IF YOU HAVE ANY OF THE FOLLOWING SYMPTOMS: worsening shortness of breath, worsening chest pain, coughing up blood, change in mental status, lightheadedness/dizziness.

## 2020-05-10 NOTE — PROGRESS NOTE ADULT - ATTENDING COMMENTS
89F w AF on eliquis, Dementia (a/o to person, place), on home hospice and aides, recent h/o falls, p/w fall, found +COVID, now on RA – requires 2L w exertion pending set-up of home O2 and services  RICHARD O/N. ambulated on RA - desatted to 88%. RR increased but wo chest pain or lightheadedness  --will need to set-up home O2 - 2L NC; confirmation of home services  --returned prednisone back to 40mg per pulmonary. To keep dose until f/u w Dr. Smith  Dispo: to home

## 2020-05-10 NOTE — PROGRESS NOTE ADULT - PROBLEM SELECTOR PROBLEM 3
Metabolic encephalopathy
Coronavirus infection
Delirium
Metabolic encephalopathy
Delirium
Coronavirus infection

## 2020-05-10 NOTE — DISCHARGE NOTE PROVIDER - NSDCCPTREATMENT_GEN_ALL_CORE_FT
PRINCIPAL PROCEDURE  Procedure: CT chest wo con  Findings and Treatment: May 4, 2020   1. Interval resolution of groundglass and linear opacities noted predominantly in the right upper lobe with new more extensive groundglass, nodular solid and linear opacities opacities with a bronchovascular and peripheral distribution located within the upper mid and lower lung zones.  2. Persistent small right-sided and new small left-sided pleural effusions.  3. Mild aneurysmal dilatation of the aortic root measuring 4.2 cm. Periodic interval surveillance may be of value.         SECONDARY PROCEDURE  Procedure: CT chest wo con  Findings and Treatment: April 21, 2020   Bilateral lung findings as described above in detail. Covid  19 pneumonia is a possibility. Correlate with serologic testing. Differential diagnosis includes organizing pneumonia, fungal infection, granulomatosis with polyangiitis , bacterial pneumonia.      Procedure: US Doppler vein of extremity lower bilateral  Findings and Treatment: No evidence of bilateral lower extremity deep venous thrombosis.    Procedure: US retroperitoneum limited  Findings and Treatment: IMPRESSION:  Bilateral pleural effusions. Unremarkable examination of the kidneys.    Procedure: CT head wo con  Findings and Treatment: Impression: No acute intracranial injury.

## 2020-05-10 NOTE — PROGRESS NOTE ADULT - PROBLEM SELECTOR PLAN 9
DISCHARGE PLANNING:  Medical readiness goals: home O2 set up   Anticipated d/c: 24 hours   PCP:  Dr. Mykel Jordan    Pharmacy:

## 2020-05-10 NOTE — DISCHARGE NOTE PROVIDER - PROVIDER TOKENS
PROVIDER:[TOKEN:[[id number]]] PROVIDER:[TOKEN:[[id number]]],PROVIDER:[TOKEN:[[id number]],ESTABLISHEDPATIENT:[T]] PROVIDER:[TOKEN:[[id number]]],PROVIDER:[TOKEN:[[id number]],SCHEDULEDAPPT:[05/25/2020],SCHEDULEDAPPTTIME:[10:30 AM],ESTABLISHEDPATIENT:[T]]

## 2020-05-10 NOTE — PROGRESS NOTE ADULT - PROBLEM SELECTOR PLAN 1
-CT chest 5/4 demonstrated interval resolution of ground-glass and linear opacities in RUL, however w/ new more extensive ground-glass and upper mid and lower lung zones, persistent small b/l pleural effusion  -on RA, saturating 93% sp02   -OOBTC, IS  -s/p solumedrol 125q6h x 3d (4/28--4/30  -tapered from 40mg prednisone to 30mg on 5/9: slow taper per pulm  -c/w bactrim for PCP ppx Admitted with SOB in setting of COVID-19. Managed with awake proning, tociluzumab and steroids. CT chest 5/4 demonstrated interval resolution of ground-glass and linear opacities in RUL, however w/ new more extensive ground-glass and upper mid and lower lung zones, persistent small b/l pleural effusion  -on RA, saturating 93% sp02  - Desaturating to 89-91% on RA with ambulation (with PT as well) -> d/c with home O2    - OOBTC, IS  - s/p solumedrol 125q6h x 3d (4/28--4/30  - c/w 40mg qd -> patient to undergo extended taper and follow up with Dr. Smith outpatient.   -c/w bactrim for PCP ppx

## 2020-05-10 NOTE — DISCHARGE NOTE PROVIDER - HOSPITAL COURSE
Patient is __ yo M/F with past medical history of _____    Presented with _____, found to have _____        Problem List/Main Diagnoses (system-based):         Inpatient treatment course:         New medications:         Labs to be followed outpatient:         Exam to be followed outpatient: 74 y/o M with DM2 admitted for hypoxic respiratory failure 2/2 COVID pneumonia managed in ICU with HFNC and awake proning, and steroids with decreasing oxygen requirements.         Problem List/Main Diagnoses (system-based):     Acute respiratory failure with hypoxia 2/2 COVID-19 pneumonia: Patient admitted with SOB in setting of COVID-19. Managed with awake proning, tociluzumab and steroids (solumedrol 125q6h x 3d (4/28-4/30), and subsequent prednisone taper 40mg qd, to be continue outpatient). CT chest 5/4 demonstrated interval resolution of ground-glass and linear opacities in RUL, however w/ new more extensive ground-glass and upper mid and lower lung zones, persistent small b/l pleural effusion. Bactrim initiated for PCP prophylaxis in setting of prednisone taper.     COVID-19 pneumonia: Patient COVID-19 positive (4/21) with increasing2 requirements requiring HFNC. Improvement with awake proning. Patient s/p Plaquenil (4/21-4/25), tociluzumab (4/23) and steroids. Patient successfully weaned to NC and RA. However with 2L oxygen requirements (desaturating to 89-91% with ambulation).    Hypernatremia: Patient with hypernatremia in setting of decreased PO intake, managed with D5 and free water. Resolved prior to discharge.     Delirium: Patient with hyperactive delirium and sundowning, likely 2/2 underlying dementia vs COVID encephalopathy vs ICU delirium Psychiatry consulted, haldol and Seroquel not utilized in setting of prolonged QTc. Home Cymbalta increased to 20mg BID (from 20mg qd), with addition of Zyprexa 2.5mg.     HAP: Concern for community acquired pneumonia in setting of leukocytosis, managed with cefpodoxime escalated to Zosyn (4/22-4/27).  MRSA swab negative, no vancomycin.     Diabetes, Type 2: Patient with known history of diabetes. HgA1c 6.6. Blood sugars inconsistent in setting of NPO, D5 utilization for hypernatremia and steroid utilization. Patient received lantus 14-18 units inpatient and lispro 3-5 units, in addition to sliding scale insulin.     Hypothyroidism: Patient with history of hypothyrodisim. TSH 4.479 (WNL), home synthroid 137mcg qd continued inpatient.    Hyperlipidemia: Patient with history of hyperlipidemia, home atorvastatin 20mg continued inpatient.        Inpatient treatment course: 74 y/o M with DMII, sleep apnea (on CPAP of 6 ay bedtime outpatient) who initially presented 4/21 for subjective fever x 1 wk, found to be COVID+ and admitted to [facility] for viral PNA 2/2 COVID-9; subsequently stepped up to COVID tele 4/25 for acute hypoxic respiratory failure w/ increased WOB on 15L NRB & increasing lethargy. Of note, patient was saturating well on RA on admission, subsequently requiring 4L NC 4/24, 15L NRB 4/25, HFNC + NRB after transfer to ICU. Initiated CPAP o/n 4/26, with poor patient compliance necessitating sedation. Hospital course c/b hyponatremia, since resolved; head CT completed for recent hx mechanical fall prior to adm, neg for acute pathology. GOC d/w patient and wife 4/26 and he is to remain FULL CODE. Continued on Zosyn (4/24-4/27) for HAP coverage given increasing O2 requirements, leukocytosis, L mid-lung consolidation on CXR. [facility] for increased monitoring and high risk of intubation. AOx3 at baseline, NPO saturating 92% on CPAP at time of transfer. Oxygen requirements have improved since transfer from CPAP -> NRB -> Venti mask -> NC, to currently RA. Long term prednisone taper initaited inpatient, patient to follow up outpatient. Sundowning improving w/Zyprexa and Cymbalta. PT evaulation with recommendation of home with home physical therapy. Patient desaturating to 89-91% on RA with ambulation, discharged with home O2. Patient was medically optimized, stable and ready for discharge. Plan of care and return precautions were discussed with the patient who verbally stated understanding.        New medications: Prednisone 40mg qd (to follow up with Dr. Smith outpatient); Bactrim DS for PCP ppx        Labs to be followed outpatient: N/A        Exam to be followed outpatient: Comprehensive physical exam        Dispo: Home  w/home PT and O2 (2L) 76 y/o M with DM2 admitted for hypoxic respiratory failure 2/2 COVID pneumonia managed in ICU with HFNC and awake proning, and steroids with decreasing oxygen requirements.         Problem List/Main Diagnoses (system-based):     Acute respiratory failure with hypoxia 2/2 COVID-19 pneumonia: Patient admitted with SOB in setting of COVID-19. Managed with awake proning, tociluzumab and steroids (solumedrol 125q6h x 3d (4/28-4/30), and subsequent prednisone taper 40mg qd, to be continue outpatient). CT chest 5/4 demonstrated interval resolution of ground-glass and linear opacities in RUL, however w/ new more extensive ground-glass and upper mid and lower lung zones, persistent small b/l pleural effusion. Bactrim initiated for PCP prophylaxis in setting of prednisone taper.     COVID-19 pneumonia: Patient COVID-19 positive (4/21) with increasing2 requirements requiring HFNC. Improvement with awake proning. Patient s/p Plaquenil (4/21-4/25), tociluzumab (4/23) and steroids. Patient successfully weaned to NC and RA. However with 2L oxygen requirements (desaturating to 89-91% with ambulation).    Hypernatremia: Patient with hypernatremia in setting of decreased PO intake, managed with D5 and free water. Resolved prior to discharge.     Delirium: Patient with hyperactive delirium and sundowning, likely 2/2 underlying dementia vs COVID encephalopathy vs ICU delirium Psychiatry consulted, haldol and Seroquel not utilized in setting of prolonged QTc. Home Cymbalta increased to 20mg BID (from 20mg qd), with addition of Zyprexa 2.5mg.     HAP: Concern for community acquired pneumonia in setting of leukocytosis, managed with cefpodoxime escalated to Zosyn (4/22-4/27).  MRSA swab negative, no vancomycin.     Diabetes, Type 2: Patient with known history of diabetes. HgA1c 6.6. Blood sugars inconsistent in setting of NPO, D5 utilization for hypernatremia and steroid utilization. Patient received lantus 14-18 units inpatient and lispro 3-5 units, in addition to sliding scale insulin.     Hypothyroidism: Patient with history of hypothyrodisim. TSH 4.479 (WNL), home synthroid 137mcg qd continued inpatient.    Hyperlipidemia: Patient with history of hyperlipidemia, home atorvastatin 20mg continued inpatient.        Inpatient treatment course: 76 y/o M with DMII, sleep apnea (on CPAP of 6 ay bedtime outpatient) who initially presented 4/21 for subjective fever x 1 wk, found to be COVID+ and admitted to [facility] for viral PNA 2/2 COVID-9; subsequently stepped up to COVID tele 4/25 for acute hypoxic respiratory failure w/ increased WOB on 15L NRB & increasing lethargy. Of note, patient was saturating well on RA on admission, subsequently requiring 4L NC 4/24, 15L NRB 4/25, HFNC + NRB after transfer to ICU. Initiated CPAP o/n 4/26, with poor patient compliance necessitating sedation. Hospital course c/b hyponatremia, since resolved; head CT completed for recent hx mechanical fall prior to adm, neg for acute pathology. GOC d/w patient and wife 4/26 and he is to remain FULL CODE. Continued on Zosyn (4/24-4/27) for HAP coverage given increasing O2 requirements, leukocytosis, L mid-lung consolidation on CXR. [facility] for increased monitoring and high risk of intubation. AOx3 at baseline, NPO saturating 92% on CPAP at time of transfer. Oxygen requirements have improved since transfer from CPAP -> NRB -> Venti mask -> NC, to currently RA. Long term prednisone taper initaited inpatient, patient to follow up outpatient. Sundowning improving w/Zyprexa and Cymbalta. PT evaulation with recommendation of home with home physical therapy. Patient desaturating to 89-91% on RA with ambulation, discharged with home O2. Discuss care plan with patient, wife, and PCP, Dr. Mykel Jordan. Patient was medically optimized, stable and ready for discharge. Plan of care and return precautions were discussed with the patient who verbally stated understanding.        New medications: Prednisone 40mg qd (to follow up with Dr. Smith outpatient); Bactrim DS for PCP ppx        Labs to be followed outpatient: N/A        Exam to be followed outpatient: Comprehensive physical exam        Dispo: Home  w/home PT and O2 (2L) 74 y/o M with DM2 admitted for hypoxic respiratory failure 2/2 COVID pneumonia managed in ICU with HFNC and awake proning, and steroids with decreasing oxygen requirements.         Problem List/Main Diagnoses (system-based):     Acute respiratory failure with hypoxia 2/2 COVID-19 pneumonia: Patient admitted with SOB in setting of COVID-19. Managed with awake proning, tociluzumab and steroids (solumedrol 125q6h x 3d (4/28-4/30), and subsequent prednisone taper 40mg qd, to be continue outpatient). CT chest 5/4 demonstrated interval resolution of ground-glass and linear opacities in RUL, however w/ new more extensive ground-glass and upper mid and lower lung zones, persistent small b/l pleural effusion. Bactrim initiated for PCP prophylaxis in setting of prednisone taper.     COVID-19 pneumonia: Patient COVID-19 positive (4/21) with increasing2 requirements requiring HFNC. Improvement with awake proning. Patient s/p Plaquenil (4/21-4/25), tociluzumab (4/23) and steroids. Patient successfully weaned to NC and RA. However with 2L oxygen requirements (desaturating to 89-91% with ambulation).    Hypernatremia: Patient with hypernatremia in setting of decreased PO intake, managed with D5 and free water. Resolved prior to discharge.     Delirium: Patient with hyperactive delirium and sundowning, likely 2/2 underlying dementia vs COVID encephalopathy vs ICU delirium Psychiatry consulted, haldol and Seroquel not utilized in setting of prolonged QTc. Home Cymbalta increased to 20mg BID (from 20mg qd), with addition of Zyprexa 2.5mg.     HAP: Concern for community acquired pneumonia in setting of leukocytosis, managed with cefpodoxime escalated to Zosyn (4/22-4/27).  MRSA swab negative, no vancomycin.     Diabetes, Type 2: Patient with known history of diabetes. HgA1c 6.6. Blood sugars inconsistent in setting of NPO, D5 utilization for hypernatremia and steroid utilization. Patient received lantus 14-18 units inpatient and lispro 3-5 units, in addition to sliding scale insulin.     Hypothyroidism: Patient with history of hypothyrodisim. TSH 4.479 (WNL), home synthroid 137mcg qd continued inpatient.    Hyperlipidemia: Patient with history of hyperlipidemia, home atorvastatin 20mg continued inpatient.        Inpatient treatment course: 74 y/o M with DMII, sleep apnea (on CPAP of 6 ay bedtime outpatient) who initially presented 4/21 for subjective fever x 1 wk, found to be COVID+ and admitted to [facility] for viral PNA 2/2 COVID-9; subsequently stepped up to COVID tele 4/25 for acute hypoxic respiratory failure w/ increased WOB on 15L NRB & increasing lethargy. Of note, patient was saturating well on RA on admission, subsequently requiring 4L NC 4/24, 15L NRB 4/25, HFNC + NRB after transfer to ICU. Initiated CPAP o/n 4/26, with poor patient compliance necessitating sedation. Hospital course c/b hyponatremia, since resolved; head CT completed for recent hx mechanical fall prior to adm, neg for acute pathology. GOC d/w patient and wife 4/26 and he is to remain FULL CODE. Continued on Zosyn (4/24-4/27) for HAP coverage given increasing O2 requirements, leukocytosis, L mid-lung consolidation on CXR. [facility] for increased monitoring and high risk of intubation. AOx3 at baseline, NPO saturating 92% on CPAP at time of transfer. Oxygen requirements have improved since transfer from CPAP -> NRB -> Venti mask -> NC, to currently RA. Long term prednisone taper initaited inpatient, patient to follow up outpatient. Sundowning improving w/Zyprexa and Cymbalta. PT evaulation with recommendation of home with home physical therapy. Patient desaturating to 89-91% on RA with ambulation, discharged with home O2. Discuss care plan with patient, wife, and PCP, Dr. Mykel Jordan. Patient was medically optimized, stable and ready for discharge. Plan of care and return precautions were discussed with the patient who verbally stated understanding.        New medications: Prednisone 40mg qd (to follow up with Dr. Smith outpatient); Bactrim DS for PCP ppx; Xarelto 15mg; Hydrocortisone cream; Protonix 40mg qd; Zyprex 2.5mg qd         Labs to be followed outpatient: N/A        Exam to be followed outpatient: Comprehensive physical exam        Dispo: Home w/home PT and O2 (2L) 76 y/o M with DM2 admitted for hypoxic respiratory failure 2/2 COVID pneumonia managed in ICU with HFNC and awake proning, and steroids with decreasing oxygen requirements.         Problem List/Main Diagnoses (system-based):     Acute respiratory failure with hypoxia 2/2 COVID-19 pneumonia: Patient admitted with SOB in setting of COVID-19. Managed with awake proning, tociluzumab and steroids (solumedrol 125q6h x 3d (4/28-4/30), and subsequent prednisone taper 40mg qd, to be continue outpatient). CT chest 5/4 demonstrated interval resolution of ground-glass and linear opacities in RUL, however w/ new more extensive ground-glass and upper mid and lower lung zones, persistent small b/l pleural effusion. Bactrim initiated for PCP prophylaxis in setting of prednisone taper.     COVID-19 pneumonia: Patient COVID-19 positive (4/21) with increasing2 requirements requiring HFNC. Improvement with awake proning. Patient s/p Plaquenil (4/21-4/25), tociluzumab (4/23) and steroids. Patient successfully weaned to NC and RA. However with 2L oxygen requirements (desaturating to 89-91% with ambulation).    Hypernatremia: Patient with hypernatremia in setting of decreased PO intake, managed with D5 and free water. Resolved prior to discharge.     Delirium: Patient with hyperactive delirium and sundowning, likely 2/2 underlying dementia vs COVID encephalopathy vs ICU delirium Psychiatry consulted, haldol and Seroquel not utilized in setting of prolonged QTc. Home Cymbalta increased to 20mg BID (from 20mg qd), with addition of Zyprexa 2.5mg.     HAP: Concern for community acquired pneumonia in setting of leukocytosis, managed with cefpodoxime escalated to Zosyn (4/22-4/27).  MRSA swab negative, no vancomycin.     Diabetes, Type 2: Patient with known history of diabetes. HgA1c 6.6. Blood sugars inconsistent in setting of NPO, D5 utilization for hypernatremia and steroid utilization. Patient received lantus 14-18 units inpatient and lispro 3-5 units, in addition to sliding scale insulin.     Hypothyroidism: Patient with history of hypothyrodisim. TSH 4.479 (WNL), home synthroid 137mcg qd continued inpatient.    Hyperlipidemia: Patient with history of hyperlipidemia, home atorvastatin 20mg continued inpatient.        Inpatient treatment course: 76 y/o M with DMII, sleep apnea (on CPAP of 6 ay bedtime outpatient) who initially presented 4/21 for subjective fever x 1 wk, found to be COVID+ and admitted to [facility] for viral PNA 2/2 COVID-9; subsequently stepped up to COVID tele 4/25 for acute hypoxic respiratory failure w/ increased WOB on 15L NRB & increasing lethargy. Of note, patient was saturating well on RA on admission, subsequently requiring 4L NC 4/24, 15L NRB 4/25, HFNC + NRB after transfer to ICU. Initiated CPAP o/n 4/26, with poor patient compliance necessitating sedation. Hospital course c/b hyponatremia, since resolved; head CT completed for recent hx mechanical fall prior to adm, neg for acute pathology. GOC d/w patient and wife 4/26 and he is to remain FULL CODE. Continued on Zosyn (4/24-4/27) for HAP coverage given increasing O2 requirements, leukocytosis, L mid-lung consolidation on CXR. [facility] for increased monitoring and high risk of intubation. AOx3 at baseline, NPO saturating 92% on CPAP at time of transfer. Oxygen requirements have improved since transfer from CPAP -> NRB -> Venti mask -> NC, to currently RA. Long term prednisone taper initaited inpatient, patient to follow up outpatient. Sundowning improving w/Zyprexa and Cymbalta. PT evaulation with recommendation of home with home physical therapy. Patient desaturating to 89-91% on RA with ambulation, discharged with home O2. Care plan and treatment course discussed with patient, wife, and PCP, Dr. Mykel Jordan. Patient was medically optimized, stable and ready for discharge. Plan of care and return precautions were discussed with the patient who verbally stated understanding.        New medications: Prednisone 40mg qd (to follow up with Dr. Smith outpatient); Bactrim DS for PCP ppx; Xarelto 15mg; Hydrocortisone cream; Protonix 40mg qd; Zyprex 2.5mg qd         Labs to be followed outpatient: N/A        Exam to be followed outpatient: Comprehensive physical exam        Dispo: Home w/home PT and O2 (2L) 76 y/o M with DM2 admitted for hypoxic respiratory failure 2/2 COVID pneumonia managed in ICU with HFNC and awake proning, and steroids with decreasing oxygen requirements.         Problem List/Main Diagnoses (system-based):     Acute respiratory failure with hypoxia 2/2 COVID-19 pneumonia: Patient admitted with SOB in setting of COVID-19. Managed with awake proning, tociluzumab and steroids (solumedrol 125q6h x 3d (4/28-4/30), and subsequent prednisone taper 40mg qd, to be continue outpatient). CT chest 5/4 demonstrated interval resolution of ground-glass and linear opacities in RUL, however w/ new more extensive ground-glass and upper mid and lower lung zones, persistent small b/l pleural effusion. Bactrim initiated for PCP prophylaxis in setting of prednisone taper.     COVID-19 pneumonia: Patient COVID-19 positive (4/21) with increasing2 requirements requiring HFNC. Improvement with awake proning. Patient s/p Plaquenil (4/21-4/25), tociluzumab (4/23) and steroids. Patient successfully weaned to NC and RA. However with 2L oxygen requirements (desaturating to 89-91% with ambulation).    Hypernatremia: Patient with hypernatremia in setting of decreased PO intake, managed with D5 and free water. Resolved prior to discharge.     Delirium: Patient with hyperactive delirium and sundowning, likely 2/2 underlying dementia vs COVID encephalopathy vs ICU delirium Psychiatry consulted, haldol and Seroquel not utilized in setting of prolonged QTc. Home Cymbalta increased to 20mg BID (from 20mg qd), with addition of Zyprexa 2.5mg.     HAP: Concern for community acquired pneumonia in setting of leukocytosis, managed with cefpodoxime escalated to Zosyn (4/22-4/27).  MRSA swab negative, no vancomycin.     Diabetes, Type 2: Patient with known history of diabetes. HgA1c 6.6. Blood sugars inconsistent in setting of NPO, D5 utilization for hypernatremia and steroid utilization. Patient received lantus 14-18 units inpatient and lispro 3-5 units, in addition to sliding scale insulin.     Hypothyroidism: Patient with history of hypothyrodisim. TSH 4.479 (WNL), home synthroid 137mcg qd continued inpatient.    Hyperlipidemia: Patient with history of hyperlipidemia, home atorvastatin 20mg continued inpatient.    DVT PPx: Patient with elevated d-dimer 2355 (5/1); otherwise <700. LE dopplers negative. Patient remained on prophylactic Lovenox inpatient and discharged on Xarelto for prophylaxis.         Inpatient treatment course: 76 y/o M with DMII, sleep apnea (on CPAP of 6 ay bedtime outpatient) who initially presented 4/21 for subjective fever x 1 wk, found to be COVID+ and admitted to [facility] for viral PNA 2/2 COVID-9; subsequently stepped up to [facility] tele 4/25 for acute hypoxic respiratory failure w/ increased WOB on 15L NRB & increasing lethargy. Of note, patient was saturating well on RA on admission, subsequently requiring 4L NC 4/24, 15L NRB 4/25, HFNC + NRB after transfer to ICU. Initiated CPAP o/n 4/26, with poor patient compliance necessitating sedation. Hospital course c/b hyponatremia, since resolved; head CT completed for recent hx mechanical fall prior to adm, neg for acute pathology. GOC d/w patient and wife 4/26 and he is to remain FULL CODE. Continued on Zosyn (4/24-4/27) for HAP coverage given increasing O2 requirements, leukocytosis, L mid-lung consolidation on CXR. Stepped up to [facility] ICU 4/27 for increased monitoring and high risk of intubation. AOx3 at baseline, NPO saturating 92% on CPAP at time of transfer. Oxygen requirements have improved since transfer from CPAP -> NRB -> Venti mask -> NC, to currently RA. Long term prednisone taper initaited inpatient, patient to follow up outpatient. Sundowning improving w/Zyprexa and Cymbalta. PT evaulation with recommendation of home with home physical therapy. Patient desaturating to 89-91% on RA with ambulation, discharged with home O2. Care plan and treatment course discussed with patient, wife, and PCP, Dr. Mykel Jordan. Patient was medically optimized, stable and ready for discharge. Plan of care and return precautions were discussed with the patient who verbally stated understanding.        New medications: Prednisone 40mg qd (to follow up with Dr. Smith outpatient); Bactrim DS for PCP ppx; Xarelto 15mg qd (30 days); Hydrocortisone cream; Protonix 40mg qd; Zyprex 2.5mg qd         Labs to be followed outpatient: N/A        Exam to be followed outpatient: Comprehensive physical exam        Dispo: Home w/home PT and O2 (2L)

## 2020-05-11 VITALS
RESPIRATION RATE: 18 BRPM | SYSTOLIC BLOOD PRESSURE: 114 MMHG | TEMPERATURE: 98 F | OXYGEN SATURATION: 93 % | HEART RATE: 91 BPM | DIASTOLIC BLOOD PRESSURE: 68 MMHG

## 2020-05-11 PROBLEM — E11.9 TYPE 2 DIABETES MELLITUS WITHOUT COMPLICATIONS: Chronic | Status: ACTIVE | Noted: 2020-04-21

## 2020-05-11 PROBLEM — Z00.00 ENCOUNTER FOR PREVENTIVE HEALTH EXAMINATION: Status: ACTIVE | Noted: 2020-05-11

## 2020-05-11 LAB
GLUCOSE BLDC GLUCOMTR-MCNC: 132 MG/DL — HIGH (ref 70–99)
GLUCOSE BLDC GLUCOMTR-MCNC: 261 MG/DL — HIGH (ref 70–99)
GLUCOSE BLDC GLUCOMTR-MCNC: 58 MG/DL — LOW (ref 70–99)

## 2020-05-11 PROCEDURE — 99239 HOSP IP/OBS DSCHRG MGMT >30: CPT | Mod: CS

## 2020-05-11 RX ORDER — OLANZAPINE 15 MG/1
1 TABLET, FILM COATED ORAL
Qty: 30 | Refills: 0
Start: 2020-05-11 | End: 2020-06-09

## 2020-05-11 RX ORDER — RIVAROXABAN 15 MG-20MG
1 KIT ORAL
Qty: 30 | Refills: 0
Start: 2020-05-11 | End: 2020-06-09

## 2020-05-11 RX ORDER — PANTOPRAZOLE SODIUM 20 MG/1
1 TABLET, DELAYED RELEASE ORAL
Qty: 30 | Refills: 0
Start: 2020-05-11 | End: 2020-06-09

## 2020-05-11 RX ORDER — FAMOTIDINE 10 MG/ML
1 INJECTION INTRAVENOUS
Qty: 0 | Refills: 0 | DISCHARGE
Start: 2020-05-11

## 2020-05-11 RX ORDER — HYDROCORTISONE 1 %
1 OINTMENT (GRAM) TOPICAL
Qty: 1 | Refills: 0
Start: 2020-05-11 | End: 2020-06-09

## 2020-05-11 RX ORDER — OLANZAPINE 15 MG/1
1 TABLET, FILM COATED ORAL
Qty: 0 | Refills: 0 | DISCHARGE
Start: 2020-05-11

## 2020-05-11 RX ORDER — DULOXETINE HYDROCHLORIDE 30 MG/1
1 CAPSULE, DELAYED RELEASE ORAL
Qty: 0 | Refills: 0 | DISCHARGE
Start: 2020-05-11

## 2020-05-11 RX ORDER — INSULIN GLARGINE 100 [IU]/ML
7 INJECTION, SOLUTION SUBCUTANEOUS AT BEDTIME
Refills: 0 | Status: DISCONTINUED | OUTPATIENT
Start: 2020-05-11 | End: 2020-05-11

## 2020-05-11 RX ORDER — DULOXETINE HYDROCHLORIDE 30 MG/1
1 CAPSULE, DELAYED RELEASE ORAL
Qty: 0 | Refills: 0 | DISCHARGE

## 2020-05-11 RX ORDER — HYDROCORTISONE 1 %
1 OINTMENT (GRAM) TOPICAL
Qty: 0 | Refills: 0 | DISCHARGE
Start: 2020-05-11

## 2020-05-11 RX ADMIN — Medication 1 APPLICATION(S): at 00:07

## 2020-05-11 RX ADMIN — DULOXETINE HYDROCHLORIDE 20 MILLIGRAM(S): 30 CAPSULE, DELAYED RELEASE ORAL at 06:36

## 2020-05-11 RX ADMIN — Medication 5 UNIT(S): at 11:58

## 2020-05-11 RX ADMIN — POLYETHYLENE GLYCOL 3350 17 GRAM(S): 17 POWDER, FOR SOLUTION ORAL at 11:49

## 2020-05-11 RX ADMIN — Medication 1 TABLET(S): at 11:49

## 2020-05-11 RX ADMIN — Medication 300 MILLIGRAM(S): at 11:48

## 2020-05-11 RX ADMIN — Medication 6: at 11:57

## 2020-05-11 RX ADMIN — Medication 1 APPLICATION(S): at 06:37

## 2020-05-11 RX ADMIN — FAMOTIDINE 20 MILLIGRAM(S): 10 INJECTION INTRAVENOUS at 11:49

## 2020-05-11 RX ADMIN — Medication 40 MILLIGRAM(S): at 06:36

## 2020-05-11 RX ADMIN — Medication 81 MILLIGRAM(S): at 11:49

## 2020-05-11 RX ADMIN — Medication 137 MICROGRAM(S): at 06:36

## 2020-05-18 ENCOUNTER — APPOINTMENT (OUTPATIENT)
Dept: PULMONOLOGY | Facility: CLINIC | Age: 75
End: 2020-05-18
Payer: MEDICARE

## 2020-05-18 DIAGNOSIS — E11.649 TYPE 2 DIABETES MELLITUS WITH HYPOGLYCEMIA WITHOUT COMA: ICD-10-CM

## 2020-05-18 DIAGNOSIS — Z00.6 ENCOUNTER FOR EXAMINATION FOR NORMAL COMPARISON AND CONTROL IN CLINICAL RESEARCH PROGRAM: ICD-10-CM

## 2020-05-18 DIAGNOSIS — Z99.89 DEPENDENCE ON OTHER ENABLING MACHINES AND DEVICES: ICD-10-CM

## 2020-05-18 DIAGNOSIS — M89.8X9 OTHER SPECIFIED DISORDERS OF BONE, UNSPECIFIED SITE: ICD-10-CM

## 2020-05-18 DIAGNOSIS — N17.0 ACUTE KIDNEY FAILURE WITH TUBULAR NECROSIS: ICD-10-CM

## 2020-05-18 DIAGNOSIS — Z79.4 LONG TERM (CURRENT) USE OF INSULIN: ICD-10-CM

## 2020-05-18 DIAGNOSIS — E44.1 MILD PROTEIN-CALORIE MALNUTRITION: ICD-10-CM

## 2020-05-18 DIAGNOSIS — I12.9 HYPERTENSIVE CHRONIC KIDNEY DISEASE WITH STAGE 1 THROUGH STAGE 4 CHRONIC KIDNEY DISEASE, OR UNSPECIFIED CHRONIC KIDNEY DISEASE: ICD-10-CM

## 2020-05-18 DIAGNOSIS — E87.3 ALKALOSIS: ICD-10-CM

## 2020-05-18 DIAGNOSIS — D72.829 ELEVATED WHITE BLOOD CELL COUNT, UNSPECIFIED: ICD-10-CM

## 2020-05-18 DIAGNOSIS — Z79.890 HORMONE REPLACEMENT THERAPY: ICD-10-CM

## 2020-05-18 DIAGNOSIS — I45.5 OTHER SPECIFIED HEART BLOCK: ICD-10-CM

## 2020-05-18 DIAGNOSIS — G47.30 SLEEP APNEA, UNSPECIFIED: ICD-10-CM

## 2020-05-18 DIAGNOSIS — E78.00 PURE HYPERCHOLESTEROLEMIA, UNSPECIFIED: ICD-10-CM

## 2020-05-18 DIAGNOSIS — E87.0 HYPEROSMOLALITY AND HYPERNATREMIA: ICD-10-CM

## 2020-05-18 DIAGNOSIS — R00.1 BRADYCARDIA, UNSPECIFIED: ICD-10-CM

## 2020-05-18 DIAGNOSIS — R19.7 DIARRHEA, UNSPECIFIED: ICD-10-CM

## 2020-05-18 DIAGNOSIS — I45.10 UNSPECIFIED RIGHT BUNDLE-BRANCH BLOCK: ICD-10-CM

## 2020-05-18 DIAGNOSIS — F05 DELIRIUM DUE TO KNOWN PHYSIOLOGICAL CONDITION: ICD-10-CM

## 2020-05-18 DIAGNOSIS — J12.89 OTHER VIRAL PNEUMONIA: ICD-10-CM

## 2020-05-18 DIAGNOSIS — J69.0 PNEUMONITIS DUE TO INHALATION OF FOOD AND VOMIT: ICD-10-CM

## 2020-05-18 DIAGNOSIS — U07.1 COVID-19: ICD-10-CM
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
## 2020-05-18 DIAGNOSIS — N18.9 CHRONIC KIDNEY DISEASE, UNSPECIFIED: ICD-10-CM

## 2020-05-18 DIAGNOSIS — Z79.899 OTHER LONG TERM (CURRENT) DRUG THERAPY: ICD-10-CM

## 2020-05-18 DIAGNOSIS — L25.8 UNSPECIFIED CONTACT DERMATITIS DUE TO OTHER AGENTS: ICD-10-CM

## 2020-05-18 DIAGNOSIS — E11.42 TYPE 2 DIABETES MELLITUS WITH DIABETIC POLYNEUROPATHY: ICD-10-CM

## 2020-05-18 DIAGNOSIS — R13.10 DYSPHAGIA, UNSPECIFIED: ICD-10-CM

## 2020-05-18 DIAGNOSIS — G92 TOXIC ENCEPHALOPATHY: ICD-10-CM

## 2020-05-18 DIAGNOSIS — Z79.82 LONG TERM (CURRENT) USE OF ASPIRIN: ICD-10-CM

## 2020-05-18 DIAGNOSIS — E87.1 HYPO-OSMOLALITY AND HYPONATREMIA: ICD-10-CM

## 2020-05-18 DIAGNOSIS — E86.0 DEHYDRATION: ICD-10-CM

## 2020-05-18 DIAGNOSIS — Z91.81 HISTORY OF FALLING: ICD-10-CM

## 2020-05-18 DIAGNOSIS — E03.9 HYPOTHYROIDISM, UNSPECIFIED: ICD-10-CM

## 2020-05-18 DIAGNOSIS — J96.01 ACUTE RESPIRATORY FAILURE WITH HYPOXIA: ICD-10-CM

## 2020-05-18 DIAGNOSIS — E87.2 ACIDOSIS: ICD-10-CM

## 2020-05-18 DIAGNOSIS — F32.9 MAJOR DEPRESSIVE DISORDER, SINGLE EPISODE, UNSPECIFIED: ICD-10-CM

## 2020-05-18 DIAGNOSIS — D72.810 LYMPHOCYTOPENIA: ICD-10-CM

## 2020-05-18 PROCEDURE — 99213 OFFICE O/P EST LOW 20 MIN: CPT | Mod: CS,95

## 2020-05-18 RX ORDER — POTASSIUM CHLORIDE 1.5 G/1.58G
POWDER, FOR SOLUTION ORAL
Refills: 0 | Status: ACTIVE | COMMUNITY

## 2020-05-18 RX ORDER — RIVAROXABAN 10 MG/1
10 TABLET, FILM COATED ORAL
Refills: 0 | Status: ACTIVE | COMMUNITY

## 2020-05-18 RX ORDER — METFORMIN HYDROCHLORIDE 500 MG/1
500 TABLET, COATED ORAL
Refills: 0 | Status: ACTIVE | COMMUNITY

## 2020-05-18 RX ORDER — SULFAMETHOXAZOLE AND TRIMETHOPRIM 800; 160 MG/1; MG/1
800-160 TABLET ORAL
Refills: 0 | Status: ACTIVE | COMMUNITY

## 2020-05-18 RX ORDER — FENOFIBRIC ACID 135 MG/1
135 CAPSULE, DELAYED RELEASE ORAL
Refills: 0 | Status: ACTIVE | COMMUNITY

## 2020-05-18 RX ORDER — INSULIN GLARGINE 300 U/ML
INJECTION, SOLUTION SUBCUTANEOUS
Refills: 0 | Status: ACTIVE | COMMUNITY

## 2020-05-18 RX ORDER — LEVOTHYROXINE SODIUM 0.17 MG/1
TABLET ORAL
Refills: 0 | Status: ACTIVE | COMMUNITY

## 2020-05-18 RX ORDER — GABAPENTIN 300 MG/1
300 CAPSULE ORAL
Refills: 0 | Status: ACTIVE | COMMUNITY

## 2020-05-18 RX ORDER — PREDNISONE 20 MG/1
20 TABLET ORAL
Refills: 0 | Status: ACTIVE | COMMUNITY

## 2020-05-18 RX ORDER — DULOXETINE HYDROCHLORIDE 20 MG/1
20 CAPSULE, DELAYED RELEASE PELLETS ORAL
Refills: 0 | Status: ACTIVE | COMMUNITY

## 2020-05-18 RX ORDER — ALLOPURINOL 300 MG/1
300 TABLET ORAL
Refills: 0 | Status: ACTIVE | COMMUNITY

## 2020-05-18 RX ORDER — OLANZAPINE 2.5 MG/1
2.5 TABLET, FILM COATED ORAL
Refills: 0 | Status: ACTIVE | COMMUNITY

## 2020-05-18 RX ORDER — PANTOPRAZOLE SODIUM 40 MG/1
40 TABLET, DELAYED RELEASE ORAL
Refills: 0 | Status: ACTIVE | COMMUNITY

## 2020-05-18 RX ORDER — ATENOLOL 25 MG/1
25 TABLET ORAL
Refills: 0 | Status: ACTIVE | COMMUNITY

## 2020-05-18 RX ORDER — INSULIN LISPRO 100 [IU]/ML
INJECTION, SOLUTION INTRAVENOUS; SUBCUTANEOUS
Refills: 0 | Status: ACTIVE | COMMUNITY

## 2020-05-18 NOTE — HISTORY OF PRESENT ILLNESS
[TextBox_4] : 5/18/20: Video follow up for this 74yo man admitted [facility] 4/21-5/10 for respiratory failure due to Covid, not intubated but required HFNC and proning in ICU. Got HCQ, steroids and toci, and was discharged on prednisone 40mg and Bactrim due to steroid responsiveness in hospital, and on 2L NC. Also sent out on Xarelto ppx with no evidence of clot in hospital.\par \par On chat today he feels "great" with improving wellbeing over the past 3 days. He notes sats in mid 90s on room air at rest and same on 2L w exertion. He feels that he probably does not need oxygen. He's far better than on discharge. He is on prednisone 40mg and Xarelto ppx. He had a home nurse in today to check a wound or skin lesion on his backside.\par \par

## 2020-05-18 NOTE — CONSULT LETTER
[Courtesy Letter:] : I had the pleasure of seeing your patient, [unfilled], in my office today. [Dear  ___] : Dear  [unfilled], [Please see my note below.] : Please see my note below. [Consult Closing:] : Thank you very much for allowing me to participate in the care of this patient.  If you have any questions, please do not hesitate to contact me. [Sincerely,] : Sincerely, [FreeTextEntry2] : Mykel Jordan MD\par [address]\[place] [address] [FreeTextEntry3] : Chari Smith MD, FCCP\par

## 2020-05-18 NOTE — ASSESSMENT
[FreeTextEntry1] : Impression:\par Acute respiratory failure due to \par Covid-19 pneumonia\par \par Plan:\par Clearly improving.\par Remain on pred 40mg and Bactrim ppx and come in next week for sat check and CXR, and will determine what to do w steroids.\par Stay on ppx Xarelto for one month total.\par Call w problems.

## 2020-05-19 ENCOUNTER — TRANSCRIPTION ENCOUNTER (OUTPATIENT)
Age: 75
End: 2020-05-19

## 2020-05-19 ENCOUNTER — APPOINTMENT (OUTPATIENT)
Dept: INTERNAL MEDICINE | Facility: CLINIC | Age: 75
End: 2020-05-19

## 2020-05-19 ENCOUNTER — APPOINTMENT (OUTPATIENT)
Dept: HEART AND VASCULAR | Facility: CLINIC | Age: 75
End: 2020-05-19
Payer: MEDICARE

## 2020-05-19 DIAGNOSIS — Z86.39 PERSONAL HISTORY OF OTHER ENDOCRINE, NUTRITIONAL AND METABOLIC DISEASE: ICD-10-CM

## 2020-05-19 PROCEDURE — 99213 OFFICE O/P EST LOW 20 MIN: CPT | Mod: CS,95

## 2020-05-19 NOTE — ASSESSMENT
[FreeTextEntry1] : 75 M \par \par COVID-19 Pneumonia on xarelto 10mg for extended VTE Prophylaxis\par Negative DVT X 2 during hospitalization (4/27 and 5/1).  No CTA chest performed. \par CAD s/p remote PCI - stable \par HTN\par HLD\par IDDM\par \par Plan:\par - cont xarelto 10mg daily X 30 days\par - restart home aspirin 81mg.  Discussed not to take his prior regimen of two 81mg tabs.  \par - prednisone therapy per pulm Dr. Smith\par - cardiac care per outpatient cardiologist Dr. Groves.  Patient is requesting records be sent to his office.   \par - return for office visit in early June

## 2020-05-19 NOTE — REASON FOR VISIT
[Initial Evaluation] : an initial evaluation of [Peripheral Vascular Disease] : peripheral vascular disease [FreeTextEntry1] : \par \par

## 2020-05-19 NOTE — HISTORY OF PRESENT ILLNESS
[FreeTextEntry1] : Telehealth Encounter: verbal consent given by cami Siddiquipetra  43 at 11AM \par COVID-19 Admission [facility] -5/10/20\par \par 74 y/o M with CAD s/p PCI ~ 20 yr ago (cardiologist Dr. Groves at [facility]), HLD, HTN, IDDM, admitted for hypoxic respiratory failure 2/2 COVID pneumonia managed in ICU with HFNC and awake proning, sent home on prednisone taper, O2, and xarelto 10mg for extended VTE ppx. \par Venous duplex  and : no evidence of DVT\par No CTA performed during admission \par d-dimer peak 2300\par \par Doing well post dc.  No CP/SOB.  No leg pain or swelling.  Not using oxygen.  Sats mid 90s.  Lost > 30lbs during hospital admission. \par \par No hx VTE. No hx CHF.   \par

## 2020-05-29 ENCOUNTER — APPOINTMENT (OUTPATIENT)
Dept: PULMONOLOGY | Facility: CLINIC | Age: 75
End: 2020-05-29
Payer: MEDICARE

## 2020-05-29 VITALS
HEIGHT: 73 IN | TEMPERATURE: 98 F | BODY MASS INDEX: 22.26 KG/M2 | HEART RATE: 60 BPM | OXYGEN SATURATION: 97 % | DIASTOLIC BLOOD PRESSURE: 70 MMHG | WEIGHT: 168 LBS | SYSTOLIC BLOOD PRESSURE: 120 MMHG

## 2020-05-29 DIAGNOSIS — J96.00 ACUTE RESPIRATORY FAILURE, UNSPECIFIED WHETHER WITH HYPOXIA OR HYPERCAPNIA: ICD-10-CM

## 2020-05-29 PROCEDURE — 99214 OFFICE O/P EST MOD 30 MIN: CPT | Mod: CS,25

## 2020-05-29 PROCEDURE — 71046 X-RAY EXAM CHEST 2 VIEWS: CPT | Mod: CS

## 2020-05-29 RX ORDER — PREDNISONE 5 MG/1
5 TABLET ORAL
Qty: 5 | Refills: 0 | Status: ACTIVE | COMMUNITY
Start: 2020-05-29 | End: 1900-01-01

## 2020-05-29 NOTE — PHYSICAL EXAM
[No Acute Distress] : no acute distress [Normal S1, S2] : normal s1, s2 [Normal Rate/Rhythm] : normal rate/rhythm [Normal Appearance] : normal appearance [Clear to Auscultation Bilaterally] : clear to auscultation bilaterally [No Resp Distress] : no resp distress [No Murmurs] : no murmurs [Normal Affect] : normal affect [No Edema] : no edema

## 2020-05-29 NOTE — HISTORY OF PRESENT ILLNESS
[TextBox_4] : 5/18/20: Video follow up for this 74yo man admitted [facility] 4/21-5/10 for respiratory failure due to Covid, not intubated but required HFNC and proning in ICU. Got HCQ, steroids and toci, and was discharged on prednisone 40mg and Bactrim due to steroid responsiveness in hospital, and on 2L NC. Also sent out on Xarelto ppx with no evidence of clot in hospital.\par \par On chat today he feels "great" with improving wellbeing over the past 3 days. He notes sats in mid 90s on room air at rest and same on 2L w exertion. He feels that he probably does not need oxygen. He's far better than on discharge. He is on prednisone 40mg and Xarelto ppx. He had a home nurse in today to check a wound or skin lesion on his backside.\par \par 5/29/20: Here w wife. "100% neck up and 80% body" - recovery. Overall feels really good. Big appetite, has gained back 8-10 lbs of 30 lbs lost. Off oxygen x 8-9 days. He did have a fall shortly after going home, had 10/10 chest pain on L side and now down to 3/10. Cont on Xarelto + ASA 81. Blood glucose in 200-300 range, normally well controlled. On pred 40mg daily and Bactrim ppx.

## 2020-05-29 NOTE — ASSESSMENT
[FreeTextEntry1] : Data reviewed:\par \par PA/lat CXR in office 5/29/20: largely cleared, some infiltrate L upper lung zone\par \par Impression:\par Acute respiratory failure due to \par Covid-19 pneumonia\par \par Plan:\par Continues to improve.\par Will try to taper his steroids fairly quickly given the improvement and hyperglycemia: 30mg x 5, 20mg x 5, 10mg x 5, 5mg x 5. Given in writing. Call if problems. Stop Bactrim and protonix when stopping prednisone.\par Stay on ppx Xarelto for one month total.\par Call w problems. Come back in a month.

## 2020-05-29 NOTE — CONSULT LETTER
[Dear  ___] : Dear  [unfilled], [Courtesy Letter:] : I had the pleasure of seeing your patient, [unfilled], in my office today. [Consult Closing:] : Thank you very much for allowing me to participate in the care of this patient.  If you have any questions, please do not hesitate to contact me. [Sincerely,] : Sincerely, [Please see my note below.] : Please see my note below. [FreeTextEntry2] : Mykel Jordan MD\par [address]\[place] [address] [FreeTextEntry3] : Chari Smith MD, FCCP\par

## 2020-06-02 PROCEDURE — 84295 ASSAY OF SERUM SODIUM: CPT

## 2020-06-02 PROCEDURE — 84540 ASSAY OF URINE/UREA-N: CPT

## 2020-06-02 PROCEDURE — 85610 PROTHROMBIN TIME: CPT

## 2020-06-02 PROCEDURE — 84132 ASSAY OF SERUM POTASSIUM: CPT

## 2020-06-02 PROCEDURE — 84443 ASSAY THYROID STIM HORMONE: CPT

## 2020-06-02 PROCEDURE — 87635 SARS-COV-2 COVID-19 AMP PRB: CPT

## 2020-06-02 PROCEDURE — 85025 COMPLETE CBC W/AUTO DIFF WBC: CPT

## 2020-06-02 PROCEDURE — 87449 NOS EACH ORGANISM AG IA: CPT

## 2020-06-02 PROCEDURE — 82010 KETONE BODYS QUAN: CPT

## 2020-06-02 PROCEDURE — 87641 MR-STAPH DNA AMP PROBE: CPT

## 2020-06-02 PROCEDURE — 82962 GLUCOSE BLOOD TEST: CPT

## 2020-06-02 PROCEDURE — 86140 C-REACTIVE PROTEIN: CPT

## 2020-06-02 PROCEDURE — 83970 ASSAY OF PARATHORMONE: CPT

## 2020-06-02 PROCEDURE — 84100 ASSAY OF PHOSPHORUS: CPT

## 2020-06-02 PROCEDURE — 83930 ASSAY OF BLOOD OSMOLALITY: CPT

## 2020-06-02 PROCEDURE — 83735 ASSAY OF MAGNESIUM: CPT

## 2020-06-02 PROCEDURE — 85018 HEMOGLOBIN: CPT

## 2020-06-02 PROCEDURE — 76775 US EXAM ABDO BACK WALL LIM: CPT

## 2020-06-02 PROCEDURE — 82728 ASSAY OF FERRITIN: CPT

## 2020-06-02 PROCEDURE — 82803 BLOOD GASES ANY COMBINATION: CPT

## 2020-06-02 PROCEDURE — 84145 PROCALCITONIN (PCT): CPT

## 2020-06-02 PROCEDURE — 82550 ASSAY OF CK (CPK): CPT

## 2020-06-02 PROCEDURE — 81003 URINALYSIS AUTO W/O SCOPE: CPT

## 2020-06-02 PROCEDURE — 83605 ASSAY OF LACTIC ACID: CPT

## 2020-06-02 PROCEDURE — 86901 BLOOD TYPING SEROLOGIC RH(D): CPT

## 2020-06-02 PROCEDURE — 92526 ORAL FUNCTION THERAPY: CPT

## 2020-06-02 PROCEDURE — 83036 HEMOGLOBIN GLYCOSYLATED A1C: CPT

## 2020-06-02 PROCEDURE — 70450 CT HEAD/BRAIN W/O DYE: CPT

## 2020-06-02 PROCEDURE — 83935 ASSAY OF URINE OSMOLALITY: CPT

## 2020-06-02 PROCEDURE — 82330 ASSAY OF CALCIUM: CPT

## 2020-06-02 PROCEDURE — 84300 ASSAY OF URINE SODIUM: CPT

## 2020-06-02 PROCEDURE — 82310 ASSAY OF CALCIUM: CPT

## 2020-06-02 PROCEDURE — 97164 PT RE-EVAL EST PLAN CARE: CPT

## 2020-06-02 PROCEDURE — 97530 THERAPEUTIC ACTIVITIES: CPT

## 2020-06-02 PROCEDURE — 82955 ASSAY OF G6PD ENZYME: CPT

## 2020-06-02 PROCEDURE — 84484 ASSAY OF TROPONIN QUANT: CPT

## 2020-06-02 PROCEDURE — 97116 GAIT TRAINING THERAPY: CPT

## 2020-06-02 PROCEDURE — 80053 COMPREHEN METABOLIC PANEL: CPT

## 2020-06-02 PROCEDURE — 71045 X-RAY EXAM CHEST 1 VIEW: CPT

## 2020-06-02 PROCEDURE — 87631 RESP VIRUS 3-5 TARGETS: CPT

## 2020-06-02 PROCEDURE — 81001 URINALYSIS AUTO W/SCOPE: CPT

## 2020-06-02 PROCEDURE — 96375 TX/PRO/DX INJ NEW DRUG ADDON: CPT

## 2020-06-02 PROCEDURE — 36430 TRANSFUSION BLD/BLD COMPNT: CPT

## 2020-06-02 PROCEDURE — 96374 THER/PROPH/DIAG INJ IV PUSH: CPT

## 2020-06-02 PROCEDURE — 94660 CPAP INITIATION&MGMT: CPT

## 2020-06-02 PROCEDURE — 85730 THROMBOPLASTIN TIME PARTIAL: CPT

## 2020-06-02 PROCEDURE — 80048 BASIC METABOLIC PNL TOTAL CA: CPT

## 2020-06-02 PROCEDURE — 85379 FIBRIN DEGRADATION QUANT: CPT

## 2020-06-02 PROCEDURE — 92610 EVALUATE SWALLOWING FUNCTION: CPT

## 2020-06-02 PROCEDURE — 71250 CT THORAX DX C-: CPT

## 2020-06-02 PROCEDURE — 97161 PT EVAL LOW COMPLEX 20 MIN: CPT

## 2020-06-02 PROCEDURE — 82306 VITAMIN D 25 HYDROXY: CPT

## 2020-06-02 PROCEDURE — 93005 ELECTROCARDIOGRAM TRACING: CPT | Mod: 76

## 2020-06-02 PROCEDURE — 93970 EXTREMITY STUDY: CPT

## 2020-06-02 PROCEDURE — 82570 ASSAY OF URINE CREATININE: CPT

## 2020-06-02 PROCEDURE — 86850 RBC ANTIBODY SCREEN: CPT

## 2020-06-02 PROCEDURE — 86480 TB TEST CELL IMMUN MEASURE: CPT

## 2020-06-02 PROCEDURE — 99285 EMERGENCY DEPT VISIT HI MDM: CPT | Mod: 25

## 2020-06-02 PROCEDURE — 87040 BLOOD CULTURE FOR BACTERIA: CPT

## 2020-06-02 PROCEDURE — 36415 COLL VENOUS BLD VENIPUNCTURE: CPT

## 2020-06-26 ENCOUNTER — APPOINTMENT (OUTPATIENT)
Dept: PULMONOLOGY | Facility: CLINIC | Age: 75
End: 2020-06-26
Payer: MEDICARE

## 2020-06-26 VITALS
HEIGHT: 73 IN | OXYGEN SATURATION: 98 % | SYSTOLIC BLOOD PRESSURE: 118 MMHG | DIASTOLIC BLOOD PRESSURE: 68 MMHG | HEART RATE: 68 BPM | BODY MASS INDEX: 23.59 KG/M2 | TEMPERATURE: 96.2 F | WEIGHT: 178 LBS

## 2020-06-26 DIAGNOSIS — Z99.89 OBSTRUCTIVE SLEEP APNEA (ADULT) (PEDIATRIC): ICD-10-CM

## 2020-06-26 DIAGNOSIS — G47.33 OBSTRUCTIVE SLEEP APNEA (ADULT) (PEDIATRIC): ICD-10-CM

## 2020-06-26 PROCEDURE — 71046 X-RAY EXAM CHEST 2 VIEWS: CPT | Mod: CS

## 2020-06-26 PROCEDURE — 99213 OFFICE O/P EST LOW 20 MIN: CPT | Mod: CS,25

## 2020-06-26 NOTE — PHYSICAL EXAM
[No Acute Distress] : no acute distress [Normal Rate/Rhythm] : normal rate/rhythm [Normal S1, S2] : normal s1, s2 [No Murmurs] : no murmurs [No Resp Distress] : no resp distress [Clear to Auscultation Bilaterally] : clear to auscultation bilaterally

## 2020-07-21 NOTE — CONSULT LETTER
[Dear  ___] : Dear  [unfilled], [Courtesy Letter:] : I had the pleasure of seeing your patient, [unfilled], in my office today. [Please see my note below.] : Please see my note below. [Consult Closing:] : Thank you very much for allowing me to participate in the care of this patient.  If you have any questions, please do not hesitate to contact me. [Sincerely,] : Sincerely, [FreeTextEntry3] : Chari Smith MD, FCCP\par  [FreeTextEntry2] : Mykel Jordan MD\par [address]\[place] [address]

## 2020-07-21 NOTE — HISTORY OF PRESENT ILLNESS
[TextBox_4] : 5/18/20: Video follow up for this 74yo man admitted [facility] 4/21-5/10 for respiratory failure due to Covid, not intubated but required HFNC and proning in ICU. Got HCQ, steroids and toci, and was discharged on prednisone 40mg and Bactrim due to steroid responsiveness in hospital, and on 2L NC. Also sent out on Xarelto ppx with no evidence of clot in hospital.\par \par On chat today he feels "great" with improving wellbeing over the past 3 days. He notes sats in mid 90s on room air at rest and same on 2L w exertion. He feels that he probably does not need oxygen. He's far better than on discharge. He is on prednisone 40mg and Xarelto ppx. He had a home nurse in today to check a wound or skin lesion on his backside.\par \par 5/29/20: Here w wife. "100% neck up and 80% body" - recovery. Overall feels really good. Big appetite, has gained back 8-10 lbs of 30 lbs lost. Off oxygen x 8-9 days. He did have a fall shortly after going home, had 10/10 chest pain on L side and now down to 3/10. Cont on Xarelto + ASA 81. Blood glucose in 200-300 range, normally well controlled. On pred 40mg daily and Bactrim ppx.\par \par 6/26/20: He's off the prednisone. He had no problems. But his mood and sleep got better. His wife thinks his stamina is still down a bit. Off protonix, off Bactrim. Blood sugar better but still with highs and lows. Seeing endo in a couple of weeks. Derm for rash. Off Xarelto.

## 2020-07-21 NOTE — ASSESSMENT
[FreeTextEntry1] : Data reviewed:\par \par PA/lat CXR in office 6/26/20: largely cleared w residual scarring MARTINEZ\par \par Impression:\par Acute respiratory failure due to \par Covid-19 pneumonia\par FERNANDO on CPAP\par \par Plan:\par He's doing beautifully. No changes.\par I will assume care for his FERNANDO. He will provide the outside studies as a starting point.\par --\par PSG [facility] 4/16/19 (190 lbs): FERNANDO, severe when supine (overall AHI 35, supine AHI 47), PLMS\par Allen sat 88% and only mild FERNANDO by AASM scoring system. He was prescribed APAP 4-10 cwp.

## 2020-07-29 ENCOUNTER — APPOINTMENT (OUTPATIENT)
Dept: PULMONOLOGY | Facility: CLINIC | Age: 75
End: 2020-07-29
Payer: MEDICARE

## 2020-07-29 VITALS
TEMPERATURE: 98.2 F | WEIGHT: 182 LBS | DIASTOLIC BLOOD PRESSURE: 60 MMHG | HEART RATE: 78 BPM | OXYGEN SATURATION: 98 % | HEIGHT: 73 IN | BODY MASS INDEX: 24.12 KG/M2 | SYSTOLIC BLOOD PRESSURE: 106 MMHG

## 2020-07-29 DIAGNOSIS — Z86.69 PERSONAL HISTORY OF OTHER DISEASES OF THE NERVOUS SYSTEM AND SENSE ORGANS: ICD-10-CM

## 2020-07-29 DIAGNOSIS — U07.1 COVID-19: ICD-10-CM

## 2020-07-29 DIAGNOSIS — J12.82 COVID-19: ICD-10-CM

## 2020-07-29 PROCEDURE — 99214 OFFICE O/P EST MOD 30 MIN: CPT | Mod: CS,GC

## 2020-07-29 NOTE — HISTORY OF PRESENT ILLNESS
[TextBox_4] : 5/18/20: Video follow up for this 76yo man admitted [facility] 4/21-5/10 for respiratory failure due to Covid, not intubated but required HFNC and proning in ICU. Got HCQ, steroids and toci, and was discharged on prednisone 40mg and Bactrim due to steroid responsiveness in hospital, and on 2L NC. Also sent out on Xarelto ppx with no evidence of clot in hospital.\par \par On chat today he feels "great" with improving wellbeing over the past 3 days. He notes sats in mid 90s on room air at rest and same on 2L w exertion. He feels that he probably does not need oxygen. He's far better than on discharge. He is on prednisone 40mg and Xarelto ppx. He had a home nurse in today to check a wound or skin lesion on his backside.\par \par 5/29/20: Here w wife. "100% neck up and 80% body" - recovery. Overall feels really good. Big appetite, has gained back 8-10 lbs of 30 lbs lost. Off oxygen x 8-9 days. He did have a fall shortly after going home, had 10/10 chest pain on L side and now down to 3/10. Cont on Xarelto + ASA 81. Blood glucose in 200-300 range, normally well controlled. On pred 40mg daily and Bactrim ppx.\par \par 6/26/20: He's off the prednisone. He had no problems. But his mood and sleep got better. His wife thinks his stamina is still down a bit. Off protonix, off Bactrim. Blood sugar better but still with highs and lows. Seeing endo in a couple of weeks. Derm for rash. Off Xarelto. \par \par 7/29/20 [Gaspar]: Non compliant with his CPAP due to increase in his pressure to 6 and contact dermatitis. Only complaint while not using his CPAP is that he has begun to snore again. No significant day time drowsiness, is sleeping well apart from nocturnal awakenings to void. His breathing has improved and he is no longer dyspneic. He is working with physical therapy and working to improve his aerobic capacity. He has no pulmonary complaints at this time.

## 2020-07-29 NOTE — CONSULT LETTER
[Please see my note below.] : Please see my note below. [Courtesy Letter:] : I had the pleasure of seeing your patient, [unfilled], in my office today. [Dear  ___] : Dear  [unfilled], [Consult Closing:] : Thank you very much for allowing me to participate in the care of this patient.  If you have any questions, please do not hesitate to contact me. [Sincerely,] : Sincerely, [FreeTextEntry2] : Mykel Jordan MD\par [address]\[place] [address] [FreeTextEntry3] : Chari Smith MD, FCCP\par

## 2020-07-29 NOTE — ASSESSMENT
[FreeTextEntry1] : Data reviewed:\par \par PA/lat CXR in office 6/26/20: largely cleared w residual scarring MARTINEZ\par PSG [facility] 4/16/19 (190 lbs): FERNANDO, severe when supine (overall AHI 35, supine AHI 47), PLMS\par Allen sat 88% and only mild FERNANDO by AASM scoring system. He was prescribed APAP 4-10 cwp. \par \par Impression:\par Acute respiratory failure due to Covid-19 pneumonia, recovered\par FERNANDO on CPAP\par \par Plan: \par - Patient has improved from a pulmonary perspective with regards to his covid 19 pneumonia. He has no shortness of breath and is improving his exercise capacity with physical therapy. No current complaints, has completed his prednisone and xarelto prophylaxis. \par - With regards to his FERNANDO he has been non compliant with his CPAP due to pressure related issues. He does not report significant symptoms and his previous studies are not congruent - mild to severe based on classification. His symptoms suggest at most mild FERNANDO. Discussed continuation versus d/c with joint decision to resume at lower pressure of 4 cm h2o to target snoring as it is disruptive to his and his wife's sleep. Should he not benefit from it we discussed discontinuation of his CPAP. \par - f/u in 1 year or sooner as needed \par --\par Attending Addendum\par \par Agree w above. Based on symptoms and PSG report, I'm not sure he really needs the CPAP, but the snoring is a major problem for him and his wife and he was comfortable at 4cm H2O. Will change this for him. He can follow annually or for any problems.

## 2020-07-29 NOTE — PHYSICAL EXAM
[Normal Appearance] : normal appearance [No Acute Distress] : no acute distress [Normal Oropharynx] : normal oropharynx [No Neck Mass] : no neck mass [Normal Rate/Rhythm] : normal rate/rhythm [Normal S1, S2] : normal s1, s2 [No Murmurs] : no murmurs [No Resp Distress] : no resp distress [Clear to Auscultation Bilaterally] : clear to auscultation bilaterally

## 2020-07-29 NOTE — REVIEW OF SYSTEMS
[Fever] : no fever [Fatigue] : no fatigue [Chills] : no chills [Cough] : no cough [Chest Tightness] : no chest tightness [Dyspnea] : no dyspnea [Sputum] : no sputum [Chest Discomfort] : no chest discomfort [Orthopnea] : no orthopnea [Confusion] : no confusion [Headache] : no headache

## 2020-11-07 NOTE — SWALLOW BEDSIDE ASSESSMENT ADULT - SWALLOW EVAL: RECOMMENDED FEEDING/EATING TECHNIQUES
07-Nov-2020 05:01 small sips/bites/position upright (90 degrees)/Remind Pt to keep head in a neutral position with PO intake; break from eating with labored breathing./oral hygiene

## 2021-02-17 ENCOUNTER — APPOINTMENT (OUTPATIENT)
Dept: PULMONOLOGY | Facility: CLINIC | Age: 76
End: 2021-02-17
Payer: MEDICARE

## 2021-02-17 VITALS
SYSTOLIC BLOOD PRESSURE: 118 MMHG | BODY MASS INDEX: 24.78 KG/M2 | WEIGHT: 187 LBS | HEIGHT: 73 IN | DIASTOLIC BLOOD PRESSURE: 66 MMHG | HEART RATE: 65 BPM | TEMPERATURE: 97.7 F | OXYGEN SATURATION: 95 %

## 2021-02-17 DIAGNOSIS — R06.00 DYSPNEA, UNSPECIFIED: ICD-10-CM

## 2021-02-17 PROCEDURE — 94060 EVALUATION OF WHEEZING: CPT

## 2021-02-17 PROCEDURE — 99213 OFFICE O/P EST LOW 20 MIN: CPT | Mod: 25,GC

## 2021-02-17 PROCEDURE — 71046 X-RAY EXAM CHEST 2 VIEWS: CPT | Mod: GC

## 2021-02-17 PROCEDURE — 94729 DIFFUSING CAPACITY: CPT

## 2021-02-17 PROCEDURE — 94727 GAS DIL/WSHOT DETER LNG VOL: CPT

## 2021-02-17 NOTE — PHYSICAL EXAM
[No Acute Distress] : no acute distress [Normal Oropharynx] : normal oropharynx [Normal Appearance] : normal appearance [No Neck Mass] : no neck mass [Normal Rate/Rhythm] : normal rate/rhythm [Normal S1, S2] : normal s1, s2 [No Murmurs] : no murmurs [No Resp Distress] : no resp distress [Clear to Auscultation Bilaterally] : clear to auscultation bilaterally [Benign] : benign

## 2021-03-20 ENCOUNTER — LABORATORY RESULT (OUTPATIENT)
Age: 76
End: 2021-03-20

## 2021-03-23 ENCOUNTER — OUTPATIENT (OUTPATIENT)
Dept: OUTPATIENT SERVICES | Facility: HOSPITAL | Age: 76
LOS: 1 days | End: 2021-03-23
Payer: MEDICARE

## 2021-03-23 DIAGNOSIS — R06.00 DYSPNEA, UNSPECIFIED: ICD-10-CM

## 2021-03-23 PROCEDURE — 94621 CARDIOPULM EXERCISE TESTING: CPT

## 2021-03-23 PROCEDURE — 94621 CARDIOPULM EXERCISE TESTING: CPT | Mod: 26

## 2021-03-23 RX ORDER — ALBUTEROL 90 UG/1
2 AEROSOL, METERED ORAL ONCE
Refills: 0 | Status: DISCONTINUED | OUTPATIENT
Start: 2021-03-23 | End: 2021-04-07

## 2021-03-29 NOTE — REVIEW OF SYSTEMS
[Dyspnea] : dyspnea [Negative] : Musculoskeletal [Cough] : no cough [Hemoptysis] : no hemoptysis [Chest Tightness] : no chest tightness [Sputum] : no sputum [Pleuritic Pain] : no pleuritic pain [SOB on Exertion] : no sob on exertion

## 2021-03-29 NOTE — CONSULT LETTER
[Dear  ___] : Dear  [unfilled], [Courtesy Letter:] : I had the pleasure of seeing your patient, [unfilled], in my office today. [Please see my note below.] : Please see my note below. [Consult Closing:] : Thank you very much for allowing me to participate in the care of this patient.  If you have any questions, please do not hesitate to contact me. [Sincerely,] : Sincerely, [FreeTextEntry2] : Mykel Jordan MD\par [address]\[place] [address] [FreeTextEntry3] : Chari Smith MD, FCCP\par

## 2021-03-29 NOTE — ASSESSMENT
[FreeTextEntry1] : Data reviewed:\par \par PA/lat CXR in office 6/26/20: largely cleared w residual scarring MARTINEZ\par PA/lat CXR office 2/17/21: wnl, clear \par PSG [facility] 4/16/19 (190 lbs): FERNANDO, severe when supine (overall AHI 35, supine AHI 47), PLMS\par Allen sat 88% and only mild FERNANDO by AASM scoring system. He was prescribed APAP 4-10 cwp. \par \par PFT 2/17/21: mild restriction, no sig BD response,  DLCO 69%\par \par Impression:\par Acute respiratory failure due to Covid-19 pneumonia, recovered\par Dyspnea on exertion \par FERNANDO declining CPAP\par \par Plan: \par - Patient has persistent dyspnea with episodes of fatigue. He has received a work up with his cardiologist and performed a stress echocardiogram which is within normal limits. He has seen his primary care doctor and has had blood work performed, we do not have copies, which presumably tested for anemia and thyroid dysfunction. His office chest xray and pulmonary function testing both are not significantly changed. Do not believe that his symptoms are primarily pulmonary but will test further with CPET. \par --\par Attending Addendum\par \par Here for dyspnea/fatigue w activities, predated Covid. I don't think anything is wrong. He's able to exercise at the gym. His CXR is clear and PFT only mildly abnormal. Will refer for CPET.\par --\par CPET 3/23/21: mildly reduced capacity but no specific limitation, elevated VE/VCO2 at AT but recent echo was normal; did not feel dyspnea limited him on the bike, still only complains of dyspnea w a short walk - sounds more perceptional than physiologic given preserved exercise, reassurance, spoke to him 3/29

## 2021-03-29 NOTE — HISTORY OF PRESENT ILLNESS
[TextBox_4] : 5/18/20: Video follow up for this 74yo man admitted [facility] 4/21-5/10 for respiratory failure due to Covid, not intubated but required HFNC and proning in ICU. Got HCQ, steroids and toci, and was discharged on prednisone 40mg and Bactrim due to steroid responsiveness in hospital, and on 2L NC. Also sent out on Xarelto ppx with no evidence of clot in hospital.\par \par On chat today he feels "great" with improving wellbeing over the past 3 days. He notes sats in mid 90s on room air at rest and same on 2L w exertion. He feels that he probably does not need oxygen. He's far better than on discharge. He is on prednisone 40mg and Xarelto ppx. He had a home nurse in today to check a wound or skin lesion on his backside.\par \par 5/29/20: Here w wife. "100% neck up and 80% body" - recovery. Overall feels really good. Big appetite, has gained back 8-10 lbs of 30 lbs lost. Off oxygen x 8-9 days. He did have a fall shortly after going home, had 10/10 chest pain on L side and now down to 3/10. Cont on Xarelto + ASA 81. Blood glucose in 200-300 range, normally well controlled. On pred 40mg daily and Bactrim ppx.\par \par 6/26/20: He's off the prednisone. He had no problems. But his mood and sleep got better. His wife thinks his stamina is still down a bit. Off protonix, off Bactrim. Blood sugar better but still with highs and lows. Seeing endo in a couple of weeks. Derm for rash. Off Xarelto. \par \par 7/29/20 [Hubert]: Non compliant with his CPAP due to increase in his pressure to 6 and contact dermatitis. Only complaint while not using his CPAP is that he has begun to snore again. No significant day time drowsiness, is sleeping well apart from nocturnal awakenings to void. His breathing has improved and he is no longer dyspneic. He is working with physical therapy and working to improve his aerobic capacity. He has no pulmonary complaints at this time. \par \par 2/17/21 Hubert: Sent back by Dr Jordan for dyspnea. Patient reports that he intermittently feels exertional dyspnea and fatigue following exercise. These symptoms do not occur every time. He is able to exercise for 30-40 minutes on a recumbent bicycle to the point of sweating w/o significant dyspnea, chest tightness or pressure. He had COVID 19 which exacerbated the severity of these symptoms but they pre existed his covid diagnosis. The symptoms are not life altering and described primarily as bothersome. He wishes that he could participate in his daily activities without feeling dyspneic.

## 2021-05-20 ENCOUNTER — EMERGENCY (EMERGENCY)
Facility: HOSPITAL | Age: 76
LOS: 1 days | Discharge: ANOTHER HOSPITAL | End: 2021-05-20
Attending: EMERGENCY MEDICINE | Admitting: EMERGENCY MEDICINE
Payer: MEDICARE

## 2021-05-20 VITALS
OXYGEN SATURATION: 97 % | DIASTOLIC BLOOD PRESSURE: 74 MMHG | HEIGHT: 72 IN | HEART RATE: 65 BPM | SYSTOLIC BLOOD PRESSURE: 124 MMHG | TEMPERATURE: 98 F | RESPIRATION RATE: 18 BRPM

## 2021-05-20 DIAGNOSIS — L76.22 POSTPROCEDURAL HEMORRHAGE OF SKIN AND SUBCUTANEOUS TISSUE FOLLOWING OTHER PROCEDURE: ICD-10-CM

## 2021-05-20 DIAGNOSIS — Z79.01 LONG TERM (CURRENT) USE OF ANTICOAGULANTS: ICD-10-CM

## 2021-05-20 DIAGNOSIS — E78.5 HYPERLIPIDEMIA, UNSPECIFIED: ICD-10-CM
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
## 2021-05-20 DIAGNOSIS — Z20.822 CONTACT WITH AND (SUSPECTED) EXPOSURE TO COVID-19: ICD-10-CM

## 2021-05-20 DIAGNOSIS — E11.9 TYPE 2 DIABETES MELLITUS WITHOUT COMPLICATIONS: ICD-10-CM

## 2021-05-20 DIAGNOSIS — T81.9XXA UNSPECIFIED COMPLICATION OF PROCEDURE, INITIAL ENCOUNTER: ICD-10-CM

## 2021-05-20 DIAGNOSIS — Z79.84 LONG TERM (CURRENT) USE OF ORAL HYPOGLYCEMIC DRUGS: ICD-10-CM
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
## 2021-05-20 DIAGNOSIS — Z79.82 LONG TERM (CURRENT) USE OF ASPIRIN: ICD-10-CM

## 2021-05-20 PROCEDURE — 99284 EMERGENCY DEPT VISIT MOD MDM: CPT | Mod: CS

## 2021-05-20 NOTE — ED ADULT TRIAGE NOTE - CHIEF COMPLAINT QUOTE
Pt BIBA c/o bleeding to incision area on R groin post cardiac stent placement. Was discharged from [facility] today around 5pm, states was in the bathroom when began noticing bleeding to incision area with clots. Takes aspirin daily. Pt BIBA c/o bleeding to incision area on R groin post cardiac stent placement. Was discharged from [facility] today around 5pm, states was in the bathroom when began noticing bleeding to incision area with clots. Takes aspirin daily, +bleeding to site.

## 2021-05-21 VITALS
TEMPERATURE: 98 F | HEART RATE: 61 BPM | SYSTOLIC BLOOD PRESSURE: 122 MMHG | RESPIRATION RATE: 18 BRPM | DIASTOLIC BLOOD PRESSURE: 81 MMHG | OXYGEN SATURATION: 97 %

## 2021-05-21 LAB
ANION GAP SERPL CALC-SCNC: 8 MMOL/L — LOW (ref 9–16)
APTT BLD: 40.4 SEC — HIGH (ref 27.5–35.5)
BUN SERPL-MCNC: 34 MG/DL — HIGH (ref 7–23)
CALCIUM SERPL-MCNC: 9.4 MG/DL — SIGNIFICANT CHANGE UP (ref 8.5–10.5)
CHLORIDE SERPL-SCNC: 105 MMOL/L — SIGNIFICANT CHANGE UP (ref 96–108)
CO2 SERPL-SCNC: 27 MMOL/L — SIGNIFICANT CHANGE UP (ref 22–31)
CREAT SERPL-MCNC: 1.45 MG/DL — HIGH (ref 0.5–1.3)
GLUCOSE SERPL-MCNC: 110 MG/DL — HIGH (ref 70–99)
HCT VFR BLD CALC: 40.9 % — SIGNIFICANT CHANGE UP (ref 39–50)
HGB BLD-MCNC: 14.1 G/DL — SIGNIFICANT CHANGE UP (ref 13–17)
INR BLD: 1.14 — SIGNIFICANT CHANGE UP (ref 0.88–1.16)
MCHC RBC-ENTMCNC: 32.5 PG — SIGNIFICANT CHANGE UP (ref 27–34)
MCHC RBC-ENTMCNC: 34.5 GM/DL — SIGNIFICANT CHANGE UP (ref 32–36)
MCV RBC AUTO: 94.2 FL — SIGNIFICANT CHANGE UP (ref 80–100)
NRBC # BLD: 0 /100 WBCS — SIGNIFICANT CHANGE UP (ref 0–0)
PLATELET # BLD AUTO: 299 K/UL — SIGNIFICANT CHANGE UP (ref 150–400)
POTASSIUM SERPL-MCNC: 4.3 MMOL/L — SIGNIFICANT CHANGE UP (ref 3.5–5.3)
POTASSIUM SERPL-SCNC: 4.3 MMOL/L — SIGNIFICANT CHANGE UP (ref 3.5–5.3)
PROTHROM AB SERPL-ACNC: 13.4 SEC — SIGNIFICANT CHANGE UP (ref 10.6–13.6)
RBC # BLD: 4.34 M/UL — SIGNIFICANT CHANGE UP (ref 4.2–5.8)
RBC # FLD: 14.6 % — HIGH (ref 10.3–14.5)
SARS-COV-2 RNA SPEC QL NAA+PROBE: SIGNIFICANT CHANGE UP
SODIUM SERPL-SCNC: 140 MMOL/L — SIGNIFICANT CHANGE UP (ref 132–145)
WBC # BLD: 9.35 K/UL — SIGNIFICANT CHANGE UP (ref 3.8–10.5)
WBC # FLD AUTO: 9.35 K/UL — SIGNIFICANT CHANGE UP (ref 3.8–10.5)

## 2021-05-21 NOTE — ED PROVIDER NOTE - CLINICAL SUMMARY MEDICAL DECISION MAKING FREE TEXT BOX
active bleeding noted to angio site, does not appear arterial, previous pressure dressing soaked, new dressing applied, does not appear to have any swelling or tenderness over the puncture site, no bruit/thrill, new dressing applied, will call to [facility] for transfer

## 2021-05-21 NOTE — ED PROVIDER NOTE - PROGRESS NOTE DETAILS
spoke w/ cardiac team at [facility], request ED to ED transfer, will consult.  spoke w/ ED Dr. Ferro at [facility].  (pt had procedure done there, prefer to go to [facility])

## 2021-05-21 NOTE — ED PROVIDER NOTE - OBJECTIVE STATEMENT
76 yom pw bleeding from angiogram site, R fem.  on asa, rec'd plavix today during procedure.  finished procedure around 5pm.  noted more bleeding at home tonight.  no cp/sob/LH.  no pain to groin.  per family, blood soaking thru pressure dressing.

## 2021-05-21 NOTE — ED ADULT NURSE NOTE - CHIEF COMPLAINT QUOTE
Pt BIBA c/o bleeding to incision area on R groin post cardiac stent placement. Was discharged from [facility] today around 5pm, states was in the bathroom when began noticing bleeding to incision area with clots. Takes aspirin daily, +bleeding to site.

## 2021-05-21 NOTE — ED PROVIDER NOTE - PHYSICAL EXAMINATION
Physical Exam  GEN: Awake, alert, non-toxic appearing, NCAT  EYES: full EOMI,  ENT: External inspection normal, normal voice,   HEAD: atraumatic  NECK: FROM neck, supple,   CV: no palpable swelling over R femoral region, no bruit/thrill,  RESP: no tachypnea, no hypoxia, no resp distress  SKIN: bleeding noted from femoral puncture site, does not appear arterial, no surrounding ecchymosis,

## 2021-05-21 NOTE — ED ADULT NURSE NOTE - OBJECTIVE STATEMENT
Pt. with pmhx of CAD, DM type 2, HLD, presents with R femoral groin bleeding. Pt. states he has stent replaced yesterday at [facility]; d/c yesterday at 5pm with no bleeding noted. Pt. stated he got up to go to the bathroom yesterday evening and noted bleeding and clots at angiogram site. As per family, pt. soaked through pressure dressings. Pt. has bleeding from right femoral puncture site. Pt. denies SOB, chest pain/palpitations, HA, dizziness, fever, chills, n/v/d or abdominal pain.

## 2021-05-21 NOTE — ED ADULT NURSE NOTE - CHPI ED NUR SYMPTOMS NEG
no chills/no drainage/no fever/no pain/no purulent drainage/no rectal pain/no redness/no vomiting no back pain/no chest pain/no chills/no congestion/no diaphoresis/no dizziness/no fever/no nausea/no shortness of breath/no syncope/no vomiting

## 2021-08-02 NOTE — H&P ADULT - PROBLEM SELECTOR PLAN 4
[facility] HOSPITALIST PROGRESS NOTE     Patient Identification:  Name:  Emily Segura  Age:  44 y.o.  Sex:  female  :  1976  MRN:  [id number]  Visit Number:  [id number]  ROOM: [address] [place] [address]     Primary Care Provider:  Deloris Kyle APRN    Length of stay:  4    Subjective        Chief Compliant Follow up for the sepsis an COVID 19 pnuemonia    Patient seen and examined this afternoon with SYLVIA Blair at the bedside via the telemedicine video. Feels better. Shortness of breath is improving, mostly with exertion. Has dry cough. Denies chest pain. Nausea resolved.   Denies abdominal pain, vomiting. Afebrile and no events overnight. On 2 L NC.       Objective     Current Hospital Meds:albuterol sulfate HFA, 2 puff, Inhalation, 4x Daily - RT  benzonatate, 100 mg, Oral, Q8H  dexamethasone, 6 mg, Oral, Daily   Or  dexamethasone, 6 mg, Intravenous, Daily  enoxaparin, 0.5 mg/kg, Subcutaneous, Daily  lactobacillus acidophilus, 1 capsule, Oral, Daily  levoFLOXacin, 750 mg, Oral, Q24H  milnacipran, 50 mg, Oral, Nightly  pantoprazole, 40 mg, Oral, Q AM  remdesivir, 100 mg, Intravenous, Q24H  sertraline, 50 mg, Oral, Nightly  sodium chloride, 10 mL, Intravenous, Q12H       ----------------------------------------------------------------------------------------------------------------------  Vital Signs:  Temp:  [96.2 °F (35.7 °C)-98.4 °F (36.9 °C)] 97.6 °F (36.4 °C)  Heart Rate:  [] 91  Resp:  [18-20] 18  BP: (110-130)/(68-78) 112/68  SpO2:  [94 %-97 %] 97 %  on  Flow (L/min):  [2] 2;   Device (Oxygen Therapy): nasal cannula  Body mass index is 33.46 kg/m².    Wt Readings from Last 3 Encounters:   21 85.7 kg (188 lb 14.4 oz)   21 85.3 kg (188 lb)   20 89.8 kg (198 lb)       Intake/Output Summary (Last 24 hours) at 2021 1801  Last data filed at 2021 1707  Gross per 24 hour   Intake 480 ml   Output 1150 ml   Net -670 ml     Diet  Regular  ----------------------------------------------------------------------------------------------------------------------  Physical exam:    This physical exam has been personally performed remotely in the unit aided by real-time audio/visual communication tools. RN Rossy present at bedside during this exam and assisted during exam. The use of a video visit has been reviewed with the patient and verbal informed consent has been obtained.     Physical Exam:  General: Patient appears awake, alert, and in no acute distress.  Head: Normocephalic, atraumatic  Eyes: EOMI. Conjunctivae and sclerae normal.  Ears: Ears appear intact with no abnormalities noted.   Neck: Trachea midline. No obvious JVD.  Heart: Tele reveals sinus rhythm  Lungs: Respirations appear to be regular, even and unlabored with no signs of respiratory distress. No audible wheezing.  Abdomen: No obvious abdominal distension.  MS: Muscle tone appears normal. No gross deformities.  Extremities: No clubbing, cyanosis or edema noted.  Skin: No visible bleeding, bruising, or rash.  Neurologic: Alert and oriented x3. No gross focal deficits.       ----------------------------------------------------------------------------------------------------------------------  ----------------------------------------------------------------------------------------------------------------------  Results from last 7 days   Lab Units 08/02/21  0146 08/01/21  0201 07/31/21  0458 07/29/21  2242 07/29/21  2228 07/29/21  1658 07/26/21 2034 07/26/21 2034   CRP mg/dL  --  4.38*  --  9.81*  --  9.99*   < > 4.46*   LACTATE mmol/L  --  1.2  --   --   --  1.3  --  1.2   WBC 10*3/mm3 3.86 4.67 6.24  --    < > 4.80   < > 4.12   HEMOGLOBIN g/dL 12.8 12.2 11.1*  --    < > 13.6   < > 14.9   HEMATOCRIT % 41.7 39.3 36.2  --    < > 42.8   < > 46.7*   MCV fL 89.5 89.7 90.5  --    < > 86.1   < > 87.5   MCHC g/dL 30.7* 31.0* 30.7*  --    < > 31.8   < > 31.9   PLATELETS 10*3/mm3 242 260  234  --    < > 183   < > 124*    < > = values in this interval not displayed.     Results from last 7 days   Lab Units 08/02/21  0146 08/01/21  0201 07/31/21  0458   SODIUM mmol/L 139 140 141   POTASSIUM mmol/L 4.1 3.8 3.4*   MAGNESIUM mg/dL 2.0 2.1 2.0   CHLORIDE mmol/L 104 107 107   CO2 mmol/L 22.7 23.7 23.3   BUN mg/dL 18 17 14   CREATININE mg/dL 0.69 0.71 0.77   PHOSPHORUS mg/dL 3.2 2.4* 3.0   EGFR IF NONAFRICN AM mL/min/1.73 92 89 81   CALCIUM mg/dL 8.6 8.5* 8.3*   GLUCOSE mg/dL 89 106* 103*   ALBUMIN g/dL 3.03* 2.89* 2.85*   BILIRUBIN mg/dL 0.4 0.3 0.2   ALK PHOS U/L 78 75 72   AST (SGOT) U/L 25 28 33*   ALT (SGPT) U/L 24 26 26   Estimated Creatinine Clearance: 106.8 mL/min (by C-G formula based on SCr of 0.69 mg/dL).  Results from last 7 days   Lab Units 07/26/21 2034   TROPONIN T ng/mL <0.010     No results found for: HGBA1C, POCGLU  No results found for: AMMONIA    Results from last 7 days   Lab Units 07/29/21 1947   NITRITE UA  Negative     Blood Culture   Date Value Ref Range Status   07/29/2021 No growth at 3 days  Preliminary   07/29/2021 No growth at 3 days  Preliminary   No results found for: RESPCXNo results found for: URINECXNo results found for: WOUNDCXNo results found for: BODYFLDCXNo results found for: STOOLCX  I have personally looked at the labs and they are summarized above.  ----------------------------------------------------------------------------------------------------------------------    Blood Culture   Date Value Ref Range Status   07/29/2021 No growth at 3 days  Preliminary         Imaging Results (Last 24 Hours)     ** No results found for the last 24 hours. **        I have personally reviewed the radiology images and read the final radiology report.    Assessment & Plan      Assessment:  Sepsis  B/L COVID 19 pneumonia with superimposed RLL Bacterial pneumonia  Acute hypoxic respiratory failure  Depression/Fibromyalgia  H/O PUD  Obesity, BMI 33.46        Plan:  Sepsis secondary  to B/L COVID 19 pneumonia and superimposed RLL Bacterial pneumonia, on remdesevir. On Levaquin, QTc normal.  On lactobacillus. Afebrile and VSS. CRP improving. Ferritin elevated. D-dimer improving. No leukocytosis. Res PCR, Strep penumo antigen and legionella antigen negative. Prelim blood cx are no growth. CTPE protocol with No evidence of a pulmonary embolus. Patchy bilateral airspace disease, likely Covid pneumonia, worse than on the previous exam. Cannot exclude superimposed bacterial pneumonia in the right lung base. CT images reviewed. Advised to do IS and side prone position. In enhanced droplet isolation.      Acute hypoxic respiratory failure. On 2 L NC currently. Continue with decadron. Will do a dose of IV lasix to improve the lung compliance. Midodrine for the BP support.      Lovenox sc daily for the DVT prophylaxis.   Protonix po for the GI prophylaxis.   Ambulating.      Management and the plans discussed in detail with the patient and nursing.         The patient is high risk due to the following diagnoses/reasons:  Severe sepsis, Severe B/L COVID 19 pneumonia, Acute hypoxic respiratory failure         Disposition Home once stable.     Lesa Ramirez MD  08/02/21  18:01 EDT   Noted Cr at 1.91, unknown baseline but noted improvement to 1.64 with 3L IVF  - urine lytes  -Hold home losartan in setting of LIZ  - avoid nephrotoxic agents

## 2021-10-06 PROBLEM — U07.1 PNEUMONIA DUE TO COVID-19 VIRUS: Status: ACTIVE | Noted: 2020-05-14

## 2022-11-23 NOTE — PROCEDURE NOTE - ESTIMATED BLOOD LOSS
Is This A New Presentation, Or A Follow-Up?: Skin Lesions How Severe Is Your Skin Lesion?: moderate Have Your Skin Lesions Been Treated?: not been treated Minimal

## 2023-02-06 ENCOUNTER — NON-APPOINTMENT (OUTPATIENT)
Age: 78
End: 2023-02-06

## 2023-02-06 PROBLEM — I25.10 ATHEROSCLEROTIC HEART DISEASE OF NATIVE CORONARY ARTERY WITHOUT ANGINA PECTORIS: Chronic | Status: ACTIVE | Noted: 2021-05-21

## 2023-02-06 PROBLEM — E78.5 HYPERLIPIDEMIA, UNSPECIFIED: Chronic | Status: ACTIVE | Noted: 2021-05-21

## 2023-02-22 ENCOUNTER — APPOINTMENT (OUTPATIENT)
Dept: OTOLARYNGOLOGY | Facility: CLINIC | Age: 78
End: 2023-02-22
Payer: MEDICARE

## 2023-02-22 VITALS
TEMPERATURE: 97.8 F | OXYGEN SATURATION: 95 % | SYSTOLIC BLOOD PRESSURE: 135 MMHG | BODY MASS INDEX: 22.93 KG/M2 | HEART RATE: 74 BPM | RESPIRATION RATE: 18 BRPM | HEIGHT: 73 IN | WEIGHT: 173 LBS | DIASTOLIC BLOOD PRESSURE: 73 MMHG

## 2023-02-22 DIAGNOSIS — Z78.9 OTHER SPECIFIED HEALTH STATUS: ICD-10-CM

## 2023-02-22 DIAGNOSIS — Z83.3 FAMILY HISTORY OF DIABETES MELLITUS: ICD-10-CM

## 2023-02-22 DIAGNOSIS — Z82.49 FAMILY HISTORY OF ISCHEMIC HEART DISEASE AND OTHER DISEASES OF THE CIRCULATORY SYSTEM: ICD-10-CM

## 2023-02-22 DIAGNOSIS — H61.20 IMPACTED CERUMEN, UNSPECIFIED EAR: ICD-10-CM

## 2023-02-22 PROCEDURE — 99202 OFFICE O/P NEW SF 15 MIN: CPT

## 2023-02-22 RX ORDER — ROSUVASTATIN CALCIUM 5 MG/1
TABLET, FILM COATED ORAL
Refills: 0 | Status: ACTIVE | COMMUNITY

## 2023-02-22 NOTE — HISTORY OF PRESENT ILLNESS
[de-identified] : 77 y/o M is presenting with b cerumen impaction. He wears hearing aids and saw his audiologist and was recently found to have b cerumen impaction r>l. He denies otalgia or drainage. He denies tinnitus or dizziness. He does not use qtips. Denies loud noise exposure. No FH or SH pertinent to cc. Nonsmoker.

## 2023-02-22 NOTE — PROCEDURE
[Cerumen Impaction] : Cerumen Impaction [Same] : same as the Pre Op Dx. [] : Removal of Cerumen [FreeTextEntry5] : b copious cerumen removed atraumatically with suction and hook r>l, r under microscope b Patricia nl

## 2023-02-22 NOTE — ASSESSMENT
[FreeTextEntry1] : b cerumen impaction \par -cerumen removed\par -ears felt better\par RTC in 6 months or sooner as needed\par

## 2023-02-22 NOTE — PHYSICAL EXAM
[de-identified] : b copious cerumen removed atraumatically with suction and hook r>l, r under microscope b Patricia nl  [Normal] : no nystagmus [de-identified] : gait steady

## 2023-04-19 ENCOUNTER — APPOINTMENT (OUTPATIENT)
Dept: OTOLARYNGOLOGY | Facility: CLINIC | Age: 78
End: 2023-04-19
Payer: MEDICARE

## 2023-04-19 PROCEDURE — G0268 REMOVAL OF IMPACTED WAX MD: CPT

## 2023-04-19 PROCEDURE — 92550 TYMPANOMETRY & REFLEX THRESH: CPT | Mod: 52

## 2023-04-19 PROCEDURE — 99214 OFFICE O/P EST MOD 30 MIN: CPT | Mod: 25

## 2023-04-19 PROCEDURE — 92557 COMPREHENSIVE HEARING TEST: CPT

## 2023-04-19 NOTE — PROCEDURE
[Cerumen Impaction] : Cerumen Impaction [Same] : same as the Pre Op Dx. [] : Removal of Cerumen [Posterior Lesion] : posterior lesion [Anterior rhinoscopy insufficient to account for symptoms] : anterior rhinoscopy insufficient to account for symptoms [Flexible Endoscope] : examined with the flexible endoscope [Serial Number: ___] : Serial Number: [unfilled] [FreeTextEntry6] : done for severe r otalgia to ro npx mass with referred pain thru e.t - clear [FreeTextEntry5] : b copious cerumen removed atraumatically with suction r>l, b TMs nl

## 2023-04-19 NOTE — HISTORY OF PRESENT ILLNESS
[de-identified] : 2 month followup visit for this 79 y/o M with h/o cerumen impaction is presenting with sore throat and r otalgia for the past week. He also had recent r eye infxn and was prescribed eye drops and steroid (he does not know the name but believes it was it was prednisone). He reports that sore throat and eye have improved, but he has r dull ear pain. He reports that it feels like he is on an airplane and he cannot unclog his r ear. In the office he could not insufflate his r ear. He denies recent uri/ flu/ COVID-19 infxn. He is also c/o dizziness for the past week. He reports he is dizzy only when he walks. He has had this in the past and spoke with his  provider about this, who he said told him he may need an mri of his head, but he did not arrange for it or for neuro eval..

## 2023-04-19 NOTE — ASSESSMENT
[FreeTextEntry1] : 1. b cerumen impaction \par -cerumen removed\par -ears felt better\par -avoid qtip usage\par 2. r otalgia likely d/t TMJ\par soft diet\par advised to see his dentist - he agreed\par 3. subacute disequilibrium (over a week)\par mri/doppler ordered\par rtc with mri\par 4.snhl - is a HA user already\par \par

## 2023-04-19 NOTE — PHYSICAL EXAM
[de-identified] : b TMJ r>l he is acitvely bruxing in office [de-identified] : b copious cerumen removed atraumatically with suction r>l, b TMs nl  [Nasal Endoscopy Performed] : nasal endoscopy was performed, see procedure section for findings [Normal] : no nystagmus [de-identified] : gait steady

## 2023-04-21 RX ORDER — FLUTICASONE PROPIONATE 50 UG/1
50 SPRAY, METERED NASAL DAILY
Qty: 1 | Refills: 2 | Status: ACTIVE | COMMUNITY
Start: 2023-04-21 | End: 1900-01-01

## 2023-04-21 RX ORDER — AMOXICILLIN AND CLAVULANATE POTASSIUM 875; 125 MG/1; MG/1
875-125 TABLET, COATED ORAL
Qty: 20 | Refills: 0 | Status: ACTIVE | COMMUNITY
Start: 2023-04-21 | End: 1900-01-01

## 2023-04-22 ENCOUNTER — RX RENEWAL (OUTPATIENT)
Age: 78
End: 2023-04-22

## 2023-04-25 ENCOUNTER — APPOINTMENT (OUTPATIENT)
Dept: OTOLARYNGOLOGY | Facility: CLINIC | Age: 78
End: 2023-04-25
Payer: MEDICARE

## 2023-04-25 VITALS
BODY MASS INDEX: 22.93 KG/M2 | HEIGHT: 73 IN | HEART RATE: 91 BPM | SYSTOLIC BLOOD PRESSURE: 121 MMHG | TEMPERATURE: 98 F | DIASTOLIC BLOOD PRESSURE: 72 MMHG | OXYGEN SATURATION: 99 % | WEIGHT: 173 LBS

## 2023-04-25 DIAGNOSIS — H92.01 OTALGIA, RIGHT EAR: ICD-10-CM

## 2023-04-25 DIAGNOSIS — R42 DIZZINESS AND GIDDINESS: ICD-10-CM

## 2023-04-25 PROCEDURE — 69420 INCISION OF EARDRUM: CPT | Mod: RT

## 2023-04-25 PROCEDURE — 99214 OFFICE O/P EST MOD 30 MIN: CPT | Mod: 25

## 2023-04-25 RX ORDER — OFLOXACIN OTIC 3 MG/ML
0.3 SOLUTION AURICULAR (OTIC) TWICE DAILY
Qty: 10 | Refills: 0 | Status: ACTIVE | COMMUNITY
Start: 2023-04-25 | End: 1900-01-01

## 2023-04-26 NOTE — HISTORY OF PRESENT ILLNESS
[de-identified] : 6 day followup visit for this 79 y/o M with r otalgia and disequilibrium. He is here to review MRI/ Doppler.  He was prescribed augmentin and is using Flonase for r mastoid opacification identified on MRI 4 d ago. He feels his r otalgia is about the same as last visit. He describes it as pressure and it is intolerable.  He has had no further episodes of dizziness since last visit.

## 2023-04-26 NOTE — ASSESSMENT
[FreeTextEntry1] : 1. r otalgia likely d/t TMJ, r ETD, r don\par -aleve BID x 10 days if pt can tolerate\par -soft diet\par -avoid bruxism and chewing gum\par -followup with dentist for mouth guard - he agreed\par -MRI showed r mastoid opacification, arachnoid cyst -images and report reviewed with pt \par -continue augmentin until done and Flonase\par -discussed risks, benefits, alts to myringotomy vs pe tube\par -pt wished to proceed \par -done\par -copious drainage suctioned\par -pt noticed immediate relief \par -dep\par -ofloxacin x 5 days\par -recommended followup with neurologist for mri findings he was concerned about and disequilibrium- he said he had one\par RTC in 1 week to recheck ear; asked to call sooner for any issues; can't get vng with acute myringotomy and don

## 2023-04-26 NOTE — PROCEDURE
[Risk and Benefits Discussed] : The purpose, risks, discomforts, benefits and alternatives of the procedure have been explained to the patient including no treatment. [Same] : same as the Pre Op Dx. [Cerumen Impaction] : Cerumen Impaction [] : Otitis Media w/Effusion [FreeTextEntry4] : phenol  [FreeTextEntry5] : r myringotomy performed atraumatically, copious drainage suctioned  [FreeTextEntry6] : r scant cerumen removed atraumatically, r don

## 2023-04-26 NOTE — PHYSICAL EXAM
[Normal] : no nystagmus [de-identified] : l nl, r scant cerumen removed atraumatically, r don [de-identified] : r>l clenching [de-identified] : gait steady

## 2023-04-26 NOTE — DATA REVIEWED
[de-identified] : MRI showed r mastoid opacification, arachnoid cyst -images and report reviewed with pt \par doppler not remarkable - reviewed with pt on statin

## 2023-05-02 ENCOUNTER — APPOINTMENT (OUTPATIENT)
Dept: OTOLARYNGOLOGY | Facility: CLINIC | Age: 78
End: 2023-05-02
Payer: MEDICARE

## 2023-05-02 VITALS
HEIGHT: 73 IN | TEMPERATURE: 98 F | HEART RATE: 83 BPM | BODY MASS INDEX: 22.93 KG/M2 | DIASTOLIC BLOOD PRESSURE: 74 MMHG | WEIGHT: 173 LBS | OXYGEN SATURATION: 95 % | SYSTOLIC BLOOD PRESSURE: 123 MMHG

## 2023-05-02 DIAGNOSIS — H65.21 CHRONIC SEROUS OTITIS MEDIA, RIGHT EAR: ICD-10-CM

## 2023-05-02 PROCEDURE — 99213 OFFICE O/P EST LOW 20 MIN: CPT | Mod: 24

## 2023-05-02 NOTE — PHYSICAL EXAM
[de-identified] : l nl, yaritza cortés healed  [Normal] : no nystagmus [de-identified] : gait steady

## 2023-05-02 NOTE — ASSESSMENT
[FreeTextEntry1] : r ETD, r don\par -s/p augmentin, Flonase\par -s/p r myringotomy\par -r tm healed- advised he can get ear wet\par -continue Flonase\par discussed options- he wishes to give it more time - I agree this is reasonable\par RTC in 6 weeks; asked to call sooner for any issues

## 2023-05-02 NOTE — HISTORY OF PRESENT ILLNESS
[de-identified] : 1 week followup visit for this 77 y/o M with r otalgia. He finished a course of augmentin and uses Flonase. At last visit he was found to have r don and r ETD. He had myringotomy and has been using ofloxacin ear drops. He feels 99% better. He reports that he cannot pop his right ear. He has h/o TMJ and has had dizziness in the past, but reports no further episodes.

## 2023-06-13 ENCOUNTER — APPOINTMENT (OUTPATIENT)
Dept: OTOLARYNGOLOGY | Facility: CLINIC | Age: 78
End: 2023-06-13
Payer: MEDICARE

## 2023-06-13 VITALS
TEMPERATURE: 98 F | OXYGEN SATURATION: 96 % | SYSTOLIC BLOOD PRESSURE: 110 MMHG | BODY MASS INDEX: 22.93 KG/M2 | WEIGHT: 173 LBS | DIASTOLIC BLOOD PRESSURE: 72 MMHG | HEART RATE: 84 BPM | HEIGHT: 73 IN

## 2023-06-13 DIAGNOSIS — H90.3 SENSORINEURAL HEARING LOSS, BILATERAL: ICD-10-CM

## 2023-06-13 DIAGNOSIS — H69.80 OTHER SPECIFIED DISORDERS OF EUSTACHIAN TUBE, UNSPECIFIED EAR: ICD-10-CM

## 2023-06-13 PROCEDURE — 99213 OFFICE O/P EST LOW 20 MIN: CPT

## 2023-06-13 NOTE — HISTORY OF PRESENT ILLNESS
[de-identified] : 6 week followup visit for this 79 y/o M with h/o r don and r ETD. He has had myringotomy in the past. He feels his right ear is much better. He uses Flonase. He wears b hearing aids.

## 2023-06-13 NOTE — ASSESSMENT
[FreeTextEntry1] : 1. r etd\par -pt is comfortable, prefers no further intervention at this point\par -explained he could consider PE tube in the future if he becomes symptomatic again\par 2. b snhl\par - from 23 revealed b snhl -results rereviewed with pt \par -continue with hearing aids\par RTC in 1 yr with rpt ; call sooner for any issues

## 2023-07-19 NOTE — PROGRESS NOTE ADULT - PROVIDER SPECIALTY LIST ADULT
Nephrology Doxycycline Pregnancy And Lactation Text: This medication is Pregnancy Category D and not consider safe during pregnancy. It is also excreted in breast milk but is considered safe for shorter treatment courses.

## 2023-11-28 NOTE — DISCHARGE NOTE NURSING/CASE MANAGEMENT/SOCIAL WORK - NSDCDMETYPESERV_GEN_ALL_CORE_FT
Imaging Reviewed Today:  CTA 11/21 - Skull base and supraclinoid right ICA is very small in caliber, particularly at the level of the proximal cavernous segment. The supraclinoid right ICA is somewhat small in caliber.Small anterior communicating artery visualized. Large bilateral A1 segments. No vascular aneurysm or AVM.    CTA neck 11/21 - Right internal carotid artery origin and proximal segment is essentially including, which appears chronic in nature. More distal right ICA is small in caliber and demonstrates multiple short segment stenoses. Calcified plaque at the origin of the left internal carotid artery results in approximately 30-35% short segment stenosis. Normal flow-related enhancement of the more distal vessel.    CT Head 11/21 - Parenchymal volume loss is identified. Abnormal low attenuation seen involving the high right frontal cortical subcortical region. This likely compatible with an area of old infarct.Noacute hemorrhage mass or mass effect is seenEvaluation of the osseous structures appropriate window appears unremarkableThe visualized paranasal sinuses mastoid and middle ear regions appear clear. IMPRESSION: Abnormal areas of low attenuation involving the high right   frontal cortical and subcortical region as described above.    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
portable oxygen tank, Home oxygen concentrator

## 2024-04-18 ENCOUNTER — APPOINTMENT (OUTPATIENT)
Dept: OTOLARYNGOLOGY | Facility: CLINIC | Age: 79
End: 2024-04-18
Payer: MEDICARE

## 2024-04-18 VITALS
HEART RATE: 94 BPM | OXYGEN SATURATION: 98 % | HEIGHT: 73 IN | DIASTOLIC BLOOD PRESSURE: 64 MMHG | TEMPERATURE: 97.3 F | WEIGHT: 170 LBS | BODY MASS INDEX: 22.53 KG/M2 | SYSTOLIC BLOOD PRESSURE: 101 MMHG

## 2024-04-18 DIAGNOSIS — H61.23 IMPACTED CERUMEN, BILATERAL: ICD-10-CM

## 2024-04-18 PROCEDURE — 99212 OFFICE O/P EST SF 10 MIN: CPT

## 2024-04-18 NOTE — PHYSICAL EXAM
[Normal] : external ears are normal bilaterally [de-identified] : b copious cerumen removed atraumatically with suction r>l, r partially removed under microscope, b TMs nl  [de-identified] : gait steady

## 2024-04-18 NOTE — ASSESSMENT
[FreeTextEntry1] : b cerumen impaction  -l cerumen completely removed -r cerumen partially removed- pt could not tolerate removal -debrox drops, instructed how to use  RTC in 1 week; call sooner for any issues

## 2024-04-18 NOTE — PROCEDURE
[Cerumen Impaction] : Cerumen Impaction [Same] : same as the Pre Op Dx. [] : Removal of Cerumen [FreeTextEntry5] : b copious cerumen removed atraumatically with suction r>l, r partially removed under microscope, b TMs nl

## 2024-04-18 NOTE — HISTORY OF PRESENT ILLNESS
[de-identified] : 10 month follow up visit for this 78 y/o M with h/o r don and r ETD. He has history of myringotomy in the past. He wears b hearing aids and during adjustment was found to have cerumen impaction. He is here to recheck his ears.

## 2024-04-26 ENCOUNTER — APPOINTMENT (OUTPATIENT)
Dept: OTOLARYNGOLOGY | Facility: CLINIC | Age: 79
End: 2024-04-26
Payer: MEDICARE

## 2024-04-26 VITALS
BODY MASS INDEX: 22.53 KG/M2 | TEMPERATURE: 98.3 F | WEIGHT: 170 LBS | RESPIRATION RATE: 18 BRPM | SYSTOLIC BLOOD PRESSURE: 114 MMHG | HEIGHT: 73 IN | HEART RATE: 93 BPM | OXYGEN SATURATION: 97 % | DIASTOLIC BLOOD PRESSURE: 72 MMHG

## 2024-04-26 DIAGNOSIS — H61.21 IMPACTED CERUMEN, RIGHT EAR: ICD-10-CM

## 2024-04-26 PROCEDURE — 99212 OFFICE O/P EST SF 10 MIN: CPT

## 2024-04-27 PROBLEM — H61.21 IMPACTED CERUMEN OF RIGHT EAR: Status: ACTIVE | Noted: 2024-04-27

## 2024-04-27 NOTE — REASON FOR VISIT
[Subsequent Evaluation] : a subsequent evaluation for [FreeTextEntry2] : follow up cerumen impaction

## 2024-04-27 NOTE — PHYSICAL EXAM
[Normal] : external ears are normal bilaterally [de-identified] : l eac and tm nl; r moist cerumen removed atraumatically with suction - eac and tm nl

## 2024-04-27 NOTE — HISTORY OF PRESENT ILLNESS
[de-identified] : 78 yo m with h/o r cerumen impaction has had it partially removed - he has been using drops to soften it and is here to complete the removal. Denies pain or drainage.

## 2025-01-13 NOTE — PROGRESS NOTE ADULT - PROBLEM SELECTOR PLAN 4
English Noted Cr at 1.91, unknown baseline but noted improvement to 1.64 with 3L IVF. Possible COVID component to kidney injury as well.   -f/u repeat urine lytes   -Hold home losartan in setting of LIZ  - avoid nephrotoxic agents

## 2025-01-27 ENCOUNTER — NON-APPOINTMENT (OUTPATIENT)
Age: 80
End: 2025-01-27

## 2025-01-28 ENCOUNTER — NON-APPOINTMENT (OUTPATIENT)
Age: 80
End: 2025-01-28

## 2025-01-28 ENCOUNTER — APPOINTMENT (OUTPATIENT)
Dept: OTOLARYNGOLOGY | Facility: CLINIC | Age: 80
End: 2025-01-28
Payer: MEDICARE

## 2025-01-28 VITALS
WEIGHT: 165 LBS | HEIGHT: 73 IN | TEMPERATURE: 98 F | SYSTOLIC BLOOD PRESSURE: 100 MMHG | DIASTOLIC BLOOD PRESSURE: 57 MMHG | BODY MASS INDEX: 21.87 KG/M2 | OXYGEN SATURATION: 97 % | HEART RATE: 95 BPM

## 2025-01-28 DIAGNOSIS — H61.23 IMPACTED CERUMEN, BILATERAL: ICD-10-CM

## 2025-01-28 PROCEDURE — 99212 OFFICE O/P EST SF 10 MIN: CPT

## 2025-06-20 ENCOUNTER — APPOINTMENT (OUTPATIENT)
Dept: OTOLARYNGOLOGY | Facility: CLINIC | Age: 80
End: 2025-06-20
Payer: MEDICARE

## 2025-06-20 VITALS
WEIGHT: 165 LBS | TEMPERATURE: 97.3 F | OXYGEN SATURATION: 94 % | BODY MASS INDEX: 21.87 KG/M2 | SYSTOLIC BLOOD PRESSURE: 123 MMHG | HEIGHT: 73 IN | HEART RATE: 105 BPM | DIASTOLIC BLOOD PRESSURE: 72 MMHG

## 2025-06-20 PROCEDURE — 99212 OFFICE O/P EST SF 10 MIN: CPT
